# Patient Record
Sex: MALE | Race: WHITE | NOT HISPANIC OR LATINO | URBAN - METROPOLITAN AREA
[De-identification: names, ages, dates, MRNs, and addresses within clinical notes are randomized per-mention and may not be internally consistent; named-entity substitution may affect disease eponyms.]

---

## 2017-05-10 ENCOUNTER — OUTPATIENT (OUTPATIENT)
Dept: OUTPATIENT SERVICES | Facility: HOSPITAL | Age: 82
LOS: 1 days | Discharge: HOME | End: 2017-05-10

## 2017-06-28 DIAGNOSIS — R35.0 FREQUENCY OF MICTURITION: ICD-10-CM

## 2017-06-28 DIAGNOSIS — R97.20 ELEVATED PROSTATE SPECIFIC ANTIGEN [PSA]: ICD-10-CM

## 2017-09-12 ENCOUNTER — OUTPATIENT (OUTPATIENT)
Dept: OUTPATIENT SERVICES | Facility: HOSPITAL | Age: 82
LOS: 1 days | Discharge: HOME | End: 2017-09-12

## 2017-09-12 DIAGNOSIS — R05 COUGH: ICD-10-CM

## 2018-03-20 NOTE — ASU PATIENT PROFILE, ADULT - ABILITY TO HEAR (WITH HEARING AID OR HEARING APPLIANCE IF NORMALLY USED):
wears left ear hearing aide/Mildly to Moderately Impaired: difficulty hearing in some environments or speaker may need to increase volume or speak distinctly

## 2018-03-21 ENCOUNTER — OUTPATIENT (OUTPATIENT)
Dept: OUTPATIENT SERVICES | Facility: HOSPITAL | Age: 83
LOS: 1 days | Discharge: HOME | End: 2018-03-21

## 2018-03-21 VITALS
TEMPERATURE: 97 F | HEIGHT: 63 IN | HEART RATE: 80 BPM | RESPIRATION RATE: 18 BRPM | WEIGHT: 132.06 LBS | OXYGEN SATURATION: 98 % | SYSTOLIC BLOOD PRESSURE: 174 MMHG | DIASTOLIC BLOOD PRESSURE: 98 MMHG

## 2018-03-21 VITALS — DIASTOLIC BLOOD PRESSURE: 88 MMHG | HEART RATE: 92 BPM | SYSTOLIC BLOOD PRESSURE: 181 MMHG | RESPIRATION RATE: 15 BRPM

## 2018-03-21 DIAGNOSIS — H25.89 OTHER AGE-RELATED CATARACT: ICD-10-CM

## 2018-06-06 ENCOUNTER — OUTPATIENT (OUTPATIENT)
Dept: OUTPATIENT SERVICES | Facility: HOSPITAL | Age: 83
LOS: 1 days | Discharge: HOME | End: 2018-06-06

## 2018-06-06 VITALS
WEIGHT: 132.06 LBS | RESPIRATION RATE: 15 BRPM | DIASTOLIC BLOOD PRESSURE: 100 MMHG | SYSTOLIC BLOOD PRESSURE: 212 MMHG | TEMPERATURE: 96 F | HEART RATE: 92 BPM | OXYGEN SATURATION: 97 % | HEIGHT: 64 IN

## 2018-06-06 VITALS
DIASTOLIC BLOOD PRESSURE: 100 MMHG | HEIGHT: 64 IN | SYSTOLIC BLOOD PRESSURE: 212 MMHG | TEMPERATURE: 96 F | OXYGEN SATURATION: 97 % | RESPIRATION RATE: 15 BRPM | WEIGHT: 132.06 LBS | HEART RATE: 92 BPM

## 2018-06-06 DIAGNOSIS — Z96.1 PRESENCE OF INTRAOCULAR LENS: Chronic | ICD-10-CM

## 2018-06-06 RX ORDER — SODIUM CHLORIDE 9 MG/ML
1000 INJECTION, SOLUTION INTRAVENOUS
Qty: 0 | Refills: 0 | Status: DISCONTINUED | OUTPATIENT
Start: 2018-06-06 | End: 2018-06-21

## 2018-06-06 RX ORDER — ONDANSETRON 8 MG/1
4 TABLET, FILM COATED ORAL ONCE
Qty: 0 | Refills: 0 | Status: DISCONTINUED | OUTPATIENT
Start: 2018-06-06 | End: 2018-06-21

## 2018-06-13 DIAGNOSIS — J44.9 CHRONIC OBSTRUCTIVE PULMONARY DISEASE, UNSPECIFIED: ICD-10-CM

## 2018-06-13 DIAGNOSIS — F17.210 NICOTINE DEPENDENCE, CIGARETTES, UNCOMPLICATED: ICD-10-CM

## 2018-06-13 DIAGNOSIS — H26.9 UNSPECIFIED CATARACT: ICD-10-CM

## 2019-03-31 ENCOUNTER — INPATIENT (INPATIENT)
Facility: HOSPITAL | Age: 84
LOS: 3 days | Discharge: HOME | End: 2019-04-04
Attending: INTERNAL MEDICINE | Admitting: INTERNAL MEDICINE
Payer: MEDICARE

## 2019-03-31 VITALS
OXYGEN SATURATION: 95 % | DIASTOLIC BLOOD PRESSURE: 127 MMHG | RESPIRATION RATE: 16 BRPM | SYSTOLIC BLOOD PRESSURE: 258 MMHG | HEART RATE: 98 BPM

## 2019-03-31 DIAGNOSIS — Z96.1 PRESENCE OF INTRAOCULAR LENS: Chronic | ICD-10-CM

## 2019-03-31 LAB
ALBUMIN SERPL ELPH-MCNC: 4.6 G/DL — SIGNIFICANT CHANGE UP (ref 3.5–5.2)
ALP SERPL-CCNC: 102 U/L — SIGNIFICANT CHANGE UP (ref 30–115)
ALT FLD-CCNC: 11 U/L — SIGNIFICANT CHANGE UP (ref 0–41)
ANION GAP SERPL CALC-SCNC: 12 MMOL/L — SIGNIFICANT CHANGE UP (ref 7–14)
APTT BLD: 25.6 SEC — LOW (ref 27–39.2)
AST SERPL-CCNC: 12 U/L — SIGNIFICANT CHANGE UP (ref 0–41)
BASOPHILS # BLD AUTO: 0.03 K/UL — SIGNIFICANT CHANGE UP (ref 0–0.2)
BASOPHILS NFR BLD AUTO: 0.5 % — SIGNIFICANT CHANGE UP (ref 0–1)
BILIRUB SERPL-MCNC: 0.2 MG/DL — SIGNIFICANT CHANGE UP (ref 0.2–1.2)
BUN SERPL-MCNC: 35 MG/DL — HIGH (ref 10–20)
CALCIUM SERPL-MCNC: 8.8 MG/DL — SIGNIFICANT CHANGE UP (ref 8.5–10.1)
CHLORIDE SERPL-SCNC: 107 MMOL/L — SIGNIFICANT CHANGE UP (ref 98–110)
CO2 SERPL-SCNC: 25 MMOL/L — SIGNIFICANT CHANGE UP (ref 17–32)
CREAT SERPL-MCNC: 2.7 MG/DL — HIGH (ref 0.7–1.5)
EOSINOPHIL # BLD AUTO: 0.07 K/UL — SIGNIFICANT CHANGE UP (ref 0–0.7)
EOSINOPHIL NFR BLD AUTO: 1.1 % — SIGNIFICANT CHANGE UP (ref 0–8)
GLUCOSE SERPL-MCNC: 114 MG/DL — HIGH (ref 70–99)
HCT VFR BLD CALC: 43.1 % — SIGNIFICANT CHANGE UP (ref 42–52)
HGB BLD-MCNC: 13.7 G/DL — LOW (ref 14–18)
IMM GRANULOCYTES NFR BLD AUTO: 0.3 % — SIGNIFICANT CHANGE UP (ref 0.1–0.3)
INR BLD: 1.02 RATIO — SIGNIFICANT CHANGE UP (ref 0.65–1.3)
LYMPHOCYTES # BLD AUTO: 1.21 K/UL — SIGNIFICANT CHANGE UP (ref 1.2–3.4)
LYMPHOCYTES # BLD AUTO: 19.7 % — LOW (ref 20.5–51.1)
MCHC RBC-ENTMCNC: 31.1 PG — HIGH (ref 27–31)
MCHC RBC-ENTMCNC: 31.8 G/DL — LOW (ref 32–37)
MCV RBC AUTO: 97.7 FL — HIGH (ref 80–94)
MONOCYTES # BLD AUTO: 0.68 K/UL — HIGH (ref 0.1–0.6)
MONOCYTES NFR BLD AUTO: 11.1 % — HIGH (ref 1.7–9.3)
NEUTROPHILS # BLD AUTO: 4.13 K/UL — SIGNIFICANT CHANGE UP (ref 1.4–6.5)
NEUTROPHILS NFR BLD AUTO: 67.3 % — SIGNIFICANT CHANGE UP (ref 42.2–75.2)
NRBC # BLD: 0 /100 WBCS — SIGNIFICANT CHANGE UP (ref 0–0)
PLATELET # BLD AUTO: 190 K/UL — SIGNIFICANT CHANGE UP (ref 130–400)
POTASSIUM SERPL-MCNC: 4.3 MMOL/L — SIGNIFICANT CHANGE UP (ref 3.5–5)
POTASSIUM SERPL-SCNC: 4.3 MMOL/L — SIGNIFICANT CHANGE UP (ref 3.5–5)
PROT SERPL-MCNC: 7.6 G/DL — SIGNIFICANT CHANGE UP (ref 6–8)
PROTHROM AB SERPL-ACNC: 11.7 SEC — SIGNIFICANT CHANGE UP (ref 9.95–12.87)
RBC # BLD: 4.41 M/UL — LOW (ref 4.7–6.1)
RBC # FLD: 13.3 % — SIGNIFICANT CHANGE UP (ref 11.5–14.5)
SODIUM SERPL-SCNC: 144 MMOL/L — SIGNIFICANT CHANGE UP (ref 135–146)
TROPONIN T SERPL-MCNC: <0.01 NG/ML — SIGNIFICANT CHANGE UP
WBC # BLD: 6.14 K/UL — SIGNIFICANT CHANGE UP (ref 4.8–10.8)
WBC # FLD AUTO: 6.14 K/UL — SIGNIFICANT CHANGE UP (ref 4.8–10.8)

## 2019-03-31 RX ORDER — LABETALOL HCL 100 MG
20 TABLET ORAL ONCE
Qty: 0 | Refills: 0 | Status: COMPLETED | OUTPATIENT
Start: 2019-03-31 | End: 2019-03-31

## 2019-03-31 RX ORDER — LABETALOL HCL 100 MG
10 TABLET ORAL ONCE
Qty: 0 | Refills: 0 | Status: COMPLETED | OUTPATIENT
Start: 2019-03-31 | End: 2019-03-31

## 2019-03-31 RX ADMIN — Medication 20 MILLIGRAM(S): at 22:49

## 2019-03-31 RX ADMIN — Medication 10 MILLIGRAM(S): at 23:50

## 2019-03-31 NOTE — ED PROVIDER NOTE - OBJECTIVE STATEMENT
84 y/o M with PMH COPD not on home O2, current smoker, HLD, presents for constant persistent RUE numbness/weakness x "3-4 or maybe 4-5"hrs which began while sitting down watching TV. unable to specify further time period. no blood thinner use. denies hx prior. no other neurological symptoms. no speech deficit. completed a 20 hr drive earlier today. no HA/vision changes/CP/SOB/N/V/diaphoresis/trauma.      PMD Demaso

## 2019-03-31 NOTE — CHART NOTE - NSCHARTNOTEFT_GEN_A_CORE
Was called for stroke code around 9:56pm.  Patient recently drove up from [place] and was home alone.  He says that 3-4 hours ago felt as if his right hand was asleep.  If ED has numbness and weakness of his right hand with pronator drift.  I spoke with the patient on the phone to try to clarify the time.  He says he was trying to lay down watching tv to take a nap.  He then remembers that his right hand was numb.  He says that he thought he was sleeping on his hand and tried to shake it awake.  I tried to clarify what he meant by he thought he slept on his hand for example did he fall asleep and then wake up but patient was not giving a clear answer and says he was trying to fall asleep but doesn't think he fell asleep.  He cannot give a specific time and is not sure about the 3-4 hours but knows it was still light out.  his friends corrina also questioned by house staff and were unable to provide any additional information about timing.  His BP is >250 SBP and likely has hypertensive emergency.    Plan  1. Admit to ICU/Stroke  2. Neurochecks Q1 hour and vital q1 hour until SBP ~200's  3. If symptoms worsen then repeat CTH STAT and obtain CTA H+N  4. As timing cannot be clarified to see if he qualifies for the extended TPA window cannot offer him TPA  5. If no hemorrhage on CTH then can give 325mg aspirin x1 then 81mg QD as well as atorvastatin 80m QD  6. Will need MRI brain w/o NILAM tomorrow if no contraindications  7. ECHO  8. Call back STAT for any changes in exam

## 2019-03-31 NOTE — ED PROVIDER NOTE - NS ED ROS FT
Review of Systems    Constitutional: (-) fever   Eyes/ENT: (-) vision changes  Cardiovascular: (-) chest pain, (-) syncope (-) palpitations  Respiratory: (-) cough, (-) shortness of breath  Gastrointestinal: (-) vomiting, (-) diarrhea s (-) abdominal pain  Genitourinary:  (-) dysuria   Musculoskeletal: (-) neck pain, (-) back pain, (-) leg pain/swelling  Integumentary: (-) rash, (-) edema  Neurological: (-) headache, (-) altered mental status (-) confusion  Hematologic: (-) easy bruising   Psychiatric: (-) hallucinations  Allergic/Immunologic: (-) pruritus

## 2019-03-31 NOTE — ED PROVIDER NOTE - PHYSICAL EXAMINATION
PHYSICAL EXAM:    GENERAL: Alert, appears stated age, well appearing, non-toxic  SKIN: Warm, pink and dry.  EYE: Normal lids/conjunctiva, PERRL, EOMI  ENT: Normal hearing, patent oropharynx   NECK: +supple. No meningismus, or JVD  Pulm: Bilateral BS, normal resp effort, no wheezes, stridor, or retractions  CV: RRR, no M/R/G, 2+and = radial pulses  Abd: soft, non-tender, non-distended  Mskel: no erythema, cyanosis, edema. no calf tenderness  Neuro: AAOx3, no sensory deficits, CN 2-12 intact. No speech slurring, facial asymmetry. +R pronator drift. 3/5 strength in RUE, otherwise 5/5 strength throughout. normal gait.

## 2019-03-31 NOTE — ED PROVIDER NOTE - CLINICAL SUMMARY MEDICAL DECISION MAKING FREE TEXT BOX
RUE weakness, STROKE code called.  Timing cannot be confirmed with patient. Case d/w Dr Medel of Neuro who spoke with patient on telephone.  not tpa candidate.  Labetalol needed for BP cpntrol, case d/w Intensivist, will admit to CCU

## 2019-03-31 NOTE — ED PROVIDER NOTE - CARE PLAN
Principal Discharge DX:	Upper extremity weakness  Secondary Diagnosis:	Hypertensive emergency  Secondary Diagnosis:	DMITRIY (acute kidney injury)

## 2019-03-31 NOTE — ED PROVIDER NOTE - PROGRESS NOTE DETAILS
cristóbal recommedns ICU for neuro checks and vital checks disucssed with Jamia phillips-- will come eval pt approved for ICU tele. pts PMD is demaso -- discssed with Dr. Craig

## 2019-04-01 PROBLEM — H91.90 UNSPECIFIED HEARING LOSS, UNSPECIFIED EAR: Chronic | Status: ACTIVE | Noted: 2018-03-21

## 2019-04-01 PROBLEM — J44.9 CHRONIC OBSTRUCTIVE PULMONARY DISEASE, UNSPECIFIED: Chronic | Status: ACTIVE | Noted: 2018-03-21

## 2019-04-01 PROBLEM — Z86.79 PERSONAL HISTORY OF OTHER DISEASES OF THE CIRCULATORY SYSTEM: Chronic | Status: ACTIVE | Noted: 2018-03-21

## 2019-04-01 LAB
ALBUMIN SERPL ELPH-MCNC: 4.2 G/DL — SIGNIFICANT CHANGE UP (ref 3.5–5.2)
ALP SERPL-CCNC: 93 U/L — SIGNIFICANT CHANGE UP (ref 30–115)
ALT FLD-CCNC: 9 U/L — SIGNIFICANT CHANGE UP (ref 0–41)
ANION GAP SERPL CALC-SCNC: 13 MMOL/L — SIGNIFICANT CHANGE UP (ref 7–14)
APPEARANCE UR: CLEAR — SIGNIFICANT CHANGE UP
AST SERPL-CCNC: 12 U/L — SIGNIFICANT CHANGE UP (ref 0–41)
BACTERIA # UR AUTO: ABNORMAL
BILIRUB DIRECT SERPL-MCNC: <0.2 MG/DL — SIGNIFICANT CHANGE UP (ref 0–0.2)
BILIRUB INDIRECT FLD-MCNC: >0 MG/DL — LOW (ref 0.2–1.2)
BILIRUB SERPL-MCNC: 0.2 MG/DL — SIGNIFICANT CHANGE UP (ref 0.2–1.2)
BILIRUB UR-MCNC: NEGATIVE — SIGNIFICANT CHANGE UP
BUN SERPL-MCNC: 34 MG/DL — HIGH (ref 10–20)
CALCIUM SERPL-MCNC: 8.4 MG/DL — LOW (ref 8.5–10.1)
CHLORIDE SERPL-SCNC: 109 MMOL/L — SIGNIFICANT CHANGE UP (ref 98–110)
CHOLEST SERPL-MCNC: 160 MG/DL — SIGNIFICANT CHANGE UP (ref 100–200)
CK MB CFR SERPL CALC: 7.6 NG/ML — HIGH (ref 0.6–6.3)
CO2 SERPL-SCNC: 21 MMOL/L — SIGNIFICANT CHANGE UP (ref 17–32)
COLOR SPEC: YELLOW — SIGNIFICANT CHANGE UP
CREAT ?TM UR-MCNC: 61 MG/DL — SIGNIFICANT CHANGE UP
CREAT ?TM UR-MCNC: 62 MG/DL — SIGNIFICANT CHANGE UP
CREAT SERPL-MCNC: 2.5 MG/DL — HIGH (ref 0.7–1.5)
DIFF PNL FLD: ABNORMAL
EPI CELLS # UR: ABNORMAL /HPF
ESTIMATED AVERAGE GLUCOSE: 108 MG/DL — SIGNIFICANT CHANGE UP (ref 68–114)
GLUCOSE SERPL-MCNC: 103 MG/DL — HIGH (ref 70–99)
GLUCOSE UR QL: NEGATIVE MG/DL — SIGNIFICANT CHANGE UP
HBA1C BLD-MCNC: 5.4 % — SIGNIFICANT CHANGE UP (ref 4–5.6)
HDLC SERPL-MCNC: 62 MG/DL — SIGNIFICANT CHANGE UP
KETONES UR-MCNC: NEGATIVE — SIGNIFICANT CHANGE UP
LEUKOCYTE ESTERASE UR-ACNC: NEGATIVE — SIGNIFICANT CHANGE UP
LIPID PNL WITH DIRECT LDL SERPL: 91 MG/DL — SIGNIFICANT CHANGE UP (ref 4–129)
NITRITE UR-MCNC: NEGATIVE — SIGNIFICANT CHANGE UP
OSMOLALITY UR: 464 MOS/KG — SIGNIFICANT CHANGE UP (ref 50–1400)
PH UR: 5.5 — SIGNIFICANT CHANGE UP (ref 5–8)
POTASSIUM SERPL-MCNC: 4.4 MMOL/L — SIGNIFICANT CHANGE UP (ref 3.5–5)
POTASSIUM SERPL-SCNC: 4.4 MMOL/L — SIGNIFICANT CHANGE UP (ref 3.5–5)
PROT ?TM UR-MCNC: 22 MG/DLG/24H — SIGNIFICANT CHANGE UP
PROT SERPL-MCNC: 6.8 G/DL — SIGNIFICANT CHANGE UP (ref 6–8)
PROT UR-MCNC: 30 MG/DL
PROT/CREAT UR-RTO: 0.4 RATIO — HIGH (ref 0–0.2)
RBC CASTS # UR COMP ASSIST: SIGNIFICANT CHANGE UP /HPF
SODIUM SERPL-SCNC: 143 MMOL/L — SIGNIFICANT CHANGE UP (ref 135–146)
SODIUM UR-SCNC: 134 MMOL/L — SIGNIFICANT CHANGE UP
SP GR SPEC: 1.02 — SIGNIFICANT CHANGE UP (ref 1.01–1.03)
TOTAL CHOLESTEROL/HDL RATIO MEASUREMENT: 2.6 RATIO — LOW (ref 4–5.5)
TRIGL SERPL-MCNC: 115 MG/DL — SIGNIFICANT CHANGE UP (ref 10–149)
TROPONIN T SERPL-MCNC: <0.01 NG/ML — SIGNIFICANT CHANGE UP
UROBILINOGEN FLD QL: 0.2 MG/DL — SIGNIFICANT CHANGE UP (ref 0.2–0.2)

## 2019-04-01 PROCEDURE — 93970 EXTREMITY STUDY: CPT | Mod: 26

## 2019-04-01 PROCEDURE — 93979 VASCULAR STUDY: CPT | Mod: 26

## 2019-04-01 PROCEDURE — 93880 EXTRACRANIAL BILAT STUDY: CPT | Mod: 26

## 2019-04-01 PROCEDURE — 71250 CT THORAX DX C-: CPT | Mod: 26

## 2019-04-01 PROCEDURE — 76775 US EXAM ABDO BACK WALL LIM: CPT | Mod: 26

## 2019-04-01 RX ORDER — LABETALOL HCL 100 MG
20 TABLET ORAL ONCE
Qty: 0 | Refills: 0 | Status: DISCONTINUED | OUTPATIENT
Start: 2019-04-01 | End: 2019-04-01

## 2019-04-01 RX ORDER — ATORVASTATIN CALCIUM 80 MG/1
80 TABLET, FILM COATED ORAL AT BEDTIME
Qty: 0 | Refills: 0 | Status: DISCONTINUED | OUTPATIENT
Start: 2019-04-01 | End: 2019-04-04

## 2019-04-01 RX ORDER — DOXAZOSIN MESYLATE 4 MG
2 TABLET ORAL AT BEDTIME
Qty: 0 | Refills: 0 | Status: DISCONTINUED | OUTPATIENT
Start: 2019-04-01 | End: 2019-04-04

## 2019-04-01 RX ORDER — NICOTINE POLACRILEX 2 MG
1 GUM BUCCAL DAILY
Qty: 0 | Refills: 0 | Status: DISCONTINUED | OUTPATIENT
Start: 2019-04-01 | End: 2019-04-04

## 2019-04-01 RX ORDER — LABETALOL HCL 100 MG
10 TABLET ORAL ONCE
Qty: 0 | Refills: 0 | Status: COMPLETED | OUTPATIENT
Start: 2019-04-01 | End: 2019-04-01

## 2019-04-01 RX ORDER — ATORVASTATIN CALCIUM 80 MG/1
40 TABLET, FILM COATED ORAL AT BEDTIME
Qty: 0 | Refills: 0 | Status: DISCONTINUED | OUTPATIENT
Start: 2019-04-01 | End: 2019-04-01

## 2019-04-01 RX ORDER — CLOPIDOGREL BISULFATE 75 MG/1
75 TABLET, FILM COATED ORAL DAILY
Qty: 0 | Refills: 0 | Status: DISCONTINUED | OUTPATIENT
Start: 2019-04-01 | End: 2019-04-01

## 2019-04-01 RX ORDER — NIFEDIPINE 30 MG
30 TABLET, EXTENDED RELEASE 24 HR ORAL DAILY
Qty: 0 | Refills: 0 | Status: DISCONTINUED | OUTPATIENT
Start: 2019-04-01 | End: 2019-04-04

## 2019-04-01 RX ORDER — HEPARIN SODIUM 5000 [USP'U]/ML
5000 INJECTION INTRAVENOUS; SUBCUTANEOUS EVERY 8 HOURS
Qty: 0 | Refills: 0 | Status: DISCONTINUED | OUTPATIENT
Start: 2019-04-01 | End: 2019-04-04

## 2019-04-01 RX ORDER — IPRATROPIUM/ALBUTEROL SULFATE 18-103MCG
3 AEROSOL WITH ADAPTER (GRAM) INHALATION EVERY 6 HOURS
Qty: 0 | Refills: 0 | Status: DISCONTINUED | OUTPATIENT
Start: 2019-04-01 | End: 2019-04-04

## 2019-04-01 RX ORDER — ASPIRIN/CALCIUM CARB/MAGNESIUM 324 MG
325 TABLET ORAL DAILY
Qty: 0 | Refills: 0 | Status: DISCONTINUED | OUTPATIENT
Start: 2019-04-01 | End: 2019-04-04

## 2019-04-01 RX ORDER — NICARDIPINE HYDROCHLORIDE 30 MG/1
2.5 CAPSULE, EXTENDED RELEASE ORAL
Qty: 40 | Refills: 0 | Status: DISCONTINUED | OUTPATIENT
Start: 2019-04-01 | End: 2019-04-01

## 2019-04-01 RX ORDER — PANTOPRAZOLE SODIUM 20 MG/1
40 TABLET, DELAYED RELEASE ORAL
Qty: 0 | Refills: 0 | Status: DISCONTINUED | OUTPATIENT
Start: 2019-04-01 | End: 2019-04-04

## 2019-04-01 RX ADMIN — Medication 40 MILLIGRAM(S): at 09:09

## 2019-04-01 RX ADMIN — Medication 325 MILLIGRAM(S): at 12:48

## 2019-04-01 RX ADMIN — Medication 10 MILLIGRAM(S): at 00:30

## 2019-04-01 RX ADMIN — HEPARIN SODIUM 5000 UNIT(S): 5000 INJECTION INTRAVENOUS; SUBCUTANEOUS at 21:40

## 2019-04-01 RX ADMIN — Medication 2 MILLIGRAM(S): at 21:39

## 2019-04-01 RX ADMIN — Medication 1 PATCH: at 20:07

## 2019-04-01 RX ADMIN — ATORVASTATIN CALCIUM 80 MILLIGRAM(S): 80 TABLET, FILM COATED ORAL at 21:39

## 2019-04-01 RX ADMIN — Medication 30 MILLIGRAM(S): at 09:32

## 2019-04-01 RX ADMIN — Medication 1 PATCH: at 11:16

## 2019-04-01 RX ADMIN — NICARDIPINE HYDROCHLORIDE 12.5 MG/HR: 30 CAPSULE, EXTENDED RELEASE ORAL at 08:52

## 2019-04-01 RX ADMIN — HEPARIN SODIUM 5000 UNIT(S): 5000 INJECTION INTRAVENOUS; SUBCUTANEOUS at 05:38

## 2019-04-01 RX ADMIN — Medication 3 MILLILITER(S): at 21:48

## 2019-04-01 RX ADMIN — Medication 600 MILLIGRAM(S): at 18:19

## 2019-04-01 RX ADMIN — NICARDIPINE HYDROCHLORIDE 12.5 MG/HR: 30 CAPSULE, EXTENDED RELEASE ORAL at 04:10

## 2019-04-01 RX ADMIN — HEPARIN SODIUM 5000 UNIT(S): 5000 INJECTION INTRAVENOUS; SUBCUTANEOUS at 15:14

## 2019-04-01 RX ADMIN — PANTOPRAZOLE SODIUM 40 MILLIGRAM(S): 20 TABLET, DELAYED RELEASE ORAL at 06:02

## 2019-04-01 NOTE — SWALLOW BEDSIDE ASSESSMENT ADULT - ASR SWALLOW ASPIRATION MONITOR
throat clearing/upper respiratory infection/position upright (90Y)/cough/fever/oral hygiene/pneumonia/change of breathing pattern/gurgly voice

## 2019-04-01 NOTE — SWALLOW BEDSIDE ASSESSMENT ADULT - COMMENTS
Spoke with RN (Yani). Pt has a baseline cough. Noted coughing after drinking liquids today. Tolerated meals and medications without noted difficulty.

## 2019-04-01 NOTE — SWALLOW BEDSIDE ASSESSMENT ADULT - ORAL PHASE
Within functional limits Slow but functional bolus manipulation/propulsion. likely contributed to by limited dentition./Delayed oral transit time

## 2019-04-01 NOTE — H&P ADULT - HISTORY OF PRESENT ILLNESS
84 y/o M with PMH COPD not on home O2, current smoker, HLD, presents for constant persistent RUE numbness/weakness x "3-4 or maybe 4-5"hrs which began while sitting down watching TV. unable to specify further time period. No blood thinner use. Denies any hx of strokes in the past. He completed a 20 hour drive from [place] the day ptp. NIHSS 1. Pt was also hypertensive on arrival to 258/127 HR 98. Pt was started on cardene drip, which he remains on this am. CT head neg. Pt was also found to have Cr 2.7, it was 2.3 in 4/2017, today it is downtrending to 2.5.

## 2019-04-01 NOTE — H&P ADULT - NSHPLABSRESULTS_GEN_ALL_CORE
CARDIAC MARKERS ( 01 Apr 2019 05:24 )  x     / <0.01 ng/mL / x     / x     / 7.6 ng/mL  CARDIAC MARKERS ( 31 Mar 2019 22:20 )  x     / <0.01 ng/mL / x     / x     / x                                  13.7   6.14  )-----------( 190      ( 31 Mar 2019 22:20 )             43.1       04-01    143  |  109  |  34<H>  ----------------------------<  103<H>  4.4   |  21  |  2.5<H>    Ca    8.4<L>      01 Apr 2019 05:24    TPro  6.8  /  Alb  4.2  /  TBili  0.2  /  DBili  <0.2  /  AST  12  /  ALT  9   /  AlkPhos  93  04-01      CAPILLARY BLOOD GLUCOSE      POCT Blood Glucose.: 113 mg/dL (31 Mar 2019 21:55)      LIVER FUNCTIONS - ( 01 Apr 2019 05:24 )  Alb: 4.2 g/dL / Pro: 6.8 g/dL / ALK PHOS: 93 U/L / ALT: 9 U/L / AST: 12 U/L / GGT: x             PT/INR - ( 31 Mar 2019 22:20 )   PT: 11.70 sec;   INR: 1.02 ratio         PTT - ( 31 Mar 2019 22:20 )  PTT:25.6 sec    CT Head: neg                  I&O's Summary    31 Mar 2019 07:01  -  01 Apr 2019 07:00  --------------------------------------------------------  IN: 25 mL / OUT: 660 mL / NET: -635 mL    01 Apr 2019 07:01  -  01 Apr 2019 08:20  --------------------------------------------------------  IN: 0 mL / OUT: 200 mL / NET: -200 mL

## 2019-04-01 NOTE — OCCUPATIONAL THERAPY INITIAL EVALUATION ADULT - GENERAL OBSERVATIONS, REHAB EVAL
12:55-13:20 12:55-13:20 Chart reviewed. Patient ok to be seen as confirmed by RN patient received in bedside chair +tele in NAD agreeable for treatment

## 2019-04-01 NOTE — CONSULT NOTE ADULT - SUBJECTIVE AND OBJECTIVE BOX
HPI:  84 y/o M with PMH COPD not on home O2, current smoker, HLD, presents for constant persistent RUE numbness/weakness x "3-4 or maybe 4-5"hrs which began while sitting down watching TV. unable to specify further time period. No blood thinner use. Denies any hx of strokes in the past. He completed a 20 hour drive from [place] the day ptp. NIHSS 1. Pt was also hypertensive on arrival to 258/127 HR 98. Pt was started on cardene drip, which he remains on this am. CT head neg. Pt was also found to have Cr 2.7, it was 2.3 in 4/2017, today it is downtrending to 2.5.     PTN  REFERRED TO ACUTE  REHAB  FOR  EVAL AND  TX   PAST MEDICAL & SURGICAL HISTORY:  [place] ([place] of hearing)  H/O: HTN (hypertension)  Advanced COPD  History of intraocular lens implant: LEFT EYE      Hospital Course:    TODAY'S SUBJECTIVE & REVIEW OF SYMPTOMS:     Constitutional WNL   Cardio WNL   Resp WNL   GI WNL  Heme WNL  Endo WNL  Skin WNL  MSK WNL  Neuro WNL  Cognitive WNL  Psych WNL      MEDICATIONS  (STANDING):  atorvastatin 40 milliGRAM(s) Oral at bedtime  clopidogrel Tablet 75 milliGRAM(s) Oral daily  heparin  Injectable 5000 Unit(s) SubCutaneous every 8 hours  niCARdipine Infusion 2.5 mG/Hr (12.5 mL/Hr) IV Continuous <Continuous>  nicotine - 21 mG/24Hr(s) Patch 1 patch Transdermal daily  NIFEdipine XL 30 milliGRAM(s) Oral daily  pantoprazole    Tablet 40 milliGRAM(s) Oral before breakfast  predniSONE   Tablet 40 milliGRAM(s) Oral daily    MEDICATIONS  (PRN):  ALBUTerol/ipratropium for Nebulization 3 milliLiter(s) Nebulizer every 6 hours PRN Shortness of Breath and/or Wheezing      FAMILY HISTORY:      Allergies    No Known Allergies    Intolerances        SOCIAL HISTORY:    [  ] Etoh  [  ] Smoking  [  ] Substance abuse     Home Environment:  [ x ] Home Alone  [  ] Lives with Family  [  ] Home Health Aid    Dwelling:  [  ] Apartment  [ x ] Private House  [  ] Adult Home  [  ] Skilled Nursing Facility      [  ] Short Term  [  ] Long Term  [ x ] Stairs       Elevator [  ]    FUNCTIONAL STATUS PTA: (Check all that apply)  Ambulation: [   x]Independent    [  ] Dependent     [  ] Non-Ambulatory  Assistive Device: [ x] SA Cane  [  ]  Q Cane  [  ] Walker  [  ]  Wheelchair  ADL : [ x ] Independent  [  ]  Dependent       Vital Signs Last 24 Hrs  T(C): 35.9 (01 Apr 2019 07:10), Max: 35.9 (01 Apr 2019 07:10)  T(F): 96.6 (01 Apr 2019 07:10), Max: 96.6 (01 Apr 2019 07:10)  HR: 76 (01 Apr 2019 09:01) (68 - 98)  BP: 180/95 (01 Apr 2019 09:01) (140/72 - 258/127)  BP(mean): 130 (01 Apr 2019 09:01) (97 - 165)  RR: 20 (01 Apr 2019 09:00) (16 - 24)  SpO2: 95% (01 Apr 2019 09:01) (89% - 96%)      PHYSICAL EXAM: Alert & Oriented X3  GENERAL: NAD, well-groomed, well-developed  HEAD:  Atraumatic, Normocephalic  EYES: EOMI, PERRLA, conjunctiva and sclera clear  NECK: Supple, No JVD, Normal thyroid  CHEST/LUNG: Clear to percussion bilaterally; No rales, rhonchi, wheezing, or rubs  HEART: Regular rate and rhythm; No murmurs, rubs, or gallops  ABDOMEN: Soft, Nontender, Nondistended; Bowel sounds present  EXTREMITIES:  2+ Peripheral Pulses, No clubbing, cyanosis, or edema    NERVOUS SYSTEM:  Cranial Nerves 2-12 intact [ x ] Abnormal  [  ]  ROM: WFL all extremities [ x ]  Abnormal [  ]  Motor Strength: WFL all extremities  [ x ]  Abnormal [  ]  Sensation: intact to light touch [ x ] Abnormal [  ]  Reflexes: Symmetric [ x ]  Abnormal [  ]    FUNCTIONAL STATUS:  Bed Mobility: Independent [  ]  Supervision [x  ]  Needs Assistance [  ]  N/A [  ]  Transfers: Independent [  ]  Supervision [ x ]  Needs Assistance [  ]  N/A [  ]   Ambulation: Independent [  ]  Supervision [ x ]  Needs Assistance [  ]  N/A [  ]  ADL: Independent [  ] Requires Assistance [  ] N/A [ x ]  SEE PT/OT IE NOTES    LABS:                        13.7   6.14  )-----------( 190      ( 31 Mar 2019 22:20 )             43.1     04-01    143  |  109  |  34<H>  ----------------------------<  103<H>  4.4   |  21  |  2.5<H>    Ca    8.4<L>      01 Apr 2019 05:24    TPro  6.8  /  Alb  4.2  /  TBili  0.2  /  DBili  <0.2  /  AST  12  /  ALT  9   /  AlkPhos  93  04-01    PT/INR - ( 31 Mar 2019 22:20 )   PT: 11.70 sec;   INR: 1.02 ratio         PTT - ( 31 Mar 2019 22:20 )  PTT:25.6 sec      RADIOLOGY & ADDITIONAL STUDIES:  < from: CT Brain Stroke Protocol (03.31.19 @ 22:07) >  FINDINGS:    The ventricles and cortical sulci are appropriate for the patient's   stated age.    Gray-white matter differentiation is preserved.    There are scattered patchy hypodensities throughout the hemispheric white   matter which are nonspecific and without mass effect, compatible with   chronic microvascular ischemic changes.    There is no evidence for intracranial hemorrhage, significant   space-occupying process, or recent territorial infarction.    The calvarium is intact. Visualized paranasal sinuses and mastoids are   well-aerated.      IMPRESSION:    No CT evidence for acute intracranial pathology.         < end of copied text >    Assesment:

## 2019-04-01 NOTE — SWALLOW BEDSIDE ASSESSMENT ADULT - PHARYNGEAL PHASE
Multiple swallows/+ toleration observed without overt symptoms of penetration/aspiration. + toleration observed without overt symptoms of penetration/aspiration

## 2019-04-01 NOTE — CONSULT NOTE ADULT - SUBJECTIVE AND OBJECTIVE BOX
NEPHROLOGY CONSULTATION NOTE    82 y/o M with PMH COPD not on home O2, current smoker, HLD, presents for constant persistent RUE numbness/weakness x  4-hrs which began while sitting down watching TV. unable to specify further time period. No blood thinner use. Denies any hx of strokes in the past. He completed a 20 hour drive from [place] the day ptp. Pt was also hypertensive on arrival to 258/127 HR 98. Pt was started on cardene drip, which he remains on this am. CT head neg. Pt was also found to have Cr 2.7, it was 2.3 in 4/2017, today it is downtrending to 2.5.   Also noted to have microalbuminuaria in 2017. Does not follow up with a doctor, just started seeing Dr Carrillo. Not on BP meds at home, taking only a statin.  /o frequent urination at night, no hematuria or dysuria.    PAST MEDICAL & SURGICAL HISTORY:  [place] (hard of hearing)  H/O: HTN (hypertension)  Advanced COPD  History of intraocular lens implant: LEFT EYE    Allergies:  No Known Allergies    Home Medications Reviewed  Hospital Medications:   MEDICATIONS  (STANDING):  atorvastatin 40 milliGRAM(s) Oral at bedtime  clopidogrel Tablet 75 milliGRAM(s) Oral daily  heparin  Injectable 5000 Unit(s) SubCutaneous every 8 hours  niCARdipine Infusion 2.5 mG/Hr (12.5 mL/Hr) IV Continuous <Continuous>  nicotine - 21 mG/24Hr(s) Patch 1 patch Transdermal daily  NIFEdipine XL 30 milliGRAM(s) Oral daily  pantoprazole    Tablet 40 milliGRAM(s) Oral before breakfast  predniSONE   Tablet 40 milliGRAM(s) Oral daily      SOCIAL HISTORY:  Denies ETOH,Smoking,   FAMILY HISTORY:        REVIEW OF SYSTEMS:  CONSTITUTIONAL: No weakness, fevers or chills  EYES/ENT: No visual changes;  No vertigo or throat pain   NECK: No pain or stiffness  RESPIRATORY: No cough, wheezing, hemoptysis; No shortness of breath  CARDIOVASCULAR: No chest pain or palpitations.  GASTROINTESTINAL: No abdominal or epigastric pain. No nausea, vomiting, or hematemesis;   GENITOURINARY: No dysuria, foamy urine, or hematuria  SKIN: No itching, burning, rashes, or lesions   VASCULAR: No bilateral lower extremity edema.   All other review of systems is negative unless indicated above.    VITALS:  T(F): 96.6 (04-01-19 @ 07:10), Max: 96.6 (04-01-19 @ 07:10)  HR: 70 (04-01-19 @ 06:46)  BP: 145/79 (04-01-19 @ 06:46)  RR: 20 (04-01-19 @ 07:10)  SpO2: 92% (04-01-19 @ 06:46)    03-31 @ 07:01  -  04-01 @ 07:00  --------------------------------------------------------  IN: 25 mL / OUT: 660 mL / NET: -635 mL    04-01 @ 07:01  -  04-01 @ 09:01  --------------------------------------------------------  IN: 0 mL / OUT: 200 mL / NET: -200 mL      Height (cm): 162.56 (04-01 @ 00:26)      I&O's Detail    31 Mar 2019 07:01  -  01 Apr 2019 07:00  --------------------------------------------------------  IN:    niCARdipine Infusion: 25 mL  Total IN: 25 mL    OUT:    Voided: 660 mL  Total OUT: 660 mL    Total NET: -635 mL      01 Apr 2019 07:01  -  01 Apr 2019 09:01  --------------------------------------------------------  IN:  Total IN: 0 mL    OUT:    Voided: 200 mL  Total OUT: 200 mL    Total NET: -200 mL            PHYSICAL EXAM:  Constitutional: NAD  HEENT: anicteric sclera, MMM  Neck: No JVD  Respiratory: CTAB,   Cardiovascular: S1, S2, RRR  Gastrointestinal: BS+, soft, NT/ND  Extremities: o peripheral edema  Neurological: A/O x 3,  Psychiatric: Normal mood, normal affect  : No CVA tenderness. No hernández.   Skin: No rashes  Vascular Access:    LABS:  04-01    143  |  109  |  34<H>  ----------------------------<  103<H>  4.4   |  21  |  2.5<H>    Ca    8.4<L>      01 Apr 2019 05:24    TPro  6.8  /  Alb  4.2  /  TBili  0.2  /  DBili  <0.2  /  AST  12  /  ALT  9   /  AlkPhos  93  04-01    Creatinine Trend: 2.5 <--, 2.7 <--                        13.7   6.14  )-----------( 190      ( 31 Mar 2019 22:20 )             43.1     Urine Studies:              RADIOLOGY & ADDITIONAL STUDIES:    < from: CT Brain Stroke Protocol (03.31.19 @ 22:07) >  IMPRESSION:    No CT evidence for acute intracranial pathology.     < end of copied text >

## 2019-04-01 NOTE — PHYSICAL THERAPY INITIAL EVALUATION ADULT - GENERAL OBSERVATIONS, REHAB EVAL
11:05-11:25 Chart reviewed. Pt encountered sitting in chair, may be seen by Physical Therapist as confirmed with Nurse. Patient denied pain at rest and would like to walk but "(R) hand feels numb"; +tele

## 2019-04-01 NOTE — H&P ADULT - NSHPPHYSICALEXAM_GEN_ALL_CORE
General: elderly male, reclining in bed  Cardiac: RRR, S1S3  Lungs: CTAB  Abd: NTND, +BS  LE: no swelling  Neuro: AAOx3, nonfocal, still endorses subjective coming and going weakness of the RUE, but there is no objective weakness appreciated, sensation intact  no other focal deficits appreciated  +hearing deficit General: elderly male, reclining in bed  Cardiac: RRR, S1S3  Lungs: ++hacking cough  Abd: NTND, +BS  LE: no swelling  Neuro: AAOx3, nonfocal, still endorses subjective coming and going weakness of the RUE, but there is no objective weakness appreciated, sensation intact  no other focal deficits appreciated  +hearing deficit General: elderly male, reclining in bed  Cardiac: RRR, S1S3  Lungs: ++hacking cough  Abd: NTND, +BS  LE: no swelling  Neuro: AAOx3, nonfocal, still endorses subjective coming and going weakness of the RUE, but there is no objective weakness appreciated, however pt does have dysmetria when he peforms tasks with that hand, sensation intact  no other focal deficits appreciated  +hearing deficit

## 2019-04-01 NOTE — SWALLOW BEDSIDE ASSESSMENT ADULT - SWALLOW EVAL: DIAGNOSIS
No evidence of oral or pharyngeal dysphagia during clinical bedside swallow evaluation. RN and pt report baseline cough, however not observed at the time of the evaluation or during/after PO intake. Possible GI involvement due to pt complaints of premature feeling of fullness and noted burping during intake.

## 2019-04-01 NOTE — SWALLOW BEDSIDE ASSESSMENT ADULT - ASR SWALLOW DENTITION
partial top and bottom. pt declined to put them in prior to PO intake. States he prefers not to wear them./partial dentures

## 2019-04-01 NOTE — H&P ADULT - ASSESSMENT
RUE Weakness likely 2/2 Musculoskeletal Issue considering recent 20 hour drive  -CT head neg, no weakness appreciated on physical exam  -will repeat scan if new neurologic signs develop  -on asa, plavix, statin    HTN  -on cardene gtt  -will start po meds and try to wean off drip    COPD, active smoker  -educated to stop smoking  -will start nicotine patch if needed    DMITRIY on CKD vs Progression of CKD, could be 2/2 long standing HTN  -trend Cr, followup urine studies  -control bp better  -made 600cc of urine since admission    DVT PPX: hep sq RUE Weakness likely 2/2 Musculoskeletal Issue considering recent 20 hour drive  -CT head neg, no weakness appreciated on physical exam  -will repeat scan if new neurologic signs develop  -on plavix, statin    HTN  -on cardene gtt  -will start po meds and try to wean off drip, start nifedepine er 30mg qd    COPD, active smoker  -educated to stop smoking  -will start nicotine patch if needed    DMITRIY on CKD vs Progression of CKD, could be 2/2 long standing HTN  -trend Cr, followup urine studies  -control bp better  -made 600cc of urine since admission    DVT PPX: hep sq RUE Weakness likely 2/2 Stroke  -CT head neg  -will repeat scan if new neurologic signs develop  -on asa 325 now (pt denies taking any asa from home), statin  -as per Dr Vega-will obtain MRI Brain noncon    HTN  -on cardene gtt  -will start po meds and try to wean off drip, start nifedepine er 30mg qd    COPD, active smoker  -educated to stop smoking  -will start nicotine patch if needed    DMITRIY on CKD vs Progression of CKD, could be 2/2 long standing HTN  -trend Cr, followup urine studies  -control bp better  -made 600cc of urine since admission    DVT PPX: hep sq RUE Weakness likely 2/2 Stroke  -CT head neg  -will repeat scan if new neurologic signs develop  -on asa 325 now (pt denies taking any asa from home), statin  -as per Dr Vega-will obtain MRI Brain noncon    HTN  -on cardene gtt  -will start po meds and try to wean off drip, start nifedepine er 30mg qd    COPD, active smoker  -educated to stop smoking  -will start nicotine patch if needed  -cxr shows R upper extremity opacity/scarring, radiology recommends nonurgent ct chest    DMITRIY on CKD vs Progression of CKD, could be 2/2 long standing HTN  -trend Cr, followup urine studies  -control bp better  -made 600cc of urine since admission    DVT PPX: hep sq RUE Weakness likely 2/2 Stroke  -CT head neg  -will repeat scan if new neurologic signs develop  -on asa 325 now (pt denies taking any asa from home), statin  -as per Dr Vega-will obtain MRI Brain noncon    HTN  -on cardene gtt  -will start po meds and try to wean off drip, start nifedepine er 30mg qd    COPD, active smoker  -educated to stop smoking  -will start nicotine patch if needed  -cxr shows R upper extremity opacity/scarring, radiology recommends nonurgent ct chest    DMITRIY on CKD vs Progression of CKD, could be 2/2 long standing HTN  -trend Cr, followup urine studies  -control bp better  -made 600cc of urine since admission    DVT PPX: hep sq    approved to be downgraded to hard tele as per fellow Dr Kirby

## 2019-04-01 NOTE — PROGRESS NOTE ADULT - SUBJECTIVE AND OBJECTIVE BOX
Patient is a 83y old  Male who presents with a chief complaint of R hand weakness (01 Apr 2019 08:10)        Over Night Events:        ROS:  See HPI    PHYSICAL EXAM    ICU Vital Signs Last 24 Hrs  T(C): 35.9 (01 Apr 2019 07:10), Max: 35.9 (01 Apr 2019 07:10)  T(F): 96.6 (01 Apr 2019 07:10), Max: 96.6 (01 Apr 2019 07:10)  HR: 70 (01 Apr 2019 06:46) (68 - 98)  BP: 145/79 (01 Apr 2019 06:46) (140/72 - 258/127)  BP(mean): 107 (01 Apr 2019 06:46) (97 - 165)  ABP: --  ABP(mean): --  RR: 20 (01 Apr 2019 07:10) (16 - 24)  SpO2: 92% (01 Apr 2019 06:46) (89% - 96%)      General: Coughing.   HEENT: LEATHA             Lymphatic system: No cervical LN   Lungs: Bilateral BS  Cardiovascular: Regular   Gastrointestinal: Soft, Positive BS  Extremities: No clubbing.  Moves extremities.  Full Range of motion   Skin: Warm, intact  Neurological: No motor or sensory deficit       03-31-19 @ 07:01  -  04-01-19 @ 07:00  --------------------------------------------------------  IN:    niCARdipine Infusion: 25 mL  Total IN: 25 mL    OUT:    Voided: 660 mL  Total OUT: 660 mL    Total NET: -635 mL      04-01-19 @ 07:01  -  04-01-19 @ 08:53  --------------------------------------------------------  IN:  Total IN: 0 mL    OUT:    Voided: 200 mL  Total OUT: 200 mL    Total NET: -200 mL          LABS:                            13.7   6.14  )-----------( 190      ( 31 Mar 2019 22:20 )             43.1                                               04-01    143  |  109  |  34<H>  ----------------------------<  103<H>  4.4   |  21  |  2.5<H>    Ca    8.4<L>      01 Apr 2019 05:24    TPro  6.8  /  Alb  4.2  /  TBili  0.2  /  DBili  <0.2  /  AST  12  /  ALT  9   /  AlkPhos  93  04-01      PT/INR - ( 31 Mar 2019 22:20 )   PT: 11.70 sec;   INR: 1.02 ratio         PTT - ( 31 Mar 2019 22:20 )  PTT:25.6 sec                                           CARDIAC MARKERS ( 01 Apr 2019 05:24 )  x     / <0.01 ng/mL / x     / x     / 7.6 ng/mL  CARDIAC MARKERS ( 31 Mar 2019 22:20 )  x     / <0.01 ng/mL / x     / x     / x                                                LIVER FUNCTIONS - ( 01 Apr 2019 05:24 )  Alb: 4.2 g/dL / Pro: 6.8 g/dL / ALK PHOS: 93 U/L / ALT: 9 U/L / AST: 12 U/L / GGT: x                                                                                                                                       MEDICATIONS  (STANDING):  atorvastatin 40 milliGRAM(s) Oral at bedtime  clopidogrel Tablet 75 milliGRAM(s) Oral daily  heparin  Injectable 5000 Unit(s) SubCutaneous every 8 hours  niCARdipine Infusion 2.5 mG/Hr (12.5 mL/Hr) IV Continuous <Continuous>  pantoprazole    Tablet 40 milliGRAM(s) Oral before breakfast    MEDICATIONS  (PRN):      Xrays:                                                                                     ECHO

## 2019-04-01 NOTE — PROGRESS NOTE ADULT - ASSESSMENT
Impression:  hyperintensive urgency  CKD? Secondary to uncontrolled HTN  Active smoker    IMPRESSION:      CNS: Follow up neurology recommendation. Continue with aspirn and lipitor.     HEENT: Oral care.    PULMONARY:  HOB @ 45 degrees. Prednisone 40 mg for 5 dyas. Out patient pulm follow up for PFT. Nicotine patch.     CARDIOVASCULAR: Blood pressure control. Start on oral hypertensive therapy.     GI: GI prophylaxis.  Feeding as tolerated    RENAL:  Follow up lytes.  Correct as needed. Send UA    INFECTIOUS DISEASE: Follow up cultures. No indication for antibiotics for now.     HEMATOLOGICAL:  DVT prophylaxis.    ENDOCRINE:  Follow up FS.  Insulin protocol if needed.    MUSCULOSKELETAL:  Physical therapy Impression:  hyperintensive urgency v. CVA  CKD? Secondary to uncontrolled HTN  Active smoker    IMPRESSION:      CNS: Follow up neurology recommendation. Continue with aspirin and Lipitor     HEENT: Oral care.    PULMONARY:  HOB @ 45 degrees. Prednisone 40 mg for 5 dyas. Out patient pulm follow up for PFT. Nicotine patch.     CARDIOVASCULAR: Blood pressure control. Start on oral hypertensive therapy. Keep at current level    GI: GI prophylaxis.  Feeding as tolerated    RENAL:  Follow up lytes.  Correct as needed. Send UA    INFECTIOUS DISEASE: Follow up cultures. No indication for antibiotics for now.     HEMATOLOGICAL:  DVT prophylaxis.    ENDOCRINE:  Follow up FS.  Insulin protocol if needed.    MUSCULOSKELETAL:  Physical therapy Impression:  hyperintensive urgency v. CVA  CKD? Secondary to uncontrolled HTN  Active smoker    IMPRESSION:      CNS: Follow up neurology recommendation. Continue with aspirin and Lipitor     HEENT: Oral care.    PULMONARY:  HOB @ 45 degrees. Prednisone 40 mg for 5 dyas. Out patient pulm follow up for PFT. Nicotine patch.   R Apical fullness check CT chest NC    CARDIOVASCULAR: Blood pressure control. Start on oral hypertensive therapy. Keep at current level    GI: GI prophylaxis.  Feeding as tolerated    RENAL:  Follow up lytes.  Correct as needed. Send UA    INFECTIOUS DISEASE: Follow up cultures. No indication for antibiotics for now.     HEMATOLOGICAL:  DVT prophylaxis.    ENDOCRINE:  Follow up FS.  Insulin protocol if needed.    MUSCULOSKELETAL:  Physical therapy

## 2019-04-01 NOTE — CONSULT NOTE ADULT - ASSESSMENT
IMPRESSION: Rehab of 84 y/o m rehab  for  rt  side  weakness  debility      PRECAUTIONS: [  ] Cardiac  [  ] Respiratory  [  ] Seizures [  ] Contact Isolation  [  ] Droplet Isolation  [ FALL ] Other    Weight Bearing Status:     RECOMMENDATION:    Out of Bed to Chair     DVT/Decubiti Prophylaxis    REHAB PLAN:     [ xx  ] Bedside P/T 3-5 times a week   [   ]   Bedside O/T  2-3 times a week             [   ] No Rehab Therapy Indicated                   [   ]  Speech Therapy   Conditioning/ROM                                    ADL  Bed Mobility                                               Conditioning/ROM  Transfers                                                     Bed Mobility  Sitting /Standing Balance                         Transfers                                        Gait Training                                               Sitting/Standing Balance  Stair Training [   ]Applicable                    Home equipment Eval                                                                        Splinting  [   ] Only      GOALS:   ADL   [   ]   Independent                    Transfers  [   ] Independent                          Ambulation  [   ] Independent     [    ] With device                            [   ]  CG                                                         [   ]  CG                                                                  [   ] CG                            [    ] Min A                                                   [   ] Min A                                                              [   ] Min  A          DISCHARGE PLAN:   [   ]  Good candidate for Intensive Rehabilitation/Hospital based-[facility]A [facility]                                             Will tolerate 3hrs Intensive Rehab Daily                                       [    ]  Short Term Rehab in Skilled Nursing Facility                                       [ xx   ]  Home with Outpatient or VN services                                         [    ]  Possible Candidate for Intensive Hospital based Rehab

## 2019-04-01 NOTE — PHYSICAL THERAPY INITIAL EVALUATION ADULT - GAIT DEVIATIONS NOTED, PT EVAL
stooped posture, dec heel strike/pushoff/decreased weight-shifting ability/decreased step length/decreased darryn

## 2019-04-01 NOTE — SWALLOW BEDSIDE ASSESSMENT ADULT - SLP GENERAL OBSERVATIONS
Pt seen awake upright in bed. Brother present at bedside. Pt is hard of hearing, has amplifier device in place. AOx3. Pleasant and cooperative.

## 2019-04-01 NOTE — SWALLOW BEDSIDE ASSESSMENT ADULT - SLP PERTINENT HISTORY OF CURRENT PROBLEM
Admitted 3/31/19 with RUE numbness/weakness. No TPA administered as pt was outside window. CT of the head was negative. Pending MRI of the brain

## 2019-04-01 NOTE — PHYSICAL THERAPY INITIAL EVALUATION ADULT - IMPAIRMENTS CONTRIBUTING TO GAIT DEVIATIONS, PT EVAL
narrow base of support/decreased endurance ; increased coughing/impaired postural control/impaired balance/decreased strength

## 2019-04-01 NOTE — CONSULT NOTE ADULT - ASSESSMENT
83/M with COPD, HTN, HLD, CKD presents with Rt arm numbness and HTN urgency.    Pt found to have advanced CKD with DMITRIY.    HTN - on Cardene drip, switched to Procardia XL 30 mg qd  - would add Alfa1 blocker Doxazosin 2 mg at night if SBP >160 later during the day  - hold off diuretics for now b/o CKD,, but need CXR to assess volume  - screen for target organ damage - LVH, CM  - UA for protein/microalb/creat ratio,   - screen for secondary HTN - Aldosterone/Renin ratio  - plasma cathecholamine and metanephrines  24 hr urine for catecholamines and metanephrines  - Renal Artery Duppelx    DMITRIY on CKD - baseline creat 2.3 in 2017, which may have progressed by now b/o uncontrolled HTN  - kidney and bladder sono for PVR  - basic w/u for HTN  - SPEP/UPEP/SIF/UIF, Hep B and C profile, XENA C3c4  - check phos, iPTH    COPD - will be started on steroids    D/W ICU team.

## 2019-04-01 NOTE — PROGRESS NOTE ADULT - SUBJECTIVE AND OBJECTIVE BOX
SUBJ:No chest pain or shortness of breath      MEDICATIONS  (STANDING):  aspirin 325 milliGRAM(s) Oral daily  atorvastatin 80 milliGRAM(s) Oral at bedtime  doxazosin 2 milliGRAM(s) Oral at bedtime  guaiFENesin  milliGRAM(s) Oral every 12 hours  heparin  Injectable 5000 Unit(s) SubCutaneous every 8 hours  nicotine - 21 mG/24Hr(s) Patch 1 patch Transdermal daily  NIFEdipine XL 30 milliGRAM(s) Oral daily  pantoprazole    Tablet 40 milliGRAM(s) Oral before breakfast  predniSONE   Tablet 40 milliGRAM(s) Oral daily    MEDICATIONS  (PRN):  ALBUTerol/ipratropium for Nebulization 3 milliLiter(s) Nebulizer every 6 hours PRN Shortness of Breath and/or Wheezing            Vital Signs Last 24 Hrs  T(C): 35.3 (01 Apr 2019 15:01), Max: 35.9 (01 Apr 2019 07:10)  T(F): 95.5 (01 Apr 2019 15:01), Max: 96.6 (01 Apr 2019 07:10)  HR: 76 (01 Apr 2019 09:01) (68 - 98)  BP: 180/95 (01 Apr 2019 09:01) (140/72 - 258/127)  BP(mean): 130 (01 Apr 2019 09:01) (97 - 165)  RR: 18 (01 Apr 2019 19:00) (16 - 24)  SpO2: 95% (01 Apr 2019 09:01) (89% - 96%)      ECG:NML    TTE:    LABS:                        13.7   6.14  )-----------( 190      ( 31 Mar 2019 22:20 )             43.1     04-01    143  |  109  |  34<H>  ----------------------------<  103<H>  4.4   |  21  |  2.5<H>    Ca    8.4<L>      01 Apr 2019 05:24    TPro  6.8  /  Alb  4.2  /  TBili  0.2  /  DBili  <0.2  /  AST  12  /  ALT  9   /  AlkPhos  93  04-01    CARDIAC MARKERS ( 01 Apr 2019 05:24 )  x     / <0.01 ng/mL / x     / x     / 7.6 ng/mL  CARDIAC MARKERS ( 31 Mar 2019 22:20 )  x     / <0.01 ng/mL / x     / x     / x          PT/INR - ( 31 Mar 2019 22:20 )   PT: 11.70 sec;   INR: 1.02 ratio         PTT - ( 31 Mar 2019 22:20 )  PTT:25.6 sec    I&O's Summary    31 Mar 2019 07:01  -  01 Apr 2019 07:00  --------------------------------------------------------  IN: 25 mL / OUT: 660 mL / NET: -635 mL    01 Apr 2019 07:01  -  01 Apr 2019 19:29  --------------------------------------------------------  IN: 0 mL / OUT: 750 mL / NET: -750 mL      BNP

## 2019-04-01 NOTE — CONSULT NOTE ADULT - SUBJECTIVE AND OBJECTIVE BOX
Neurology Consult    Patient is a 83y old  Male who presents with a chief complaint of R hand weakness   Patient reports that about 2.30 pm yesterday he noticed that his right hand was not working well- could not use it/ lift it  he returned from [place] recently- also complains of cough  no fever/ chest pain  no headache/  no weakness in leg- he did not notice a facial droop      HPI:  84 y/o M with PMH COPD not on home O2, current smoker, HLD, presents for constant persistent RUE numbness/weakness x "3-4 or maybe 4-5"hrs which began while sitting down watching TV. unable to specify further time period. No blood thinner use. Denies any hx of strokes in the past. He completed a 20 hour drive from [place] the day ptp. NIHSS 1. Pt was also hypertensive on arrival to 258/127 HR 98. Pt was started on cardene drip, which he remains on this am. CT head neg. Pt was also found to have Cr 2.7, it was 2.3 in 4/2017, today it is downtrending to 2.5. (01 Apr 2019 08:10)      PAST MEDICAL & SURGICAL HISTORY:  [place]  H/O: HTN (hypertension)  Advanced COPD  History of intraocular lens implant: LEFT EYE    Allergies  No Known Allergies    MEDICATIONS  (STANDING):  aspirin 325 milliGRAM(s) Oral daily  atorvastatin 80 milliGRAM(s) Oral at bedtime  atorvastatin 40 milliGRAM(s) Oral at bedtime  doxazosin 2 milliGRAM(s) Oral at bedtime  heparin  Injectable 5000 Unit(s) SubCutaneous every 8 hours  nicotine - 21 mG/24Hr(s) Patch 1 patch Transdermal daily  NIFEdipine XL 30 milliGRAM(s) Oral daily  pantoprazole    Tablet 40 milliGRAM(s) Oral before breakfast  predniSONE   Tablet 40 milliGRAM(s) Oral daily    MEDICATIONS  (PRN):  ALBUTerol/ipratropium for Nebulization 3 milliLiter(s) Nebulizer every 6 hours PRN Shortness of Breath and/or Wheezing      Review of systems:    Constitutional: No fever, weight loss or fatigue    Eyes: No eye pain or discharge  ENMT:  No difficulty hearing; No sinus or throat pain  Neck: No pain or stiffness  Respiratory:cough++, no wheezing, chills or hemoptysis  Cardiovascular: No chest pain, palpitations, shortness of breath, dyspnea on exertion  Gastrointestinal: No abdominal pain, nausea, vomiting or hematemesis; No diarrhea or constipation.   Genitourinary: No dysuria, frequency, hematuria or incontinence  Neurological: As per HPI  Skin: No rashes or lesions   Endocrine: No heat or cold intolerance; No hair loss  Musculoskeletal: No joint pain or swelling  Psychiatric: No depression, anxiety, mood swings  Heme/Lymph: No easy bruising or bleeding gums    Vital Signs Last 24 Hrs  T(C): 35.8 (01 Apr 2019 11:00), Max: 35.9 (01 Apr 2019 07:10)  T(F): 96.5 (01 Apr 2019 11:00), Max: 96.6 (01 Apr 2019 07:10)  HR: 76 (01 Apr 2019 09:01) (68 - 98)  BP: 180/95 (01 Apr 2019 09:01) (140/72 - 258/127)  BP(mean): 130 (01 Apr 2019 09:01) (97 - 165)  RR: 20 (01 Apr 2019 11:00) (16 - 24)  SpO2: 95% (01 Apr 2019 09:01) (89% - 96%)    Neurologic Examination:  alert/ orienetd speech is fluent- but hoarse  comprehension intact pt very hard of hearing- need to speak into his machine  PERRL/EOMI  face is symmetric  tongue is midline- moves well  no facial weakness/ sensory deficit  Tone is normal strength in robert LE and lt UE 5/5  rt UE proximal- and at elbow 4/5  rapid alt movements slow in rt hand-  weak  reflexes 2+  LT/PP robert intact  finger to nose on rt-hits his nose- left intact  no tremor    NIH stroke scale 2    Labs:   CBC Full  -  ( 31 Mar 2019 22:20 )  WBC Count : 6.14 K/uL  RBC Count : 4.41 M/uL  Hemoglobin : 13.7 g/dL  Hematocrit : 43.1 %  Platelet Count - Automated : 190 K/uL  Mean Cell Volume : 97.7 fL  Mean Cell Hemoglobin : 31.1 pg  Mean Cell Hemoglobin Concentration : 31.8 g/dL  Auto Neutrophil # : 4.13 K/uL  Auto Lymphocyte # : 1.21 K/uL  Auto Monocyte # : 0.68 K/uL  Auto Eosinophil # : 0.07 K/uL  Auto Basophil # : 0.03 K/uL  Auto Neutrophil % : 67.3 %  Auto Lymphocyte % : 19.7 %  Auto Monocyte % : 11.1 %  Auto Eosinophil % : 1.1 %  Auto Basophil % : 0.5 %    04-01    143  |  109  |  34<H>  ----------------------------<  103<H>  4.4   |  21  |  2.5<H>    Ca    8.4<L>      01 Apr 2019 05:24    TPro  6.8  /  Alb  4.2  /  TBili  0.2  /  DBili  <0.2  /  AST  12  /  ALT  9   /  AlkPhos  93  04-01    LIVER FUNCTIONS - ( 01 Apr 2019 05:24 )  Alb: 4.2 g/dL / Pro: 6.8 g/dL / ALK PHOS: 93 U/L / ALT: 9 U/L / AST: 12 U/L / GGT: x           PT/INR - ( 31 Mar 2019 22:20 )   PT: 11.70 sec;   INR: 1.02 ratio         PTT - ( 31 Mar 2019 22:20 )  PTT:25.6 sec      Assessment:  This is a 83y Male with h/o acute rt arma nd hand weakness- per patient has improved significantly since yesterday-  likely acute ischemic event-  patient not a TPA candidate as he presented outside the window    carotid doppler shows no significant stenosis on either side-  MRI brain pending  continue aspirin- patient says he has not taken aspirin ever-  increase statin to 80 mg daily  echo report pending

## 2019-04-01 NOTE — CONSULT NOTE ADULT - SUBJECTIVE AND OBJECTIVE BOX
Patient is a 83y old  Male who presents with a chief complaint of acute rt sided weakness/paresthesia    HPI:  Pt is an elderly white male , PMH is noted below. Pt c/o : sudden rt arm weakness and paresthesia about 3 hrs PTA to hospital..  pt denied- head ache, fever, chills, nausea, vomiting.     PAST MEDICAL & SURGICAL HISTORY:  [place] (hard of hearing)  H/O: HTN (hypertension)  Advanced COPD  History of intraocular lens implant: LEFT EYE    Allergies    No Known Allergies    Intolerances      FAMILY HISTORY:    Home Medications:  pravastatin 40 mg oral tablet: 1 tab(s) orally once a day (01 Apr 2019 00:07)    Occupation:  Alochol: Denied  Smoking: Denied  Drug Use: Denied  Marital Status:         ROS: as in HPI; All other systems reviewed are negative    ICU Vital Signs Last 24 Hrs  T(C): 35.8 (01 Apr 2019 00:26), Max: 35.8 (01 Apr 2019 00:26)  T(F): 96.5 (01 Apr 2019 00:26), Max: 96.5 (01 Apr 2019 00:26)  HR: 77 (01 Apr 2019 00:39) (75 - 98)  BP: 187/92 (01 Apr 2019 00:39) (183/89 - 258/127)  BP(mean): 132 (01 Apr 2019 00:39) (132 - 144)  ABP: --  ABP(mean): --  RR: 24 (01 Apr 2019 00:39) (16 - 24)  SpO2: 96% (01 Apr 2019 00:39) (92% - 96%)        Physical Examination:    General: No acute distress.  Alert, oriented, interactive, nonfocal    HEENT: Pupils equal, reactive to light.  Symmetric.    PULM: Clear to auscultation bilaterally, no significant sputum production    CVS: Regular rate and rhythm, no murmurs, rubs, or gallops    ABD: Soft, nondistended, nontender, normoactive bowel sounds, no masses    EXT: No edema, nontender    SKIN: Warm and well perfused, no rashes noted.              I&O's Detail    31 Mar 2019 07:01  -  01 Apr 2019 00:46  --------------------------------------------------------  IN:  Total IN: 0 mL    OUT:    Voided: 50 mL  Total OUT: 50 mL    Total NET: -50 mL            LABS:                        13.7   6.14  )-----------( 190      ( 31 Mar 2019 22:20 )             43.1     31 Mar 2019 22:20    144    |  107    |  35     ----------------------------<  114    4.3     |  25     |  2.7      Ca    8.8        31 Mar 2019 22:20    TPro  7.6    /  Alb  4.6    /  TBili  0.2    /  DBili  x      /  AST  12     /  ALT  11     /  AlkPhos  102    31 Mar 2019 22:20  Amylase x     lipase x          CARDIAC MARKERS ( 31 Mar 2019 22:20 )  x     / <0.01 ng/mL / x     / x     / x          CAPILLARY BLOOD GLUCOSE      POCT Blood Glucose.: 113 mg/dL (31 Mar 2019 21:55)    PT/INR - ( 31 Mar 2019 22:20 )   PT: 11.70 sec;   INR: 1.02 ratio         PTT - ( 31 Mar 2019 22:20 )  PTT:25.6 sec    Culture        MEDICATIONS  (STANDING):  atorvastatin 40 milliGRAM(s) Oral at bedtime  clopidogrel Tablet 75 milliGRAM(s) Oral daily  heparin  Injectable 5000 Unit(s) SubCutaneous every 8 hours  labetalol Injectable 10 milliGRAM(s) IV Push once  pantoprazole    Tablet 40 milliGRAM(s) Oral before breakfast    MEDICATIONS  (PRN):        RADIOLOGY: ***     CXR:  TLC:  OG:  ET tube:          ECHO:      IMPRESSION:        PLAN:    CNS:    HEENT:    PULMONARY:    CARDIOVASCULAR:    GI: GI prophylaxis.  Feeding     RENAL:    INFECTIOUS DISEASE:    HEMATOLOGICAL:  DVT prophylaxis.    ENDOCRINE:  Follow up FS.  Insulin protocol if needed.    MUSCULOSKELETAL:        CRITICAL CARE TIME SPENT: *** Patient is a 83y old  Male who presents with a chief complaint of acute rt sided weakness/paresthesia    HPI:  Pt is an elderly white male , PMH is noted below. Pt c/o : sudden rt arm weakness and paresthesia about 3 hrs PTA to hospital..  pt denied- head ache, fever, chills, nausea, vomiting.  Stroke code was call in ER, CT head is neg for acute CVA. pt also noted to have elevated BP >200/100. w/ creat.- 2.7.      PAST MEDICAL & SURGICAL HISTORY:  [place] (hard of hearing)  H/O: HTN (hypertension)  Advanced COPD  History of intraocular lens implant: LEFT EYE    Allergies    No Known Allergies    Intolerances      FAMILY HISTORY:    Home Medications:  pravastatin 40 mg oral tablet: 1 tab(s) orally once a day (01 Apr 2019 00:07)    Occupation:  AlSiphonLabsol: Denied  Smoking: Denied  Drug Use: Denied  Marital Status:         ROS: as in HPI; All other systems reviewed are negative    ICU Vital Signs Last 24 Hrs  T(C): 35.8 (01 Apr 2019 00:26), Max: 35.8 (01 Apr 2019 00:26)  T(F): 96.5 (01 Apr 2019 00:26), Max: 96.5 (01 Apr 2019 00:26)  HR: 77 (01 Apr 2019 00:39) (75 - 98)  BP: 187/92 (01 Apr 2019 00:39) (183/89 - 258/127)  BP(mean): 132 (01 Apr 2019 00:39) (132 - 144)  ABP: --  ABP(mean): --  RR: 24 (01 Apr 2019 00:39) (16 - 24)  SpO2: 96% (01 Apr 2019 00:39) (92% - 96%)        Physical Examination:    General: elderly white male  No acute distress.  Alert, oriented, interactive, nonfocal    HEENT: Pupils equal, reactive to light.  Symmetric.    PULM: Clear to auscultation bilaterally, no significant sputum production    CVS: Regular rate and rhythm, no murmurs, rubs, or gallops    ABD: Soft, nondistended, nontender, normoactive bowel sounds, no masses    EXT: No edema, nontender    SKIN: Warm and well perfused, no rashes noted.  neuro- cr n 1-12 grossly intact  no -sensory or motor deficits              I&O's Detail    31 Mar 2019 07:01  -  01 Apr 2019 00:46  --------------------------------------------------------  IN:  Total IN: 0 mL    OUT:    Voided: 50 mL  Total OUT: 50 mL    Total NET: -50 mL            LABS:                        13.7   6.14  )-----------( 190      ( 31 Mar 2019 22:20 )             43.1     31 Mar 2019 22:20    144    |  107    |  35     ----------------------------<  114    4.3     |  25     |  2.7      Ca    8.8        31 Mar 2019 22:20    TPro  7.6    /  Alb  4.6    /  TBili  0.2    /  DBili  x      /  AST  12     /  ALT  11     /  AlkPhos  102    31 Mar 2019 22:20  Amylase x     lipase x          CARDIAC MARKERS ( 31 Mar 2019 22:20 )  x     / <0.01 ng/mL / x     / x     / x          CAPILLARY BLOOD GLUCOSE      POCT Blood Glucose.: 113 mg/dL (31 Mar 2019 21:55)    PT/INR - ( 31 Mar 2019 22:20 )   PT: 11.70 sec;   INR: 1.02 ratio         PTT - ( 31 Mar 2019 22:20 )  PTT:25.6 sec    Culture        MEDICATIONS  (STANDING):  atorvastatin 40 milliGRAM(s) Oral at bedtime  clopidogrel Tablet 75 milliGRAM(s) Oral daily  heparin  Injectable 5000 Unit(s) SubCutaneous every 8 hours  labetalol Injectable 10 milliGRAM(s) IV Push once  pantoprazole    Tablet 40 milliGRAM(s) Oral before breakfast    MEDICATIONS  (PRN):        RADIOLOGY: ***   < from: CT Brain Stroke Protocol (03.31.19 @ 22:07) >  IMPRESSION:    No CT evidence for acute intracranial pathology.               ECHO:      IMPRESSION:  1.  acute  rt arm/hand weakness, paresthesia r/o -CVA , TIA  2.  HTN urgency  3. renal failure       PLAN:    case discussed w/ Dr Joseph...  adm to telemetry...  neuro ck q 4, neuro consult   aspirin, plavix, statin,  PT/OT,   f/u- BP, cardiac enzymes, urine chemistries  renal consult                CRITICAL CARE TIME SPENT: *** Patient is a 83y old  Male who presents with a chief complaint of acute rt sided weakness/paresthesia    HPI:  Pt is an elderly white male , PMH is noted below. Pt c/o : sudden rt arm weakness and paresthesia about 3 hrs PTA to hospital..  pt denied- head ache, fever, chills, nausea, vomiting.  Stroke code was call in ER, CT head is neg for acute CVA. pt also noted to have elevated BP >200/100. w/ creat.- 2.7.      PAST MEDICAL & SURGICAL HISTORY:  [place] (hard of hearing)  H/O: HTN (hypertension)  Advanced COPD  History of intraocular lens implant: LEFT EYE    Allergies    No Known Allergies    Intolerances      FAMILY HISTORY:    Home Medications:  pravastatin 40 mg oral tablet: 1 tab(s) orally once a day (01 Apr 2019 00:07)    Occupation:  [employer]ol: Denied  Smoking: active smoker  Drug Use: Denied  Marital Status:         ROS: as in HPI; All other systems reviewed are negative    ICU Vital Signs Last 24 Hrs  T(C): 35.8 (01 Apr 2019 00:26), Max: 35.8 (01 Apr 2019 00:26)  T(F): 96.5 (01 Apr 2019 00:26), Max: 96.5 (01 Apr 2019 00:26)  HR: 77 (01 Apr 2019 00:39) (75 - 98)  BP: 187/92 (01 Apr 2019 00:39) (183/89 - 258/127)  BP(mean): 132 (01 Apr 2019 00:39) (132 - 144)  ABP: --  ABP(mean): --  RR: 24 (01 Apr 2019 00:39) (16 - 24)  SpO2: 96% (01 Apr 2019 00:39) (92% - 96%)        Physical Examination:    General: elderly white male  No acute distress.  Alert, oriented, interactive, nonfocal    HEENT: Pupils equal, reactive to light.  Symmetric.    PULM: Clear to auscultation bilaterally, no significant sputum production    CVS: Regular rate and rhythm, no murmurs, rubs, or gallops    ABD: Soft, nondistended, nontender, normoactive bowel sounds, no masses    EXT: No edema, nontender    SKIN: Warm and well perfused, no rashes noted.  neuro- cr n 1-12 grossly intact  no -sensory or motor deficits              I&O's Detail    31 Mar 2019 07:01  -  01 Apr 2019 00:46  --------------------------------------------------------  IN:  Total IN: 0 mL    OUT:    Voided: 50 mL  Total OUT: 50 mL    Total NET: -50 mL            LABS:                        13.7   6.14  )-----------( 190      ( 31 Mar 2019 22:20 )             43.1     31 Mar 2019 22:20    144    |  107    |  35     ----------------------------<  114    4.3     |  25     |  2.7      Ca    8.8        31 Mar 2019 22:20    TPro  7.6    /  Alb  4.6    /  TBili  0.2    /  DBili  x      /  AST  12     /  ALT  11     /  AlkPhos  102    31 Mar 2019 22:20  Amylase x     lipase x          CARDIAC MARKERS ( 31 Mar 2019 22:20 )  x     / <0.01 ng/mL / x     / x     / x          CAPILLARY BLOOD GLUCOSE      POCT Blood Glucose.: 113 mg/dL (31 Mar 2019 21:55)    PT/INR - ( 31 Mar 2019 22:20 )   PT: 11.70 sec;   INR: 1.02 ratio         PTT - ( 31 Mar 2019 22:20 )  PTT:25.6 sec    Culture        MEDICATIONS  (STANDING):  atorvastatin 40 milliGRAM(s) Oral at bedtime  clopidogrel Tablet 75 milliGRAM(s) Oral daily  heparin  Injectable 5000 Unit(s) SubCutaneous every 8 hours  labetalol Injectable 10 milliGRAM(s) IV Push once  pantoprazole    Tablet 40 milliGRAM(s) Oral before breakfast    MEDICATIONS  (PRN):        RADIOLOGY: ***   < from: CT Brain Stroke Protocol (03.31.19 @ 22:07) >  IMPRESSION:    No CT evidence for acute intracranial pathology.               ECHO:      IMPRESSION:  1.  acute  rt arm/hand weakness, paresthesia r/o -CVA , TIA  2.  HTN urgency  3. renal failure       PLAN:    case discussed w/ Dr Joseph...  adm to telemetry...  neuro ck q 4, neuro consult   aspirin, plavix, statin,  smoking cessation  PT/OT,   f/u- BP, cardiac enzymes, urine chemistries  renal consult                CRITICAL CARE TIME SPENT: ***

## 2019-04-02 LAB
% ALBUMIN: 55.6 % — SIGNIFICANT CHANGE UP
% ALPHA 1: 4.7 % — SIGNIFICANT CHANGE UP
% ALPHA 2: 10.3 % — SIGNIFICANT CHANGE UP
% BETA: 11.3 % — SIGNIFICANT CHANGE UP
% GAMMA: 18.1 % — SIGNIFICANT CHANGE UP
ALBUMIN SERPL ELPH-MCNC: 3.9 G/DL — SIGNIFICANT CHANGE UP (ref 3.5–5.2)
ALBUMIN SERPL ELPH-MCNC: 3.9 G/DL — SIGNIFICANT CHANGE UP (ref 3.6–5.5)
ALBUMIN/GLOB SERPL ELPH: 1.2 RATIO — SIGNIFICANT CHANGE UP
ALDOST SERPL-MCNC: 9.6 NG/DL — SIGNIFICANT CHANGE UP
ALP SERPL-CCNC: 88 U/L — SIGNIFICANT CHANGE UP (ref 30–115)
ALPHA1 GLOB SERPL ELPH-MCNC: 0.3 G/DL — SIGNIFICANT CHANGE UP (ref 0.1–0.4)
ALPHA2 GLOB SERPL ELPH-MCNC: 0.7 G/DL — SIGNIFICANT CHANGE UP (ref 0.5–1)
ALT FLD-CCNC: 8 U/L — SIGNIFICANT CHANGE UP (ref 0–41)
ANION GAP SERPL CALC-SCNC: 12 MMOL/L — SIGNIFICANT CHANGE UP (ref 7–14)
AST SERPL-CCNC: 12 U/L — SIGNIFICANT CHANGE UP (ref 0–41)
B-GLOBULIN SERPL ELPH-MCNC: 0.8 G/DL — SIGNIFICANT CHANGE UP (ref 0.5–1)
BASOPHILS # BLD AUTO: 0.02 K/UL — SIGNIFICANT CHANGE UP (ref 0–0.2)
BASOPHILS NFR BLD AUTO: 0.3 % — SIGNIFICANT CHANGE UP (ref 0–1)
BILIRUB SERPL-MCNC: 0.2 MG/DL — SIGNIFICANT CHANGE UP (ref 0.2–1.2)
BUN SERPL-MCNC: 35 MG/DL — HIGH (ref 10–20)
C3 SERPL-MCNC: 113 MG/DL — SIGNIFICANT CHANGE UP (ref 81–157)
C4 SERPL-MCNC: 24 MG/DL — SIGNIFICANT CHANGE UP (ref 13–39)
CALCIUM SERPL-MCNC: 8.6 MG/DL — SIGNIFICANT CHANGE UP (ref 8.5–10.1)
CHLORIDE SERPL-SCNC: 109 MMOL/L — SIGNIFICANT CHANGE UP (ref 98–110)
CO2 SERPL-SCNC: 23 MMOL/L — SIGNIFICANT CHANGE UP (ref 17–32)
CREAT SERPL-MCNC: 2.4 MG/DL — HIGH (ref 0.7–1.5)
CREATININE, URINE RESULT: 60 MG/DL — SIGNIFICANT CHANGE UP
EOSINOPHIL # BLD AUTO: 0.01 K/UL — SIGNIFICANT CHANGE UP (ref 0–0.7)
EOSINOPHIL NFR BLD AUTO: 0.1 % — SIGNIFICANT CHANGE UP (ref 0–8)
GAMMA GLOBULIN: 1.3 G/DL — SIGNIFICANT CHANGE UP (ref 0.6–1.6)
GLUCOSE SERPL-MCNC: 108 MG/DL — HIGH (ref 70–99)
HBV CORE IGM SER-ACNC: SIGNIFICANT CHANGE UP
HBV SURFACE AB SER-ACNC: SIGNIFICANT CHANGE UP
HBV SURFACE AG SER-ACNC: SIGNIFICANT CHANGE UP
HCT VFR BLD CALC: 40.6 % — LOW (ref 42–52)
HCV AB S/CO SERPL IA: 0.11 S/CO — SIGNIFICANT CHANGE UP (ref 0–0.79)
HCV AB SERPL-IMP: SIGNIFICANT CHANGE UP
HGB BLD-MCNC: 13.1 G/DL — LOW (ref 14–18)
IMM GRANULOCYTES NFR BLD AUTO: 0.1 % — SIGNIFICANT CHANGE UP (ref 0.1–0.3)
INTERPRETATION SERPL IFE-IMP: SIGNIFICANT CHANGE UP
LYMPHOCYTES # BLD AUTO: 1.26 K/UL — SIGNIFICANT CHANGE UP (ref 1.2–3.4)
LYMPHOCYTES # BLD AUTO: 18.3 % — LOW (ref 20.5–51.1)
MCHC RBC-ENTMCNC: 30.7 PG — SIGNIFICANT CHANGE UP (ref 27–31)
MCHC RBC-ENTMCNC: 32.3 G/DL — SIGNIFICANT CHANGE UP (ref 32–37)
MCV RBC AUTO: 95.1 FL — HIGH (ref 80–94)
MONOCYTES # BLD AUTO: 0.74 K/UL — HIGH (ref 0.1–0.6)
MONOCYTES NFR BLD AUTO: 10.8 % — HIGH (ref 1.7–9.3)
NEUTROPHILS # BLD AUTO: 4.83 K/UL — SIGNIFICANT CHANGE UP (ref 1.4–6.5)
NEUTROPHILS NFR BLD AUTO: 70.4 % — SIGNIFICANT CHANGE UP (ref 42.2–75.2)
NRBC # BLD: 0 /100 WBCS — SIGNIFICANT CHANGE UP (ref 0–0)
PHOSPHATE SERPL-MCNC: 2.7 MG/DL — SIGNIFICANT CHANGE UP (ref 2.1–4.9)
PLATELET # BLD AUTO: 186 K/UL — SIGNIFICANT CHANGE UP (ref 130–400)
POTASSIUM SERPL-MCNC: 4.7 MMOL/L — SIGNIFICANT CHANGE UP (ref 3.5–5)
POTASSIUM SERPL-SCNC: 4.7 MMOL/L — SIGNIFICANT CHANGE UP (ref 3.5–5)
PROT ?TM UR-MCNC: 20 MG/DL — HIGH (ref 0–12)
PROT PATTERN SERPL ELPH-IMP: SIGNIFICANT CHANGE UP
PROT SERPL-MCNC: 6.3 G/DL — SIGNIFICANT CHANGE UP (ref 6–8)
PROT SERPL-MCNC: 7.1 G/DL — SIGNIFICANT CHANGE UP (ref 6–8.3)
PROT SERPL-MCNC: 7.1 G/DL — SIGNIFICANT CHANGE UP (ref 6–8.3)
PTH-INTACT FLD-MCNC: 68 PG/ML — HIGH (ref 15–65)
RBC # BLD: 4.27 M/UL — LOW (ref 4.7–6.1)
RBC # FLD: 13.4 % — SIGNIFICANT CHANGE UP (ref 11.5–14.5)
SODIUM SERPL-SCNC: 144 MMOL/L — SIGNIFICANT CHANGE UP (ref 135–146)
WBC # BLD: 6.87 K/UL — SIGNIFICANT CHANGE UP (ref 4.8–10.8)
WBC # FLD AUTO: 6.87 K/UL — SIGNIFICANT CHANGE UP (ref 4.8–10.8)

## 2019-04-02 PROCEDURE — 70551 MRI BRAIN STEM W/O DYE: CPT | Mod: 26

## 2019-04-02 RX ORDER — HALOPERIDOL DECANOATE 100 MG/ML
5 INJECTION INTRAMUSCULAR ONCE
Qty: 0 | Refills: 0 | Status: DISCONTINUED | OUTPATIENT
Start: 2019-04-02 | End: 2019-04-02

## 2019-04-02 RX ORDER — BUDESONIDE AND FORMOTEROL FUMARATE DIHYDRATE 160; 4.5 UG/1; UG/1
2 AEROSOL RESPIRATORY (INHALATION)
Qty: 0 | Refills: 0 | Status: DISCONTINUED | OUTPATIENT
Start: 2019-04-02 | End: 2019-04-04

## 2019-04-02 RX ORDER — TIOTROPIUM BROMIDE 18 UG/1
1 CAPSULE ORAL; RESPIRATORY (INHALATION) DAILY
Qty: 0 | Refills: 0 | Status: DISCONTINUED | OUTPATIENT
Start: 2019-04-02 | End: 2019-04-04

## 2019-04-02 RX ADMIN — Medication 325 MILLIGRAM(S): at 15:56

## 2019-04-02 RX ADMIN — Medication 30 MILLIGRAM(S): at 06:31

## 2019-04-02 RX ADMIN — Medication 2 MILLIGRAM(S): at 21:51

## 2019-04-02 RX ADMIN — Medication 40 MILLIGRAM(S): at 06:31

## 2019-04-02 RX ADMIN — Medication 1 PATCH: at 18:02

## 2019-04-02 RX ADMIN — Medication 3 MILLILITER(S): at 15:45

## 2019-04-02 RX ADMIN — HEPARIN SODIUM 5000 UNIT(S): 5000 INJECTION INTRAVENOUS; SUBCUTANEOUS at 06:31

## 2019-04-02 RX ADMIN — Medication 1 PATCH: at 15:56

## 2019-04-02 RX ADMIN — PANTOPRAZOLE SODIUM 40 MILLIGRAM(S): 20 TABLET, DELAYED RELEASE ORAL at 06:31

## 2019-04-02 RX ADMIN — HEPARIN SODIUM 5000 UNIT(S): 5000 INJECTION INTRAVENOUS; SUBCUTANEOUS at 21:50

## 2019-04-02 RX ADMIN — Medication 600 MILLIGRAM(S): at 18:02

## 2019-04-02 RX ADMIN — ATORVASTATIN CALCIUM 80 MILLIGRAM(S): 80 TABLET, FILM COATED ORAL at 21:51

## 2019-04-02 RX ADMIN — HEPARIN SODIUM 5000 UNIT(S): 5000 INJECTION INTRAVENOUS; SUBCUTANEOUS at 15:57

## 2019-04-02 RX ADMIN — Medication 3 MILLILITER(S): at 08:56

## 2019-04-02 RX ADMIN — BUDESONIDE AND FORMOTEROL FUMARATE DIHYDRATE 2 PUFF(S): 160; 4.5 AEROSOL RESPIRATORY (INHALATION) at 19:48

## 2019-04-02 RX ADMIN — Medication 600 MILLIGRAM(S): at 06:31

## 2019-04-02 NOTE — PROGRESS NOTE ADULT - ASSESSMENT
83/M with COPD, HTN, HLD, CKD presents with Rt arm numbness and HTN urgency.    Pt found to have advanced CKD with DMITRIY.    HTN - better controlled on Procardia XL 30 mg qd and Doxazosin 2 mg qhs  - screen for target organ damage - LVH, CM  - UA for protein/creat - o.4 g/g - mild proteinuria noted  - screen for secondary HTN - Aldosterone/Renin ratio  - plasma cathecholamine and metanephrines  24 hr urine for catecholamines and metanephrines  - Renal Artery Duppelx neg    DMITRIY on CKD - baseline creat 2.3 in 2017, which may have progressed by now b/o uncontrolled HTN  - kidney and bladder sono small right kidny, no hydro b/l  - basic w/u for HTN  - SPEP/UPEP/SIF/UIF, Hep B and C profile, XENA C3c4  - check phos, iPTH    COPD - will be started on steroids    renal f/u in 2-3 weeks after d/c

## 2019-04-02 NOTE — PROGRESS NOTE ADULT - SUBJECTIVE AND OBJECTIVE BOX
Patient is a 83y old  Male who presents with a chief complaint of R hand weakness (2019 19:29)        SUBJECTIVE: NO events overnight.      REVIEW OF SYSTEMS:  See HPI    PHYSICAL EXAM  Vital Signs Last 24 Hrs  T(C): 35.6 (2019 07:05), Max: 35.9 (2019 23:14)  T(F): 96 (2019 07:05), Max: 96.7 (2019 23:14)  HR: 87 (2019 07:07) (73 - 87)  BP: 144/78 (2019 07:07) (144/78 - 198/98)  BP(mean): 103 (2019 07:07) (101 - 135)  RR: 21 (2019 07:05) (18 - 21)  SpO2: 95% (2019 09:01) (95% - 95%)    General: In NAD  HEENT: LEATHA               Lymphatic system: No Cervical LN    Respiratory: Ney BS  Cardiovascular: Regular  Gastrointestinal: Soft. + BS  Musculoskeletal: No clubbing.  moves all extremities.  Full range of motion    Skin: Warm.  Intact  Neurological: No motor or sensory deficit      19 @ 07:01  -  19 @ 07:00  --------------------------------------------------------  IN:  Total IN: 0 mL    OUT:    Voided: 751 mL  Total OUT: 751 mL    Total NET: -751 mL          LABS:                          13.1   6.87  )-----------( 186      ( 2019 05:44 )             40.6                                               04    144  |  109  |  35<H>  ----------------------------<  108<H>  4.7   |  23  |  2.4<H>    Ca    8.6      2019 05:44  Phos  2.7         TPro  6.3  /  Alb  3.9  /  TBili  0.2  /  DBili  x   /  AST  12  /  ALT  8   /  AlkPhos  88        PT/INR - ( 31 Mar 2019 22:20 )   PT: 11.70 sec;   INR: 1.02 ratio         PTT - ( 31 Mar 2019 22:20 )  PTT:25.6 sec                                       Urinalysis Basic - ( 2019 13:50 )    Color: Yellow / Appearance: Clear / S.020 / pH: x  Gluc: x / Ketone: Negative  / Bili: Negative / Urobili: 0.2 mg/dL   Blood: x / Protein: 30 mg/dL / Nitrite: Negative   Leuk Esterase: Negative / RBC: 1-2 /HPF / WBC x   Sq Epi: x / Non Sq Epi: Few /HPF / Bacteria: Few        CARDIAC MARKERS ( 2019 05:24 )  x     / <0.01 ng/mL / x     / x     / 7.6 ng/mL  CARDIAC MARKERS ( 31 Mar 2019 22:20 )  x     / <0.01 ng/mL / x     / x     / x                                                LIVER FUNCTIONS - ( 2019 05:44 )  Alb: 3.9 g/dL / Pro: 6.3 g/dL / ALK PHOS: 88 U/L / ALT: 8 U/L / AST: 12 U/L / GGT: x                                                                MEDICATIONS  (STANDING):  aspirin 325 milliGRAM(s) Oral daily  atorvastatin 80 milliGRAM(s) Oral at bedtime  doxazosin 2 milliGRAM(s) Oral at bedtime  guaiFENesin  milliGRAM(s) Oral every 12 hours  heparin  Injectable 5000 Unit(s) SubCutaneous every 8 hours  nicotine - 21 mG/24Hr(s) Patch 1 patch Transdermal daily  NIFEdipine XL 30 milliGRAM(s) Oral daily  pantoprazole    Tablet 40 milliGRAM(s) Oral before breakfast  predniSONE   Tablet 40 milliGRAM(s) Oral daily    MEDICATIONS  (PRN):  ALBUTerol/ipratropium for Nebulization 3 milliLiter(s) Nebulizer every 6 hours PRN Shortness of Breath and/or Wheezing

## 2019-04-02 NOTE — PROGRESS NOTE ADULT - SUBJECTIVE AND OBJECTIVE BOX
Neurology Follow up note    Name  JARED ROBLES    HPI:  82 y/o M with PMH COPD not on home O2, current smoker, HLD, presents for constant persistent RUE numbness/weakness x "3-4 or maybe 4-5"hrs which began while sitting down watching TV. unable to specify further time period. No blood thinner use. Denies any hx of strokes in the past. He completed a 20 hour drive from [place] the day ptp. NIHSS 1. Pt was also hypertensive on arrival to 258/127 HR 98. Pt was started on cardene drip, which he remains on this am. CT head neg. Pt was also found to have Cr 2.7, it was 2.3 in 4/2017, today it is downtrending to 2.5. (01 Apr 2019 08:10)      NEURO:  PATIENT IS IMPROVED TODAY  NO EVENTS OVERNIGHT  PATIENT IS AWAITING MRI THIS AM        Vital Signs Last 24 Hrs  T(C): 35.6 (02 Apr 2019 07:05), Max: 35.9 (01 Apr 2019 23:14)  T(F): 96 (02 Apr 2019 07:05), Max: 96.7 (01 Apr 2019 23:14)  HR: 87 (02 Apr 2019 07:07) (73 - 87)  BP: 144/78 (02 Apr 2019 07:07) (144/78 - 198/98)  BP(mean): 103 (02 Apr 2019 07:07) (101 - 135)  RR: 21 (02 Apr 2019 07:05) (18 - 21)  SpO2: --    Neurological Exam:   alert/ orienetd speech is fluent-   comprehension intact   PERRL/EOMI  face is symmetric  tongue is midline- moves well  no facial weakness/ sensory deficit  Tone is normal strength in robert LE and lt UE 5/5  rt UE proximal- and at elbow 4/5  rapid alt movements slow in rt hand-  weak  reflexes 2+      Medications  ALBUTerol/ipratropium for Nebulization 3 milliLiter(s) Nebulizer every 6 hours PRN  aspirin 325 milliGRAM(s) Oral daily  atorvastatin 80 milliGRAM(s) Oral at bedtime  buDESOnide 160 MICROgram(s)/formoterol 4.5 MICROgram(s) Inhaler 2 Puff(s) Inhalation two times a day  doxazosin 2 milliGRAM(s) Oral at bedtime  guaiFENesin  milliGRAM(s) Oral every 12 hours  heparin  Injectable 5000 Unit(s) SubCutaneous every 8 hours  nicotine - 21 mG/24Hr(s) Patch 1 patch Transdermal daily  NIFEdipine XL 30 milliGRAM(s) Oral daily  pantoprazole    Tablet 40 milliGRAM(s) Oral before breakfast  predniSONE   Tablet 40 milliGRAM(s) Oral daily  tiotropium 18 MICROgram(s) Capsule 1 Capsule(s) Inhalation daily      Lab  04-02    144  |  109  |  35<H>  ----------------------------<  108<H>  4.7   |  23  |  2.4<H>    Ca    8.6      02 Apr 2019 05:44  Phos  2.7     04-02    TPro  6.3  /  Alb  3.9  /  TBili  0.2  /  DBili  x   /  AST  12  /  ALT  8   /  AlkPhos  88  04-02                          13.1   6.87  )-----------( 186      ( 02 Apr 2019 05:44 )             40.6     LIVER FUNCTIONS - ( 02 Apr 2019 05:44 )  Alb: 3.9 g/dL / Pro: 6.3 g/dL / ALK PHOS: 88 U/L / ALT: 8 U/L / AST: 12 U/L / GGT: x                   Radiology      Assessment:  84 YO MAN WITH RUE WEAKNESS  R/O CVA  Plan:  MRI BRAIN TODAY  CONT ASA AND LIPITOR  CAN DOWNGRADE FROM THE UNIT  PLEASE CALL WITH ANY QUESTIONS

## 2019-04-02 NOTE — PROGRESS NOTE ADULT - SUBJECTIVE AND OBJECTIVE BOX
Nephrology progress note    Patient is seen and examined, events over the last 24 h noted .  No c/o SOB, dizziness.  Allergies:  No Known Allergies    Hospital Medications:   MEDICATIONS  (STANDING):  aspirin 325 milliGRAM(s) Oral daily  atorvastatin 80 milliGRAM(s) Oral at bedtime  buDESOnide 160 MICROgram(s)/formoterol 4.5 MICROgram(s) Inhaler 2 Puff(s) Inhalation two times a day  doxazosin 2 milliGRAM(s) Oral at bedtime  guaiFENesin  milliGRAM(s) Oral every 12 hours  heparin  Injectable 5000 Unit(s) SubCutaneous every 8 hours  nicotine - 21 mG/24Hr(s) Patch 1 patch Transdermal daily  NIFEdipine XL 30 milliGRAM(s) Oral daily  pantoprazole    Tablet 40 milliGRAM(s) Oral before breakfast  predniSONE   Tablet 40 milliGRAM(s) Oral daily  tiotropium 18 MICROgram(s) Capsule 1 Capsule(s) Inhalation daily        VITALS:  T(F): 96 (19 @ 07:05), Max: 96.7 (19 @ 23:14)  HR: 87 (19 @ 07:07)  BP: 144/78 (19 @ 07:07)  RR: 21 (19 @ 07:05)  SpO2: --  Wt(kg): --     @ 07:01  -   @ 07:00  --------------------------------------------------------  IN: 25 mL / OUT: 660 mL / NET: -635 mL     @ 07:01  -   @ 07:00  --------------------------------------------------------  IN: 0 mL / OUT: 751 mL / NET: -751 mL        Weight (kg): 57.5 ( @ 19:00)    PHYSICAL EXAM:  Constitutional: NAD  HEENT: anicteric sclera, oropharynx clear, MMM  Neck: No JVD  Respiratory: CTAB, no wheezes, rales or rhonchi  Cardiovascular: S1, S2, RRR  Gastrointestinal: BS+, soft, NT/ND  Extremities:  No peripheral edema  Neurological: A/O x 3, no focal deficits  : No CVA tenderness. No hernández.   Skin: No rashes  Vascular Access:    LABS:      144  |  109  |  35<H>  ----------------------------<  108<H>  4.7   |  23  |  2.4<H>    Ca    8.6      2019 05:44  Phos  2.7         TPro  6.3  /  Alb  3.9  /  TBili  0.2  /  DBili      /  AST  12  /  ALT  8   /  AlkPhos  88                            13.1   6.87  )-----------( 186      ( 2019 05:44 )             40.6       Urine Studies:  Urinalysis Basic - ( 2019 13:50 )    Color: Yellow / Appearance: Clear / S.020 / pH:   Gluc:  / Ketone: Negative  / Bili: Negative / Urobili: 0.2 mg/dL   Blood:  / Protein: 30 mg/dL / Nitrite: Negative   Leuk Esterase: Negative / RBC: 1-2 /HPF / WBC    Sq Epi:  / Non Sq Epi: Few /HPF / Bacteria: Few      Sodium, Random Urine: 134.0 mmoL/L ( @ 13:50)  Creatinine, Random Urine: 62 mg/dL ( @ 13:50)  Osmolality, Random Urine: 464 mos/kg ( @ 13:50)  Protein/Creatinine Ratio Calculation: 0.4 Ratio ( @ 13:50)  Creatinine, Random Urine: 61 mg/dL ( @ 13:50)    RADIOLOGY & ADDITIONAL STUDIES:  < from: US Renal (19 @ 09:04) >    RIGHT KIDNEY: Normal in echogenicity but atrophic measuring 6.4 cm in   length. No evidence of hydronephrosis, calculus or solid mass. Vascular   flow is demonstrated at the hilum.    LEFT KIDNEY: Normal in echogenicity, size measuring 11.2 cm in length.   There is a mid pole cyst measuring 1.3 cm No evidence of hydronephrosis,   calculus or solid mass. Vascular flow is demonstrated at the hilum.     IMPRESSION:  No hydronephrosis.    Atrophic right kidney.    Unremarkable left kidney.    < end of copied text >  < from: VA Duplex Pelvic Vasculature, Limited (19 @ 17:31) >    No evidence of significant hemodynamic stenosis noted in bilateral renal   arteries. Relatively small kidneys with simple-appearing cysts   bilaterally.    < end of copied text >

## 2019-04-02 NOTE — PROGRESS NOTE ADULT - SUBJECTIVE AND OBJECTIVE BOX
82 y/o M with PMH COPD not on home O2, current smoker, HLD, presents for constant persistent RUE numbness/weakness x "3-4 or maybe 4-5"hrs which began while sitting down watching TV. Pt was Stroke Code, NIHSS 1, no tpa given. CT Head neg. Neuro following.    CARDIAC MARKERS ( 2019 05:24 )  x     / <0.01 ng/mL / x     / x     / 7.6 ng/mL  CARDIAC MARKERS ( 31 Mar 2019 22:20 )  x     / <0.01 ng/mL / x     / x     / x                            13.1   6.87  )-----------( 186      ( 2019 05:44 )             40.6     04-02  144  |  109  |  35<H>  ----------------------------<  108<H>  4.7   |  23  |  2.4<H>    Ca    8.6      2019 05:44  Phos  2.7     04-02    TPro  6.3  /  Alb  3.9  /  TBili  0.2  /  DBili  x   /  AST  12  /  ALT  8   /  AlkPhos  88  04-02      LIVER FUNCTIONS - ( 2019 05:44 )  Alb: 3.9 g/dL / Pro: 6.3 g/dL / ALK PHOS: 88 U/L / ALT: 8 U/L / AST: 12 U/L / GGT: x             PT/INR - ( 31 Mar 2019 22:20 )   PT: 11.70 sec;   INR: 1.02 ratio         PTT - ( 31 Mar 2019 22:20 )  PTT:25.6 sec    Urinalysis Basic - ( 2019 13:50 )    Color: Yellow / Appearance: Clear / S.020 / pH: x  Gluc: x / Ketone: Negative  / Bili: Negative / Urobili: 0.2 mg/dL   Blood: x / Protein: 30 mg/dL / Nitrite: Negative   Leuk Esterase: Negative / RBC: 1-2 /HPF / WBC x   Sq Epi: x / Non Sq Epi: Few /HPF / Bacteria: Few        I&O's Summary    2019 07:01  -  2019 07:00  --------------------------------------------------------  IN: 0 mL / OUT: 751 mL / NET: -751 mL      Physical Exam  General: hearing impaired  Cardiac: RRR, S1S2  Lungs: +wheezing when coughing  Abd: NTND, +BS  LE: no swelling  Neuro: improved dexterity of RUE, better able to drink a cup of water  no new focal deficits noted  AAOx3

## 2019-04-02 NOTE — PROGRESS NOTE ADULT - ASSESSMENT
RUE Weakness r/o Stroke  -CT head neg  -check MRI  -on asa, statin    HTN  -improving on  Nifedipine     COPD, active smoker  -educated to stop smoking   nicotine patch   - ct chest as above    CKD 3 vs Progression of CKD, could be 2/2 long standing HTN  -trend Cr, followup urine studies  -control bp better  - nephrology following    DVT PPX: hep sq

## 2019-04-02 NOTE — PROGRESS NOTE ADULT - ASSESSMENT
Impression:  hyperintensive urgency v. CVA  CKD? Secondary to uncontrolled HTN  Active smoker'  Emphysema with upper lobe bullae on CT chest    IMPRESSION:      CNS: Follow up neurology recommendation. Continue with aspirin and Lipitor. MRI head as per neuro.     HEENT: Oral care.    PULMONARY:  HOB @ 45 degrees. Prednisone 40 mg for 5 dyas. Out patient pulm follow up for PFT. Nicotine patch. Triple therapy. Smoking cessation. Pulmonary rehab on discharge     CARDIOVASCULAR: Blood pressure control.     GI: GI prophylaxis.  Feeding as tolerated    RENAL:  Follow up lytes.  Correct as needed.    INFECTIOUS DISEASE: Follow up cultures. No indication for antibiotics for now.     HEMATOLOGICAL:  DVT prophylaxis.    ENDOCRINE:  Follow up FS.  Insulin protocol if needed.    MUSCULOSKELETAL:  Physical therapy    Downgrade to medicine. Impression:  hyperintensive urgency v. CVA  CKD? Secondary to uncontrolled HTN  Active smoker'  Emphysema with upper lobe bullae on CT chest    IMPRESSION:    CNS: Follow up neurology recommendation. Continue with aspirin and Lipitor. MRI head as per neuro.     HEENT: Oral care.    PULMONARY:  HOB @ 45 degrees. Prednisone 40 mg for 5 dyas. Out patient pulm follow up for PFT. Nicotine patch. Triple therapy. Smoking cessation. Pulmonary rehab on discharge     CARDIOVASCULAR: Blood pressure control.     GI: GI prophylaxis.  Feeding as tolerated    RENAL:  Follow up lytes.  Correct as needed.    INFECTIOUS DISEASE: Follow up cultures. No indication for antibiotics for now.     HEMATOLOGICAL:  DVT prophylaxis.    ENDOCRINE:  Follow up FS.  Insulin protocol if needed.    MUSCULOSKELETAL:  Physical therapy    Downgrade to medicine.

## 2019-04-02 NOTE — PROGRESS NOTE ADULT - SUBJECTIVE AND OBJECTIVE BOX
Patient was eating breakfast this am using utensils with both hands, He reports he is almost back to baseline      T(F): 96 (04-02-19 @ 07:05), Max: 96.7 (04-01-19 @ 23:14)  HR: 87 (04-02-19 @ 07:07)  BP: 144/78 (04-02-19 @ 07:07)  RR: 20      PHYSICAL EXAM:  GENERAL: NAD  HEAD:  Atraumatic, Normocephalic  NERVOUS SYSTEM:  Alert & Oriented X3, no motor deficits   CHEST/LUNG: Clear to percussion bilaterally; No rales, rhonchi, wheezing, or rubs  HEART: Regular rate and rhythm; No murmurs, rubs, or gallops  ABDOMEN: Soft, Nontender, Nondistended; Bowel sounds present  EXTREMITIES:  2+ Peripheral Pulses, No clubbing, cyanosis, or edema    LABS  04-02    144  |  109  |  35<H>  ----------------------------<  108<H>  4.7   |  23  |  2.4<H>    Ca    8.6      02 Apr 2019 05:44  Phos  2.7     04-02    TPro  6.3  /  Alb  3.9  /  TBili  0.2  /  DBili  x   /  AST  12  /  ALT  8   /  AlkPhos  88  04-02                          13.1   6.87  )-----------( 186      ( 02 Apr 2019 05:44 )             40.6     PT/INR - ( 31 Mar 2019 22:20 )   PT: 11.70 sec;   INR: 1.02 ratio         PTT - ( 31 Mar 2019 22:20 )  PTT:25.6 sec    CARDIAC ENZYMES    CKMB Units: 7.6 (04-01 @ 05:24)    Troponin T, Serum: <0.01 ng/mL (04-01-19 @ 05:24)  Troponin T, Serum: <0.01 ng/mL (03-31-19 @ 22:20)      RADIOLOGY  < from: CT Brain Stroke Protocol (03.31.19 @ 22:07) >  IMPRESSION:    No CT evidence for acute intracranial pathology.     < from: CT Chest No Cont (04.01.19 @ 17:34) >  IMPRESSION:      1. Biapical scarring which appears nodular on the right, correlating to   the previously described radiographic finding. Additional 1 cm linear   opacity in the left upper lobe, likely representing an area of scarring.   CT the chest in 3 months is recommended for follow-up.    < end of copied text >      MEDICATIONS  (STANDING):  aspirin 325 milliGRAM(s) Oral daily  atorvastatin 80 milliGRAM(s) Oral at bedtime  buDESOnide 160 MICROgram(s)/formoterol 4.5 MICROgram(s) Inhaler 2 Puff(s) Inhalation two times a day  doxazosin 2 milliGRAM(s) Oral at bedtime  guaiFENesin  milliGRAM(s) Oral every 12 hours  heparin  Injectable 5000 Unit(s) SubCutaneous every 8 hours  nicotine - 21 mG/24Hr(s) Patch 1 patch Transdermal daily  NIFEdipine XL 30 milliGRAM(s) Oral daily  pantoprazole    Tablet 40 milliGRAM(s) Oral before breakfast  predniSONE   Tablet 40 milliGRAM(s) Oral daily  tiotropium 18 MICROgram(s) Capsule 1 Capsule(s) Inhalation daily    MEDICATIONS  (PRN):  ALBUTerol/ipratropium for Nebulization 3 milliLiter(s) Nebulizer every 6 hours PRN Shortness of Breath and/or Wheezing

## 2019-04-02 NOTE — PROGRESS NOTE ADULT - ASSESSMENT
RUE Weakness likely 2/2 Stroke  -CT head neg, scheduled to go to for MRI Head today  -on asa 325, statin  -as per Dr Ramos, pt can be downgraded to regular medical floor    HTN  -c/w nifedepine and doxazosin, both of which was started on this admission    COPD, active smoker  -educated to stop smoking  -c/w nicotine patch if needed  -started pm  symbicort, spiriva  -cxr shows R upper extremity opacity/scarring, CT Chest shows Biapical scarring which appears nodular on the right  -repeat ct chest in 3 months in 7-8/2019 as outpt    DMITRIY on CKD vs Progression of CKD, could be 2/2 long standing HTN  -Cr downtrending, mild proteinuria noted  -kidney and bladder sono small right kidny, no hydro b/l  -Secondary htn workup: renal arterial duplex neg, all serum w/up pending still  -once d/c needs Renal f/up in 2-3wks    Mild dilation of the ascending aorta, 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 RUE Weakness likely 2/2 Stroke  -CT head neg, scheduled to go to for MRI Head today  -on asa 325, statin  -as per Dr Ramos, pt can be downgraded to regular medical floor    HTN  -c/w nifedepine and doxazosin, both of which was started on this admission    COPD, active smoker  -educated to stop smoking  -c/w nicotine patch if needed  -started pm  symbicort, spiriva  -stop prednisone after 5 days on 4/5/2018  -cxr shows R upper extremity opacity/scarring, CT Chest shows Biapical scarring which appears nodular on the right  -repeat ct chest in 3 months in 7-8/2019 as outpt    DMITRIY on CKD vs Progression of CKD, could be 2/2 long standing HTN  -Cr downtrending, mild proteinuria noted  -kidney and bladder sono small right kidny, no hydro b/l  -Secondary htn workup: renal arterial duplex neg, all serum w/up pending still  -once d/c needs Renal f/up in 2-3wks    Mild dilation of the ascending aorta, 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

## 2019-04-03 LAB
ALBUMIN SERPL ELPH-MCNC: 3.7 G/DL — SIGNIFICANT CHANGE UP (ref 3.5–5.2)
ALBUMIN SERPL ELPH-MCNC: 4 G/DL — SIGNIFICANT CHANGE UP (ref 3.5–5.2)
ALBUMIN SERPL ELPH-MCNC: 4 G/DL — SIGNIFICANT CHANGE UP (ref 3.5–5.2)
ALP SERPL-CCNC: 74 U/L — SIGNIFICANT CHANGE UP (ref 30–115)
ALP SERPL-CCNC: 81 U/L — SIGNIFICANT CHANGE UP (ref 30–115)
ALP SERPL-CCNC: 84 U/L — SIGNIFICANT CHANGE UP (ref 30–115)
ALT FLD-CCNC: 10 U/L — SIGNIFICANT CHANGE UP (ref 0–41)
ALT FLD-CCNC: 12 U/L — SIGNIFICANT CHANGE UP (ref 0–41)
ALT FLD-CCNC: 12 U/L — SIGNIFICANT CHANGE UP (ref 0–41)
ANION GAP SERPL CALC-SCNC: 12 MMOL/L — SIGNIFICANT CHANGE UP (ref 7–14)
ANION GAP SERPL CALC-SCNC: 12 MMOL/L — SIGNIFICANT CHANGE UP (ref 7–14)
ANION GAP SERPL CALC-SCNC: 14 MMOL/L — SIGNIFICANT CHANGE UP (ref 7–14)
AST SERPL-CCNC: 22 U/L — SIGNIFICANT CHANGE UP (ref 0–41)
AST SERPL-CCNC: 23 U/L — SIGNIFICANT CHANGE UP (ref 0–41)
AST SERPL-CCNC: 25 U/L — SIGNIFICANT CHANGE UP (ref 0–41)
AUTO DIFF PNL BLD: NEGATIVE — SIGNIFICANT CHANGE UP
BASOPHILS # BLD AUTO: 0.01 K/UL — SIGNIFICANT CHANGE UP (ref 0–0.2)
BASOPHILS NFR BLD AUTO: 0.1 % — SIGNIFICANT CHANGE UP (ref 0–1)
BILIRUB SERPL-MCNC: 0.2 MG/DL — SIGNIFICANT CHANGE UP (ref 0.2–1.2)
BILIRUB SERPL-MCNC: <0.2 MG/DL — SIGNIFICANT CHANGE UP (ref 0.2–1.2)
BILIRUB SERPL-MCNC: <0.2 MG/DL — SIGNIFICANT CHANGE UP (ref 0.2–1.2)
BUN SERPL-MCNC: 44 MG/DL — HIGH (ref 10–20)
BUN SERPL-MCNC: 47 MG/DL — HIGH (ref 10–20)
BUN SERPL-MCNC: 54 MG/DL — HIGH (ref 10–20)
C-ANCA SER-ACNC: NEGATIVE — SIGNIFICANT CHANGE UP
CALCIUM SERPL-MCNC: 8.4 MG/DL — LOW (ref 8.5–10.1)
CALCIUM SERPL-MCNC: 8.4 MG/DL — LOW (ref 8.5–10.1)
CALCIUM SERPL-MCNC: 8.8 MG/DL — SIGNIFICANT CHANGE UP (ref 8.4–10.5)
CALCIUM SERPL-MCNC: 8.8 MG/DL — SIGNIFICANT CHANGE UP (ref 8.5–10.1)
CHLORIDE SERPL-SCNC: 105 MMOL/L — SIGNIFICANT CHANGE UP (ref 98–110)
CHLORIDE SERPL-SCNC: 106 MMOL/L — SIGNIFICANT CHANGE UP (ref 98–110)
CHLORIDE SERPL-SCNC: 106 MMOL/L — SIGNIFICANT CHANGE UP (ref 98–110)
CO2 SERPL-SCNC: 23 MMOL/L — SIGNIFICANT CHANGE UP (ref 17–32)
CO2 SERPL-SCNC: 24 MMOL/L — SIGNIFICANT CHANGE UP (ref 17–32)
CO2 SERPL-SCNC: 24 MMOL/L — SIGNIFICANT CHANGE UP (ref 17–32)
CREAT SERPL-MCNC: 2.7 MG/DL — HIGH (ref 0.7–1.5)
CREAT SERPL-MCNC: 2.8 MG/DL — HIGH (ref 0.7–1.5)
CREAT SERPL-MCNC: 3.5 MG/DL — HIGH (ref 0.7–1.5)
EOSINOPHIL # BLD AUTO: 0 K/UL — SIGNIFICANT CHANGE UP (ref 0–0.7)
EOSINOPHIL NFR BLD AUTO: 0 % — SIGNIFICANT CHANGE UP (ref 0–8)
GLUCOSE BLDC GLUCOMTR-MCNC: 87 MG/DL — SIGNIFICANT CHANGE UP (ref 70–99)
GLUCOSE SERPL-MCNC: 111 MG/DL — HIGH (ref 70–99)
GLUCOSE SERPL-MCNC: 141 MG/DL — HIGH (ref 70–99)
GLUCOSE SERPL-MCNC: 98 MG/DL — SIGNIFICANT CHANGE UP (ref 70–99)
HCT VFR BLD CALC: 38.4 % — LOW (ref 42–52)
HGB BLD-MCNC: 12.4 G/DL — LOW (ref 14–18)
IMM GRANULOCYTES NFR BLD AUTO: 0.4 % — HIGH (ref 0.1–0.3)
LYMPHOCYTES # BLD AUTO: 1.16 K/UL — LOW (ref 1.2–3.4)
LYMPHOCYTES # BLD AUTO: 14.3 % — LOW (ref 20.5–51.1)
MCHC RBC-ENTMCNC: 30.8 PG — SIGNIFICANT CHANGE UP (ref 27–31)
MCHC RBC-ENTMCNC: 32.3 G/DL — SIGNIFICANT CHANGE UP (ref 32–37)
MCV RBC AUTO: 95.5 FL — HIGH (ref 80–94)
MONOCYTES # BLD AUTO: 0.85 K/UL — HIGH (ref 0.1–0.6)
MONOCYTES NFR BLD AUTO: 10.5 % — HIGH (ref 1.7–9.3)
NEUTROPHILS # BLD AUTO: 6.06 K/UL — SIGNIFICANT CHANGE UP (ref 1.4–6.5)
NEUTROPHILS NFR BLD AUTO: 74.7 % — SIGNIFICANT CHANGE UP (ref 42.2–75.2)
NRBC # BLD: 0 /100 WBCS — SIGNIFICANT CHANGE UP (ref 0–0)
P-ANCA SER-ACNC: NEGATIVE — SIGNIFICANT CHANGE UP
PLATELET # BLD AUTO: 177 K/UL — SIGNIFICANT CHANGE UP (ref 130–400)
POTASSIUM SERPL-MCNC: 5.2 MMOL/L — HIGH (ref 3.5–5)
POTASSIUM SERPL-MCNC: 5.6 MMOL/L — HIGH (ref 3.5–5)
POTASSIUM SERPL-MCNC: 6.2 MMOL/L — CRITICAL HIGH (ref 3.5–5)
POTASSIUM SERPL-SCNC: 5.2 MMOL/L — HIGH (ref 3.5–5)
POTASSIUM SERPL-SCNC: 5.6 MMOL/L — HIGH (ref 3.5–5)
POTASSIUM SERPL-SCNC: 6.2 MMOL/L — CRITICAL HIGH (ref 3.5–5)
PROT SERPL-MCNC: 6.2 G/DL — SIGNIFICANT CHANGE UP (ref 6–8)
PROT SERPL-MCNC: 6.4 G/DL — SIGNIFICANT CHANGE UP (ref 6–8)
PROT SERPL-MCNC: 6.6 G/DL — SIGNIFICANT CHANGE UP (ref 6–8)
RBC # BLD: 4.02 M/UL — LOW (ref 4.7–6.1)
RBC # FLD: 13.8 % — SIGNIFICANT CHANGE UP (ref 11.5–14.5)
SODIUM SERPL-SCNC: 141 MMOL/L — SIGNIFICANT CHANGE UP (ref 135–146)
SODIUM SERPL-SCNC: 141 MMOL/L — SIGNIFICANT CHANGE UP (ref 135–146)
SODIUM SERPL-SCNC: 144 MMOL/L — SIGNIFICANT CHANGE UP (ref 135–146)
WBC # BLD: 8.11 K/UL — SIGNIFICANT CHANGE UP (ref 4.8–10.8)
WBC # FLD AUTO: 8.11 K/UL — SIGNIFICANT CHANGE UP (ref 4.8–10.8)

## 2019-04-03 RX ORDER — INSULIN HUMAN 100 [IU]/ML
10 INJECTION, SOLUTION SUBCUTANEOUS ONCE
Qty: 0 | Refills: 0 | Status: COMPLETED | OUTPATIENT
Start: 2019-04-03 | End: 2019-04-03

## 2019-04-03 RX ORDER — DEXTROSE 50 % IN WATER 50 %
50 SYRINGE (ML) INTRAVENOUS ONCE
Qty: 0 | Refills: 0 | Status: COMPLETED | OUTPATIENT
Start: 2019-04-03 | End: 2019-04-03

## 2019-04-03 RX ORDER — GUAIFENESIN/DEXTROMETHORPHAN 600MG-30MG
10 TABLET, EXTENDED RELEASE 12 HR ORAL
Qty: 0 | Refills: 0 | Status: DISCONTINUED | OUTPATIENT
Start: 2019-04-03 | End: 2019-04-04

## 2019-04-03 RX ORDER — SODIUM POLYSTYRENE SULFONATE 4.1 MEQ/G
30 POWDER, FOR SUSPENSION ORAL ONCE
Qty: 0 | Refills: 0 | Status: COMPLETED | OUTPATIENT
Start: 2019-04-03 | End: 2019-04-03

## 2019-04-03 RX ORDER — INSULIN HUMAN 100 [IU]/ML
10 INJECTION, SOLUTION SUBCUTANEOUS ONCE
Qty: 0 | Refills: 0 | Status: DISCONTINUED | OUTPATIENT
Start: 2019-04-03 | End: 2019-04-03

## 2019-04-03 RX ADMIN — HEPARIN SODIUM 5000 UNIT(S): 5000 INJECTION INTRAVENOUS; SUBCUTANEOUS at 21:45

## 2019-04-03 RX ADMIN — Medication 1 PATCH: at 12:35

## 2019-04-03 RX ADMIN — HEPARIN SODIUM 5000 UNIT(S): 5000 INJECTION INTRAVENOUS; SUBCUTANEOUS at 16:09

## 2019-04-03 RX ADMIN — BUDESONIDE AND FORMOTEROL FUMARATE DIHYDRATE 2 PUFF(S): 160; 4.5 AEROSOL RESPIRATORY (INHALATION) at 21:42

## 2019-04-03 RX ADMIN — PANTOPRAZOLE SODIUM 40 MILLIGRAM(S): 20 TABLET, DELAYED RELEASE ORAL at 06:16

## 2019-04-03 RX ADMIN — Medication 1 PATCH: at 12:39

## 2019-04-03 RX ADMIN — TIOTROPIUM BROMIDE 1 CAPSULE(S): 18 CAPSULE ORAL; RESPIRATORY (INHALATION) at 16:11

## 2019-04-03 RX ADMIN — Medication 50 MILLILITER(S): at 21:09

## 2019-04-03 RX ADMIN — ATORVASTATIN CALCIUM 80 MILLIGRAM(S): 80 TABLET, FILM COATED ORAL at 21:41

## 2019-04-03 RX ADMIN — Medication 40 MILLIGRAM(S): at 06:16

## 2019-04-03 RX ADMIN — Medication 3 MILLILITER(S): at 00:24

## 2019-04-03 RX ADMIN — SODIUM POLYSTYRENE SULFONATE 30 GRAM(S): 4.1 POWDER, FOR SUSPENSION ORAL at 21:39

## 2019-04-03 RX ADMIN — Medication 2 MILLIGRAM(S): at 21:41

## 2019-04-03 RX ADMIN — HEPARIN SODIUM 5000 UNIT(S): 5000 INJECTION INTRAVENOUS; SUBCUTANEOUS at 06:15

## 2019-04-03 RX ADMIN — Medication 30 MILLIGRAM(S): at 06:16

## 2019-04-03 RX ADMIN — INSULIN HUMAN 10 UNIT(S): 100 INJECTION, SOLUTION SUBCUTANEOUS at 21:54

## 2019-04-03 RX ADMIN — Medication 600 MILLIGRAM(S): at 06:16

## 2019-04-03 RX ADMIN — BUDESONIDE AND FORMOTEROL FUMARATE DIHYDRATE 2 PUFF(S): 160; 4.5 AEROSOL RESPIRATORY (INHALATION) at 12:35

## 2019-04-03 RX ADMIN — Medication 325 MILLIGRAM(S): at 12:38

## 2019-04-03 RX ADMIN — Medication 10 MILLILITER(S): at 17:19

## 2019-04-03 NOTE — PROGRESS NOTE ADULT - SUBJECTIVE AND OBJECTIVE BOX
Patient is a 83y old  Male who presents with a chief complaint of R hand weakness (2019 05:38)        Over Night Events:        ROS:  See HPI    PHYSICAL EXAM    ICU Vital Signs Last 24 Hrs  T(C): 35.7 (2019 07:05), Max: 35.8 (2019 23:04)  T(F): 96.2 (2019 07:05), Max: 96.4 (2019 23:04)  HR: 105 (2019 23:04) (92 - 115)  BP: 165/76 (2019 23:04) (147/71 - 165/76)  BP(mean): 109 (2019 23:04) (102 - 109)  ABP: --  ABP(mean): --  RR: 20 (2019 07:05) (20 - 20)  SpO2: 96% (2019 10:53) (96% - 96%)      General:  HEENT: LEATHA             Lymphatic system: No cervical LN   Lungs: Bilateral BS  Cardiovascular: Regular   Gastrointestinal: Soft, Positive BS  Extremities: No clubbing.  Moves extremities.  Full Range of motion   Skin: Warm, intact  Neurological: No motor or sensory deficit       19 @ 07:01  -  - @ 07:00  --------------------------------------------------------  IN:  Total IN: 0 mL    OUT:    Voided: 1 mL  Total OUT: 1 mL    Total NET: -1 mL          LABS:                            12.4   8.11  )-----------( 177      ( 2019 05:25 )             38.4                                               04-03    144  |  106  |  47<H>  ----------------------------<  98  5.2<H>   |  24  |  2.8<H>    Ca    8.4<L>      2019 05:25  Phos  2.7     04-02    TPro  6.2  /  Alb  3.7  /  TBili  <0.2  /  DBili  x   /  AST  22  /  ALT  10  /  AlkPhos  74  04-03                                             Urinalysis Basic - ( 2019 13:50 )    Color: Yellow / Appearance: Clear / S.020 / pH: x  Gluc: x / Ketone: Negative  / Bili: Negative / Urobili: 0.2 mg/dL   Blood: x / Protein: 30 mg/dL / Nitrite: Negative   Leuk Esterase: Negative / RBC: 1-2 /HPF / WBC x   Sq Epi: x / Non Sq Epi: Few /HPF / Bacteria: Few                                                  LIVER FUNCTIONS - ( 2019 05:25 )  Alb: 3.7 g/dL / Pro: 6.2 g/dL / ALK PHOS: 74 U/L / ALT: 10 U/L / AST: 22 U/L / GGT: x                                                                MEDICATIONS  (STANDING):  aspirin 325 milliGRAM(s) Oral daily  atorvastatin 80 milliGRAM(s) Oral at bedtime  buDESOnide 160 MICROgram(s)/formoterol 4.5 MICROgram(s) Inhaler 2 Puff(s) Inhalation two times a day  doxazosin 2 milliGRAM(s) Oral at bedtime  guaiFENesin  milliGRAM(s) Oral every 12 hours  heparin  Injectable 5000 Unit(s) SubCutaneous every 8 hours  nicotine - 21 mG/24Hr(s) Patch 1 patch Transdermal daily  NIFEdipine XL 30 milliGRAM(s) Oral daily  pantoprazole    Tablet 40 milliGRAM(s) Oral before breakfast  predniSONE   Tablet 40 milliGRAM(s) Oral daily  tiotropium 18 MICROgram(s) Capsule 1 Capsule(s) Inhalation daily    MEDICATIONS  (PRN):  ALBUTerol/ipratropium for Nebulization 3 milliLiter(s) Nebulizer every 6 hours PRN Shortness of Breath and/or Wheezing      Xrays:                                                                                     ECHO

## 2019-04-03 NOTE — PROGRESS NOTE ADULT - SUBJECTIVE AND OBJECTIVE BOX
Nephrology progress note    Patient is seen and examined, events over the last 24 h noted .  No complaints but K and creat is rising.    Allergies:  No Known Allergies    Hospital Medications:   MEDICATIONS  (STANDING):  aspirin 325 milliGRAM(s) Oral daily  atorvastatin 80 milliGRAM(s) Oral at bedtime  buDESOnide 160 MICROgram(s)/formoterol 4.5 MICROgram(s) Inhaler 2 Puff(s) Inhalation two times a day  doxazosin 2 milliGRAM(s) Oral at bedtime  guaiFENesin  milliGRAM(s) Oral every 12 hours  heparin  Injectable 5000 Unit(s) SubCutaneous every 8 hours  nicotine - 21 mG/24Hr(s) Patch 1 patch Transdermal daily  NIFEdipine XL 30 milliGRAM(s) Oral daily  pantoprazole    Tablet 40 milliGRAM(s) Oral before breakfast  predniSONE   Tablet 40 milliGRAM(s) Oral daily  tiotropium 18 MICROgram(s) Capsule 1 Capsule(s) Inhalation daily        VITALS:  T(F): 96.2 (19 @ 07:05), Max: 96.4 (19 @ 23:04)  HR: 105 (19 @ 23:04)  BP: 165/76 (19 @ 23:04)  RR: 20 (19 @ 07:05)  SpO2: --  Wt(kg): --     @ 07:01  -  02 @ 07:00  --------------------------------------------------------  IN: 0 mL / OUT: 751 mL / NET: -751 mL     @ 07:01  -  03 @ 07:00  --------------------------------------------------------  IN: 0 mL / OUT: 1 mL / NET: -1 mL          PHYSICAL EXAM:  Constitutional: NAD  HEENT: anicteric sclera, oropharynx clear, MMM  Neck: No JVD  Respiratory: CTAB, no wheezes, rales or rhonchi  Cardiovascular: S1, S2, RRR  Gastrointestinal: BS+, soft, NT/ND  Extremities:  No peripheral edema  Neurological: A/O x 3  : No CVA tenderness. No hernández.   Skin: No rashes  Vascular Access:    LABS:      144  |  106  |  47<H>  ----------------------------<  98  5.2<H>   |  24  |  2.8<H>    Ca    8.4<L>      2019 05:25  Phos  2.7         TPro  6.2  /  Alb  3.7  /  TBili  <0.2  /  DBili      /  AST  22  /  ALT  10  /  AlkPhos  74                            12.4   8.11  )-----------( 177      ( 2019 05:25 )             38.4       Urine Studies:  Urinalysis Basic - ( 2019 13:50 )    Color: Yellow / Appearance: Clear / S.020 / pH:   Gluc:  / Ketone: Negative  / Bili: Negative / Urobili: 0.2 mg/dL   Blood:  / Protein: 30 mg/dL / Nitrite: Negative   Leuk Esterase: Negative / RBC: 1-2 /HPF / WBC    Sq Epi:  / Non Sq Epi: Few /HPF / Bacteria: Few      Sodium, Random Urine: 134.0 mmoL/L ( @ 13:50)  Creatinine, Random Urine: 62 mg/dL ( @ 13:50)  Osmolality, Random Urine: 464 mos/kg ( @ 13:50)  Protein/Creatinine Ratio Calculation: 0.4 Ratio ( @ 13:50)  Creatinine, Random Urine: 61 mg/dL ( @ 13:50)    RADIOLOGY & ADDITIONAL STUDIES:

## 2019-04-03 NOTE — PROGRESS NOTE ADULT - ASSESSMENT
Impression:  Hyperintensive urgency v. CVA  Left frontoparietal stroke  CKD? Secondary to uncontrolled HTN  Active smoker'  Emphysema with upper lobe bullae on CT chest  ? Right upper lobe Scarring     IMPRESSION:    CNS: Follow up neurology recommendation. Continue with aspirin and Lipitor.     HEENT: Oral care.    PULMONARY:  HOB @ 45 degrees. Prednisone 40 mg for 5 dyas. Out patient pulm follow up for PFT. Nicotine patch. Triple therapy. Smoking cessation. Pulmonary rehab on discharge     CARDIOVASCULAR: Blood pressure control.     GI: GI prophylaxis.  Feeding as tolerated    RENAL:  Follow up lytes.  Correct as needed.    INFECTIOUS DISEASE: Follow up cultures. No indication for antibiotics for now.     HEMATOLOGICAL:  DVT prophylaxis.    ENDOCRINE:  Follow up FS.  Insulin protocol if needed.    MUSCULOSKELETAL:  Physical therapy  Downgrade to medicine.

## 2019-04-03 NOTE — PROGRESS NOTE ADULT - ASSESSMENT
RUE Weakness likely 2/2 Stroke  -CT head neg, MRI shows small acute L fronto-parietal infarct  -on asa 325, statin  -as per Dr Ramos, pt can be d/c home    HTN  -c/w nifedepine and doxazosin, both of which was started on this admission    COPD, active smoker  -educated to stop smoking  -c/w nicotine patch if needed  -started symbicort, spiriva  -stop prednisone after 5 days on 4/5/2018  -CT Chest shows Biapical scarring which appears nodular on the right  -repeat ct chest in 3 months in 7-8/2019 as outpt    DMITRIY on CKD vs Progression of CKD, could be 2/2 long standing HTN  -Cr uptrending with K to 5.2, will check bladder scan for retention and encourage oral hydration  -Dr Woods recommending dietary to speak with pt about low k diet and repeating bmp in afternoon  -kidney and bladder sono small right kidney, no hydro b/l  -Secondary htn workup: renal arterial duplex neg, all serum w/up pending still  -once d/c needs Renal f/up in 2-3wks    Mild dilation of the ascending aorta, 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

## 2019-04-03 NOTE — PROGRESS NOTE ADULT - SUBJECTIVE AND OBJECTIVE BOX
84 y/o M with PMH COPD not on home O2, current smoker, HLD, presents for constant persistent RUE numbness/weakness x "3-4 or maybe 4-5"hrs which began while sitting down watching TV. Pt was Stroke Code, NIHSS 1, no tpa given. CT Head neg. Neuro following.    Labs:                      12.4   8.11  )-----------( 177      ( 2019 05:25 )             38.4       04-03    144  |  106  |  47<H>  ----------------------------<  98  5.2<H>   |  24  |  2.8<H>    Ca    8.4<L>      2019 05:25  Phos  2.7     04-02    TPro  6.2  /  Alb  3.7  /  TBili  <0.2  /  DBili  x   /  AST  22  /  ALT  10  /  AlkPhos  74  04-03      CAPILLARY BLOOD GLUCOSE    LIVER FUNCTIONS - ( 2019 05:25 )  Alb: 3.7 g/dL / Pro: 6.2 g/dL / ALK PHOS: 74 U/L / ALT: 10 U/L / AST: 22 U/L / GGT: x           Urinalysis Basic - ( 2019 13:50 )  Color: Yellow / Appearance: Clear / S.020 / pH: x  Gluc: x / Ketone: Negative  / Bili: Negative / Urobili: 0.2 mg/dL   Blood: x / Protein: 30 mg/dL / Nitrite: Negative   Leuk Esterase: Negative / RBC: 1-2 /HPF / WBC x   Sq Epi: x / Non Sq Epi: Few /HPF / Bacteria: Few    I&O's Summary    2019 07:01  -  2019 07:00  --------------------------------------------------------  IN: 0 mL / OUT: 1 mL / NET: -1 mL      Physical Exam  General: hearing impaired  Cardiac: RRR, S1S2  Lungs: +wheezing when coughing  Abd: NTND, +BS  LE: no swelling  Neuro: improved dexterity of RUE compared to yesterday  no new focal deficits noted  AAOx3

## 2019-04-03 NOTE — PROGRESS NOTE ADULT - SUBJECTIVE AND OBJECTIVE BOX
Neurology Follow up note    Name  JARED ROBLES    HPI:  82 y/o M with PMH COPD not on home O2, current smoker, HLD, presents for constant persistent RUE numbness/weakness x "3-4 or maybe 4-5"hrs which began while sitting down watching TV. unable to specify further time period. No blood thinner use. Denies any hx of strokes in the past. He completed a 20 hour drive from [place] the day ptp. NIHSS 1. Pt was also hypertensive on arrival to 258/127 HR 98. Pt was started on cardene drip, which he remains on this am. CT head neg. Pt was also found to have Cr 2.7, it was 2.3 in 4/2017, today it is downtrending to 2.5. (01 Apr 2019 08:10)      Interval History:        Vital Signs Last 24 Hrs  T(C): 35.8 (02 Apr 2019 23:04), Max: 35.8 (02 Apr 2019 23:04)  T(F): 96.4 (02 Apr 2019 23:04), Max: 96.4 (02 Apr 2019 23:04)  HR: 105 (02 Apr 2019 23:04) (85 - 115)  BP: 165/76 (02 Apr 2019 23:04) (144/78 - 165/76)  BP(mean): 109 (02 Apr 2019 23:04) (101 - 109)  RR: 20 (02 Apr 2019 15:00) (20 - 21)  SpO2: 96% (02 Apr 2019 10:53) (96% - 96%)    Neurological Exam:   Mental status: Awake, alert and oriented x3.  Recent and remote memory intact.  Naming, repetition and comprehension intact.  Attention/concentration intact.  No dysarthria, no aphasia.  Fund of knowledge appropriate.    Cranial nerves: Pupils equally round and reactive to light, visual fields full, no nystagmus, extraocular muscles intact, V1 through V3 intact bilaterally and symmetric, face symmetric, hearing intact to finger rub, palate elevation symmetric, tongue was midline.  Motor:  MRC grading 5/5 b/l UE/LE.   strength 5/5.  Normal tone and bulk.  No abnormal movements.    Sensation: Intact to light touch, proprioception, and pinprick.   Coordination: No dysmetria on finger-to-nose and heel-to-shin.  No dysdiadokinesia.  Reflexes: 2+ in bilateral UE/LE, downgoing toes bilaterally. (-) Oliveros.  Gait: Narrow and steady. No ataxia.  Romberg negative    Medications  ALBUTerol/ipratropium for Nebulization 3 milliLiter(s) Nebulizer every 6 hours PRN  aspirin 325 milliGRAM(s) Oral daily  atorvastatin 80 milliGRAM(s) Oral at bedtime  buDESOnide 160 MICROgram(s)/formoterol 4.5 MICROgram(s) Inhaler 2 Puff(s) Inhalation two times a day  doxazosin 2 milliGRAM(s) Oral at bedtime  guaiFENesin  milliGRAM(s) Oral every 12 hours  heparin  Injectable 5000 Unit(s) SubCutaneous every 8 hours  nicotine - 21 mG/24Hr(s) Patch 1 patch Transdermal daily  NIFEdipine XL 30 milliGRAM(s) Oral daily  pantoprazole    Tablet 40 milliGRAM(s) Oral before breakfast  predniSONE   Tablet 40 milliGRAM(s) Oral daily  tiotropium 18 MICROgram(s) Capsule 1 Capsule(s) Inhalation daily      Lab  04-02    144  |  109  |  35<H>  ----------------------------<  108<H>  4.7   |  23  |  2.4<H>    Ca    8.6      02 Apr 2019 05:44  Phos  2.7     04-02    TPro  6.3  /  Alb  3.9  /  TBili  0.2  /  DBili  x   /  AST  12  /  ALT  8   /  AlkPhos  88  04-02                          13.1   6.87  )-----------( 186      ( 02 Apr 2019 05:44 )             40.6     LIVER FUNCTIONS - ( 02 Apr 2019 05:44 )  Alb: 3.9 g/dL / Pro: 6.3 g/dL / ALK PHOS: 88 U/L / ALT: 8 U/L / AST: 12 U/L / GGT: x                   Radiology  carotid doppler:  20-39% b carotid stenosis       Assessment: 84 yo man with acute L F/P CVA      Plan:  cont asa and lipitor  PT/REHAB Neurology Follow up note    Name  JARED ROBLES    HPI:  84 y/o M with PMH COPD not on home O2, current smoker, HLD, presents for constant persistent RUE numbness/weakness x "3-4 or maybe 4-5"hrs which began while sitting down watching TV. unable to specify further time period. No blood thinner use. Denies any hx of strokes in the past. He completed a 20 hour drive from [place] the day ptp. NIHSS 1. Pt was also hypertensive on arrival to 258/127 HR 98. Pt was started on cardene drip, which he remains on this am. CT head neg. Pt was also found to have Cr 2.7, it was 2.3 in 4/2017, today it is downtrending to 2.5. (01 Apr 2019 08:10)      Interval History:    no new events.   patient is clinically improved      Vital Signs Last 24 Hrs  T(C): 35.8 (02 Apr 2019 23:04), Max: 35.8 (02 Apr 2019 23:04)  T(F): 96.4 (02 Apr 2019 23:04), Max: 96.4 (02 Apr 2019 23:04)  HR: 105 (02 Apr 2019 23:04) (85 - 115)  BP: 165/76 (02 Apr 2019 23:04) (144/78 - 165/76)  BP(mean): 109 (02 Apr 2019 23:04) (101 - 109)  RR: 20 (02 Apr 2019 15:00) (20 - 21)  SpO2: 96% (02 Apr 2019 10:53) (96% - 96%)    Neurological Exam:   Mental status: Awake, alert and oriented x3.  Recent and remote memory intact.  Naming, repetition and comprehension intact.  Attention/concentration intact.  No dysarthria, no aphasia.  Fund of knowledge appropriate.    Cranial nerves: Pupils equally round and reactive to light, visual fields full, no nystagmus, extraocular muscles intact, V1 through V3 intact bilaterally and symmetric, face symmetric, hearing intact to finger rub, palate elevation symmetric, tongue was midline.  Motor:  MRC grading 5/5 b/l UE/LE.   strength 5/5.  Normal tone and bulk.  No abnormal movements.    Sensation: Intact to light touch  Coordination: No dysmetria on finger-to-nose   Reflexes: 2+ in bilateral UE/LE, downgoing toes bilaterally. (-) Oliveros.  e    Medications  ALBUTerol/ipratropium for Nebulization 3 milliLiter(s) Nebulizer every 6 hours PRN  aspirin 325 milliGRAM(s) Oral daily  atorvastatin 80 milliGRAM(s) Oral at bedtime  buDESOnide 160 MICROgram(s)/formoterol 4.5 MICROgram(s) Inhaler 2 Puff(s) Inhalation two times a day  doxazosin 2 milliGRAM(s) Oral at bedtime  guaiFENesin  milliGRAM(s) Oral every 12 hours  heparin  Injectable 5000 Unit(s) SubCutaneous every 8 hours  nicotine - 21 mG/24Hr(s) Patch 1 patch Transdermal daily  NIFEdipine XL 30 milliGRAM(s) Oral daily  pantoprazole    Tablet 40 milliGRAM(s) Oral before breakfast  predniSONE   Tablet 40 milliGRAM(s) Oral daily  tiotropium 18 MICROgram(s) Capsule 1 Capsule(s) Inhalation daily      Lab  04-02    144  |  109  |  35<H>  ----------------------------<  108<H>  4.7   |  23  |  2.4<H>    Ca    8.6      02 Apr 2019 05:44  Phos  2.7     04-02    TPro  6.3  /  Alb  3.9  /  TBili  0.2  /  DBili  x   /  AST  12  /  ALT  8   /  AlkPhos  88  04-02                          13.1   6.87  )-----------( 186      ( 02 Apr 2019 05:44 )             40.6     LIVER FUNCTIONS - ( 02 Apr 2019 05:44 )  Alb: 3.9 g/dL / Pro: 6.3 g/dL / ALK PHOS: 88 U/L / ALT: 8 U/L / AST: 12 U/L / GGT: x                   Radiology  carotid doppler:  20-39% b carotid stenosis       Assessment: 84 yo man with acute L F/P CVA. patient not on asa at home. no carotid stenosis noted.      Plan:  cont asa 81 and lipitor 80  PT/REHAB  clear to downgrade from unit  please call with any questions

## 2019-04-03 NOTE — PROGRESS NOTE ADULT - ATTENDING COMMENTS
Patient seen and evaluated independently medical resident note reviewed, I agree with plan and management, except as I have noted.
Attending Statement: I have personally performed a face to face diagnostic evaluation on this patient. I have reviewed the above note and agree with the history, exam and plan of care, except as I have noted in the text.
Attending Statement: I have personally performed a face to face diagnostic evaluation on this patient. I have reviewed the above note and agree with the history, exam and plan of care, except as I have noted in the text.
pt w/o cp. follow bp on meds.

## 2019-04-03 NOTE — PROGRESS NOTE ADULT - ASSESSMENT
83/M with COPD, HTN, HLD, CKD presents with Rt arm numbness and HTN urgency.    Pt found to have advanced CKD with DMITRIY.    Mild hyperkalemia - due to dehydration and CKD  - obtain dietary consult to give pt the low K diet  - encourage oral hydration (Na 144 ) 2.5 L daily   - check bladder scan for PVR  - repeat BMP in the pm      HTN - better controlled on Procardia XL 30 mg qd and Doxazosin 2 mg qhs  - screen for target organ damage - LVH, CM  - UA for protein/creat - o.4 g/g - mild proteinuria noted  - screen for secondary HTN - Aldosterone/Renin ratio  - plasma cathecholamine and metanephrines  24 hr urine for catecholamines and metanephrines  - Renal Artery Duppelx neg    DMITRIY on CKD - baseline creat 2.3 in 2017, which may have progressed by now b/o uncontrolled HTN  - kidney and bladder sono small right kidny, no hydro b/l  - basic w/u for HTN  - SPEP/UPEP/SIF/UIF, Hep B and C profile, XENA C3c4  - check phos, iPTH    renal f/u in 2-3 weeks after d/c  D/W HS

## 2019-04-04 ENCOUNTER — TRANSCRIPTION ENCOUNTER (OUTPATIENT)
Age: 84
End: 2019-04-04

## 2019-04-04 VITALS — DIASTOLIC BLOOD PRESSURE: 69 MMHG | OXYGEN SATURATION: 100 % | SYSTOLIC BLOOD PRESSURE: 138 MMHG | HEART RATE: 86 BPM

## 2019-04-04 LAB
ALBUMIN SERPL ELPH-MCNC: 3.9 G/DL — SIGNIFICANT CHANGE UP (ref 3.5–5.2)
ALP SERPL-CCNC: 82 U/L — SIGNIFICANT CHANGE UP (ref 30–115)
ALT FLD-CCNC: 15 U/L — SIGNIFICANT CHANGE UP (ref 0–41)
ANION GAP SERPL CALC-SCNC: 13 MMOL/L — SIGNIFICANT CHANGE UP (ref 7–14)
ANION GAP SERPL CALC-SCNC: 16 MMOL/L — HIGH (ref 7–14)
AST SERPL-CCNC: 21 U/L — SIGNIFICANT CHANGE UP (ref 0–41)
BASOPHILS # BLD AUTO: 0.01 K/UL — SIGNIFICANT CHANGE UP (ref 0–0.2)
BASOPHILS NFR BLD AUTO: 0.1 % — SIGNIFICANT CHANGE UP (ref 0–1)
BILIRUB SERPL-MCNC: 0.2 MG/DL — SIGNIFICANT CHANGE UP (ref 0.2–1.2)
BUN SERPL-MCNC: 52 MG/DL — HIGH (ref 10–20)
BUN SERPL-MCNC: 54 MG/DL — HIGH (ref 10–20)
CALCIUM SERPL-MCNC: 8.3 MG/DL — LOW (ref 8.5–10.1)
CALCIUM SERPL-MCNC: 9 MG/DL — SIGNIFICANT CHANGE UP (ref 8.5–10.1)
CHLORIDE SERPL-SCNC: 104 MMOL/L — SIGNIFICANT CHANGE UP (ref 98–110)
CHLORIDE SERPL-SCNC: 106 MMOL/L — SIGNIFICANT CHANGE UP (ref 98–110)
CO2 SERPL-SCNC: 22 MMOL/L — SIGNIFICANT CHANGE UP (ref 17–32)
CO2 SERPL-SCNC: 23 MMOL/L — SIGNIFICANT CHANGE UP (ref 17–32)
CREAT SERPL-MCNC: 3.2 MG/DL — HIGH (ref 0.7–1.5)
CREAT SERPL-MCNC: 3.3 MG/DL — HIGH (ref 0.7–1.5)
EOSINOPHIL # BLD AUTO: 0 K/UL — SIGNIFICANT CHANGE UP (ref 0–0.7)
EOSINOPHIL NFR BLD AUTO: 0 % — SIGNIFICANT CHANGE UP (ref 0–8)
GLUCOSE SERPL-MCNC: 100 MG/DL — HIGH (ref 70–99)
GLUCOSE SERPL-MCNC: 123 MG/DL — HIGH (ref 70–99)
HCT VFR BLD CALC: 39.9 % — LOW (ref 42–52)
HGB BLD-MCNC: 13 G/DL — LOW (ref 14–18)
IMM GRANULOCYTES NFR BLD AUTO: 0.2 % — SIGNIFICANT CHANGE UP (ref 0.1–0.3)
LYMPHOCYTES # BLD AUTO: 1.09 K/UL — LOW (ref 1.2–3.4)
LYMPHOCYTES # BLD AUTO: 11 % — LOW (ref 20.5–51.1)
MAGNESIUM SERPL-MCNC: 2.1 MG/DL — SIGNIFICANT CHANGE UP (ref 1.8–2.4)
MCHC RBC-ENTMCNC: 31 PG — SIGNIFICANT CHANGE UP (ref 27–31)
MCHC RBC-ENTMCNC: 32.6 G/DL — SIGNIFICANT CHANGE UP (ref 32–37)
MCV RBC AUTO: 95.2 FL — HIGH (ref 80–94)
MONOCYTES # BLD AUTO: 0.99 K/UL — HIGH (ref 0.1–0.6)
MONOCYTES NFR BLD AUTO: 9.9 % — HIGH (ref 1.7–9.3)
NEUTROPHILS # BLD AUTO: 7.84 K/UL — HIGH (ref 1.4–6.5)
NEUTROPHILS NFR BLD AUTO: 78.8 % — HIGH (ref 42.2–75.2)
NRBC # BLD: 0 /100 WBCS — SIGNIFICANT CHANGE UP (ref 0–0)
PLATELET # BLD AUTO: 186 K/UL — SIGNIFICANT CHANGE UP (ref 130–400)
POTASSIUM SERPL-MCNC: 4.7 MMOL/L — SIGNIFICANT CHANGE UP (ref 3.5–5)
POTASSIUM SERPL-MCNC: 5.3 MMOL/L — HIGH (ref 3.5–5)
POTASSIUM SERPL-SCNC: 4.7 MMOL/L — SIGNIFICANT CHANGE UP (ref 3.5–5)
POTASSIUM SERPL-SCNC: 5.3 MMOL/L — HIGH (ref 3.5–5)
PROT SERPL-MCNC: 6.5 G/DL — SIGNIFICANT CHANGE UP (ref 6–8)
RBC # BLD: 4.19 M/UL — LOW (ref 4.7–6.1)
RBC # FLD: 14 % — SIGNIFICANT CHANGE UP (ref 11.5–14.5)
SODIUM SERPL-SCNC: 142 MMOL/L — SIGNIFICANT CHANGE UP (ref 135–146)
SODIUM SERPL-SCNC: 142 MMOL/L — SIGNIFICANT CHANGE UP (ref 135–146)
WBC # BLD: 9.95 K/UL — SIGNIFICANT CHANGE UP (ref 4.8–10.8)
WBC # FLD AUTO: 9.95 K/UL — SIGNIFICANT CHANGE UP (ref 4.8–10.8)

## 2019-04-04 RX ORDER — NIFEDIPINE 30 MG
1 TABLET, EXTENDED RELEASE 24 HR ORAL
Qty: 0 | Refills: 0 | COMMUNITY
Start: 2019-04-04

## 2019-04-04 RX ORDER — ATORVASTATIN CALCIUM 80 MG/1
1 TABLET, FILM COATED ORAL
Qty: 30 | Refills: 1 | OUTPATIENT
Start: 2019-04-04

## 2019-04-04 RX ORDER — ASPIRIN/CALCIUM CARB/MAGNESIUM 324 MG
1 TABLET ORAL
Qty: 30 | Refills: 1 | OUTPATIENT
Start: 2019-04-04

## 2019-04-04 RX ORDER — TAMSULOSIN HYDROCHLORIDE 0.4 MG/1
1 CAPSULE ORAL
Qty: 30 | Refills: 1 | OUTPATIENT
Start: 2019-04-04 | End: 2019-06-02

## 2019-04-04 RX ORDER — ATORVASTATIN CALCIUM 80 MG/1
1 TABLET, FILM COATED ORAL
Qty: 0 | Refills: 0 | COMMUNITY
Start: 2019-04-04

## 2019-04-04 RX ORDER — ALBUTEROL 90 UG/1
2 AEROSOL, METERED ORAL
Qty: 1 | Refills: 2 | OUTPATIENT
Start: 2019-04-04 | End: 2019-07-02

## 2019-04-04 RX ORDER — ALBUTEROL 90 UG/1
2 AEROSOL, METERED ORAL
Qty: 0 | Refills: 0 | COMMUNITY

## 2019-04-04 RX ORDER — ASPIRIN/CALCIUM CARB/MAGNESIUM 324 MG
1 TABLET ORAL
Qty: 0 | Refills: 0 | COMMUNITY
Start: 2019-04-04

## 2019-04-04 RX ORDER — TAMSULOSIN HYDROCHLORIDE 0.4 MG/1
0.4 CAPSULE ORAL AT BEDTIME
Qty: 0 | Refills: 0 | Status: DISCONTINUED | OUTPATIENT
Start: 2019-04-04 | End: 2019-04-04

## 2019-04-04 RX ORDER — TAMSULOSIN HYDROCHLORIDE 0.4 MG/1
1 CAPSULE ORAL
Qty: 0 | Refills: 0 | COMMUNITY
Start: 2019-04-04

## 2019-04-04 RX ORDER — TIOTROPIUM BROMIDE 18 UG/1
1 CAPSULE ORAL; RESPIRATORY (INHALATION)
Qty: 0 | Refills: 0 | COMMUNITY
Start: 2019-04-04

## 2019-04-04 RX ORDER — BUDESONIDE AND FORMOTEROL FUMARATE DIHYDRATE 160; 4.5 UG/1; UG/1
2 AEROSOL RESPIRATORY (INHALATION)
Qty: 1 | Refills: 2 | OUTPATIENT
Start: 2019-04-04 | End: 2019-07-02

## 2019-04-04 RX ORDER — TIOTROPIUM BROMIDE 18 UG/1
1 CAPSULE ORAL; RESPIRATORY (INHALATION)
Qty: 1 | Refills: 2 | OUTPATIENT
Start: 2019-04-04

## 2019-04-04 RX ORDER — NIFEDIPINE 30 MG
1 TABLET, EXTENDED RELEASE 24 HR ORAL
Qty: 30 | Refills: 1 | OUTPATIENT
Start: 2019-04-04

## 2019-04-04 RX ORDER — NIFEDIPINE 30 MG
60 TABLET, EXTENDED RELEASE 24 HR ORAL DAILY
Qty: 0 | Refills: 0 | Status: DISCONTINUED | OUTPATIENT
Start: 2019-04-04 | End: 2019-04-04

## 2019-04-04 RX ORDER — ASPIRIN/CALCIUM CARB/MAGNESIUM 324 MG
1 TABLET ORAL
Qty: 0 | Refills: 0 | COMMUNITY

## 2019-04-04 RX ORDER — ATORVASTATIN CALCIUM 80 MG/1
1 TABLET, FILM COATED ORAL
Qty: 0 | Refills: 0 | COMMUNITY

## 2019-04-04 RX ORDER — BUDESONIDE AND FORMOTEROL FUMARATE DIHYDRATE 160; 4.5 UG/1; UG/1
0 AEROSOL RESPIRATORY (INHALATION)
Qty: 0 | Refills: 0 | COMMUNITY
Start: 2019-04-04

## 2019-04-04 RX ADMIN — HEPARIN SODIUM 5000 UNIT(S): 5000 INJECTION INTRAVENOUS; SUBCUTANEOUS at 05:58

## 2019-04-04 RX ADMIN — Medication 40 MILLIGRAM(S): at 05:57

## 2019-04-04 RX ADMIN — Medication 325 MILLIGRAM(S): at 12:09

## 2019-04-04 RX ADMIN — BUDESONIDE AND FORMOTEROL FUMARATE DIHYDRATE 2 PUFF(S): 160; 4.5 AEROSOL RESPIRATORY (INHALATION) at 08:55

## 2019-04-04 RX ADMIN — Medication 60 MILLIGRAM(S): at 12:03

## 2019-04-04 RX ADMIN — TIOTROPIUM BROMIDE 1 CAPSULE(S): 18 CAPSULE ORAL; RESPIRATORY (INHALATION) at 08:55

## 2019-04-04 RX ADMIN — Medication 1 PATCH: at 12:10

## 2019-04-04 RX ADMIN — Medication 30 MILLIGRAM(S): at 05:58

## 2019-04-04 RX ADMIN — PANTOPRAZOLE SODIUM 40 MILLIGRAM(S): 20 TABLET, DELAYED RELEASE ORAL at 05:57

## 2019-04-04 RX ADMIN — Medication 1 PATCH: at 12:06

## 2019-04-04 NOTE — PROGRESS NOTE ADULT - SUBJECTIVE AND OBJECTIVE BOX
84 y/o M with PMH COPD not on home O2, current smoker, HLD, presents for constant persistent RUE numbness/weakness x "3-4 or maybe 4-5"hrs which began while sitting down watching TV. Pt was Stroke Code, NIHSS 1, no tpa given. CT Head neg. MRI shows L fronto-parietal stroke.    Labs:                 13.0   9.95  )-----------( 186      ( 04 Apr 2019 05:33 )             39.9       04-04    142  |  106  |  52<H>  ----------------------------<  100<H>  4.7   |  23  |  3.2<H>    Ca    8.3<L>      04 Apr 2019 05:33  Mg     2.1     04-03    TPro  6.5  /  Alb  3.9  /  TBili  0.2  /  DBili  x   /  AST  21  /  ALT  15  /  AlkPhos  82  04-04      CAPILLARY BLOOD GLUCOSE  POCT Blood Glucose.: 87 mg/dL (03 Apr 2019 21:58)    LIVER FUNCTIONS - ( 04 Apr 2019 05:33 )  Alb: 3.9 g/dL / Pro: 6.5 g/dL / ALK PHOS: 82 U/L / ALT: 15 U/L / AST: 21 U/L / GGT: x             I&O's Summary    03 Apr 2019 07:01  -  04 Apr 2019 07:00  --------------------------------------------------------  IN: 0 mL / OUT: 450 mL / NET: -450 mL      Physical Exam  General: hearing impaired  Cardiac: RRR, S1S2  Lungs: +wheezing when coughing  Abd: NTND, +BS  LE: no swelling  Neuro: improved dexterity of RUE, pt states now it is "beautiful"  no new focal deficits noted  AAOx3

## 2019-04-04 NOTE — PROGRESS NOTE ADULT - REASON FOR ADMISSION
R hand weakness

## 2019-04-04 NOTE — DISCHARGE NOTE NURSING/CASE MANAGEMENT/SOCIAL WORK - NSDCDPATPORTLINK_GEN_ALL_CORE
You can access the [facility] Patient Portal, offered by [facility], by registering with the following website: [email or web address]

## 2019-04-04 NOTE — DISCHARGE NOTE PROVIDER - CARE PROVIDERS DIRECT ADDRESSES
,DirectAddress_Unknown,DirectAddress_Unknown,DirectAddress_Unknown ,DirectAddress_Unknown,[email or web address]

## 2019-04-04 NOTE — PROGRESS NOTE ADULT - PROVIDER SPECIALTY LIST ADULT
Cardiology
Critical Care
Critical Care
Hospitalist
Internal Medicine
Nephrology
Nephrology
Neurology
Neurology
Critical Care
Nephrology

## 2019-04-04 NOTE — DISCHARGE NOTE NURSING/CASE MANAGEMENT/SOCIAL WORK - NSDCPEPTSTRK_GEN_ALL_CORE
Need for follow up after discharge/Prescribed medications/Risk factors for stroke/Stroke warning signs and symptoms/Signs and symptoms of stroke/Call 911 for stroke/Stroke education booklet/Stroke support groups for patients, families, and friends

## 2019-04-04 NOTE — PROGRESS NOTE ADULT - SUBJECTIVE AND OBJECTIVE BOX
Nephrology progress note    Patient is seen and examined, events over the last 24 h noted .  Received KAyxalate 30 gr x1 yesterday. Wants to go home. PVR ~330 cc yesterday.  Allergies:  No Known Allergies    Hospital Medications:   MEDICATIONS  (STANDING):  aspirin 325 milliGRAM(s) Oral daily  atorvastatin 80 milliGRAM(s) Oral at bedtime  buDESOnide 160 MICROgram(s)/formoterol 4.5 MICROgram(s) Inhaler 2 Puff(s) Inhalation two times a day  doxazosin 2 milliGRAM(s) Oral at bedtime  heparin  Injectable 5000 Unit(s) SubCutaneous every 8 hours  nicotine - 21 mG/24Hr(s) Patch 1 patch Transdermal daily  NIFEdipine XL 30 milliGRAM(s) Oral daily  pantoprazole    Tablet 40 milliGRAM(s) Oral before breakfast  predniSONE   Tablet 40 milliGRAM(s) Oral daily  tiotropium 18 MICROgram(s) Capsule 1 Capsule(s) Inhalation daily        VITALS:  T(F): 96.5 (19 @ 07:11), Max: 96.5 (19 @ 07:11)  HR: 92 (19 @ 00:59)  BP: 169/81 (19 @ 00:59)  RR: 19 (19 @ 07:11)  SpO2: 92% (19 @ 23:16)  Wt(kg): --     @ 07:01  -   @ 07:00  --------------------------------------------------------  IN: 0 mL / OUT: 1 mL / NET: -1 mL     @ 07:01  -   @ 07:00  --------------------------------------------------------  IN: 0 mL / OUT: 450 mL / NET: -450 mL          PHYSICAL EXAM:  Constitutional: NAD  HEENT: anicteric sclera, moist oral mucosa  Neck: No JVD  Respiratory: CTAB, no wheezes, rales or rhonchi  Cardiovascular: S1, S2, RRR  Gastrointestinal: BS+, soft, NT/ND  Extremities:  No peripheral edema  Neurological: A/O x 3  : No CVA tenderness. No hernández.   Skin: No rashes  Vascular Access:    LABS:      142  |  106  |  52<H>  ----------------------------<  100<H>  4.7   |  23  |  3.2<H>    Ca    8.3<L>      2019 05:33  Mg     2.1         TPro  6.5  /  Alb  3.9  /  TBili  0.2  /  DBili      /  AST  21  /  ALT  15  /  AlkPhos  82                            13.0   9.95  )-----------( 186      ( 2019 05:33 )             39.9       Urine Studies:  Urinalysis Basic - ( 2019 13:50 )    Color: Yellow / Appearance: Clear / S.020 / pH:   Gluc:  / Ketone: Negative  / Bili: Negative / Urobili: 0.2 mg/dL   Blood:  / Protein: 30 mg/dL / Nitrite: Negative   Leuk Esterase: Negative / RBC: 1-2 /HPF / WBC    Sq Epi:  / Non Sq Epi: Few /HPF / Bacteria: Few      Sodium, Random Urine: 134.0 mmoL/L ( @ 13:50)  Creatinine, Random Urine: 62 mg/dL ( @ 13:50)  Osmolality, Random Urine: 464 mos/kg ( @ 13:50)  Protein/Creatinine Ratio Calculation: 0.4 Ratio ( @ 13:50)  Creatinine, Random Urine: 61 mg/dL ( @ 13:50)    RADIOLOGY & ADDITIONAL STUDIES:

## 2019-04-04 NOTE — PROGRESS NOTE ADULT - ASSESSMENT
RUE Weakness likely 2/2 Stroke  -CT head neg, MRI shows small acute L fronto-parietal infarct  -on asa 325, statin  -as per Dr Ramos, pt can be d/c home    HTN  -c/w nifedepine and doxazosin, both of which was started on this admission    COPD, active smoker  -educated to stop smoking  -c/w nicotine patch if needed  -started symbicort, spiriva  -stop prednisone after 5 days on 4/5/2018  -CT Chest shows Biapical scarring which appears nodular on the right  -repeat ct chest in 3 months in 7-8/2019 as outpt    DMITRIY on CKD vs Progression of CKD, could be 2/2 long standing HTN  -Cr uptrending with hyperkalemia, bladder scan check yest showed no retention  -pending   -kidney and bladder sono small right kidney, no hydro b/l  -Secondary htn workup: renal arterial duplex neg, all serum w/up pending still  -once d/c needs Renal f/up in 2-3wks    Mild dilation of the ascending aorta, 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 RUE Weakness likely 2/2 Stroke  -CT head neg, MRI shows small acute L fronto-parietal infarct  -on asa 325, statin  -as per Dr Ramos, pt can be d/c home    HTN  -c/w nifedepine and doxazosin, both of which was started on this admission    COPD, active smoker  -educated to stop smoking  -c/w nicotine patch if needed  -started symbicort, spiriva  -stop prednisone after 5 days on 4/5/2018  -CT Chest shows Biapical scarring which appears nodular on the right  -repeat ct chest in 3 months in 7-8/2019 as outpt    DMITRIY on CKD vs Progression of CKD, could be 2/2 long standing HTN  -Cr uptrending with hyperkalemia, bladder scan check yest showed no retention  -pt placed on low K diet  -kidney and bladder sono small right kidney, no hydro b/l  -Secondary htn workup: renal arterial duplex neg, all serum w/up pending still  -once d/c needs Renal f/up in 2-3wks    Mild dilation of the ascending aorta, 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

## 2019-04-04 NOTE — DISCHARGE NOTE PROVIDER - CARE PROVIDER_API CALL
Estrella Delgado)  Internal Medicine  [address] [place] [address]  Phone: [phone]  Fax: [phone]  Follow Up Time:     Joce Mazariegos)  Cardiovascular Disease; Interventional Cardiology  [address] [place] [address]  Phone: [phone]  Fax: [phone]  Follow Up Time:     Fatemeh Woods)  Nephrology  [facility] [place] [address]  Phone: [phone]  Fax: [phone]  Follow Up Time: Fatemeh Woods)  Nephrology  [address] [place] [address]  Phone: [phone]  Fax: [phone]  Follow Up Time:     Suleiman Dos Santos)  Geriatric Medicine; Internal Medicine  [address] [place] [address]  Phone: [phone]  Fax: [phone]  Follow Up Time:

## 2019-04-04 NOTE — PROGRESS NOTE ADULT - ASSESSMENT
83/M with COPD, HTN, HLD, CKD presents with Rt arm numbness and HTN urgency.    Pt found to have advanced CKD with DMITRIY.      DMITRIY on CKD - creat is trending down, baseline now is probably around 3.0 (it was 2.3 in 2017)   - kidney and bladder sono small right kidney, no hydro b/l  - SPEP normal, ANCA Hep C neg    Hyperkalemia - received Kayxalate  - dietary consult to give pt the low K diet - pending  - encourage oral hydration 2.5 L daily   - bladder scan for PVR showed ~330 cc yesterday  - give Kayxalate 30 grams po once a week    HTN - better controlled   - in view of urinary retention - d/c Doxazosin, start Flomax 0.4 mg qhs  -increase Procardia XL to 60 mg qd  - UA for protein/creat - o.4 g/g - mild proteinuria noted  - screen for secondary HTN - Aldosterone/Renin ratio  - plasma cathecholamine and metanephrines  24 hr urine for catecholamines and metanephrines  - Renal Artery Duppelx neg    renal f/u in 2 weeks after d/c  D/W HS

## 2019-04-04 NOTE — DISCHARGE NOTE PROVIDER - PROVIDER TOKENS
PROVIDER:[TOKEN:[[id number]]],PROVIDER:[TOKEN:[[id number]]],PROVIDER:[TOKEN:[[id number]]] PROVIDER:[TOKEN:[[id number]]],PROVIDER:[TOKEN:[[id number]]]

## 2019-04-04 NOTE — DISCHARGE NOTE PROVIDER - NSDCCPCAREPLAN_GEN_ALL_CORE_FT
PRINCIPAL DISCHARGE DIAGNOSIS  Diagnosis: Stroke  Assessment and Plan of Treatment:       SECONDARY DISCHARGE DIAGNOSES  Diagnosis: DMITRIY (acute kidney injury)  Assessment and Plan of Treatment:     Diagnosis: Hypertensive emergency  Assessment and Plan of Treatment:

## 2019-04-04 NOTE — DISCHARGE NOTE PROVIDER - HOSPITAL COURSE
84 y/o M with PMH COPD not on home O2, current smoker, HLD, presents for constant persistent RUE numbness/weakness x "3-4 or maybe 4-5"hrs which began while sitting down watching TV. Pt was Stroke Code, NIHSS 1, no tpa given. CT Head neg. MRI shows L fronto-parietal stroke. He was started on blood pressure medication. Pt was encouraged to stop smoking and he was started on triple therapy for inhalers. CT Chest showed biapical scarring which appears nodular on the right, repeat ct chest in 3 months in 7-8/2019 as outpatient. Renal saw him for DMITRIY on CKD, he had a couple episodes of hyperkalemia, he has been educated to avoid potassium. He needs to follow up with the kidney doctor in 2-3 weeks-Dr Woods.

## 2019-04-04 NOTE — DISCHARGE NOTE PROVIDER - INSTRUCTIONS
encourage oral hydration of 2.5L per day  avoid potassium in your daily food, such as oranges, banana, potatoes, spinach

## 2019-04-05 LAB
CREAT ?TM UR-MCNC: 58 MG/DL — SIGNIFICANT CHANGE UP (ref 20–320)
DOPAMINE UR-MCNC: 221 MCG/G CR — SIGNIFICANT CHANGE UP (ref 40–390)
EPINEPH UR-MCNC: 6 MCG/G CR — SIGNIFICANT CHANGE UP (ref 2–16)
EPINEPHRINE/NOREPINEPHRINE - RANDOM URINE: 81 MCG/G CR — HIGH (ref 9–74)
METANEPHRINE, PL: 26 PG/ML — SIGNIFICANT CHANGE UP (ref 0–62)
NOREPINEPH 24H UR-MCNC: 75 MCG/G CR — HIGH (ref 7–65)
NORMETANEPHRINE, PL: 470 PG/ML — HIGH (ref 0–145)

## 2019-04-06 LAB
% GAMMA, URINE: 12 % — SIGNIFICANT CHANGE UP
ALBUMIN 24H MFR UR ELPH: 25.4 % — SIGNIFICANT CHANGE UP
ALPHA1 GLOB 24H MFR UR ELPH: 33.6 % — SIGNIFICANT CHANGE UP
ALPHA2 GLOB 24H MFR UR ELPH: 14.1 % — SIGNIFICANT CHANGE UP
B-GLOBULIN 24H MFR UR ELPH: 14.9 % — SIGNIFICANT CHANGE UP
INTERPRETATION 24H UR IFE-IMP: SIGNIFICANT CHANGE UP
INTERPRETATION 24H UR IFE-IMP: SIGNIFICANT CHANGE UP
M PROTEIN 24H UR ELPH-MRATE: SIGNIFICANT CHANGE UP
PROT ?TM UR-MCNC: 20 MG/DL — HIGH (ref 0–12)
PROT PATTERN 24H UR ELPH-IMP: SIGNIFICANT CHANGE UP
TOTAL VOLUME - 24 HOUR: SIGNIFICANT CHANGE UP ML
URINE CREATININE CALCULATION: SIGNIFICANT CHANGE UP G/24 H (ref 1–2)

## 2019-04-09 LAB — RENIN PLAS-CCNC: 0.35 NG/ML/HR — SIGNIFICANT CHANGE UP (ref 0.17–5.38)

## 2019-04-10 ENCOUNTER — OUTPATIENT (OUTPATIENT)
Dept: OUTPATIENT SERVICES | Facility: HOSPITAL | Age: 84
LOS: 1 days | Discharge: HOME | End: 2019-04-10

## 2019-04-10 DIAGNOSIS — I63.89 OTHER CEREBRAL INFARCTION: ICD-10-CM

## 2019-04-10 DIAGNOSIS — G83.21 MONOPLEGIA OF UPPER LIMB AFFECTING RIGHT DOMINANT SIDE: ICD-10-CM

## 2019-04-10 DIAGNOSIS — R29.898 OTHER SYMPTOMS AND SIGNS INVOLVING THE MUSCULOSKELETAL SYSTEM: ICD-10-CM

## 2019-04-10 DIAGNOSIS — N18.2 CHRONIC KIDNEY DISEASE, STAGE 2 (MILD): ICD-10-CM

## 2019-04-10 DIAGNOSIS — R29.701 NIHSS SCORE 1: ICD-10-CM

## 2019-04-10 DIAGNOSIS — F17.210 NICOTINE DEPENDENCE, CIGARETTES, UNCOMPLICATED: ICD-10-CM

## 2019-04-10 DIAGNOSIS — J44.9 CHRONIC OBSTRUCTIVE PULMONARY DISEASE, UNSPECIFIED: ICD-10-CM

## 2019-04-10 DIAGNOSIS — D64.9 ANEMIA, UNSPECIFIED: ICD-10-CM

## 2019-04-10 DIAGNOSIS — I12.9 HYPERTENSIVE CHRONIC KIDNEY DISEASE WITH STAGE 1 THROUGH STAGE 4 CHRONIC KIDNEY DISEASE, OR UNSPECIFIED CHRONIC KIDNEY DISEASE: ICD-10-CM

## 2019-04-10 DIAGNOSIS — I16.1 HYPERTENSIVE EMERGENCY: ICD-10-CM

## 2019-04-10 DIAGNOSIS — E78.00 PURE HYPERCHOLESTEROLEMIA, UNSPECIFIED: ICD-10-CM

## 2019-04-10 DIAGNOSIS — R80.9 PROTEINURIA, UNSPECIFIED: ICD-10-CM

## 2019-04-10 DIAGNOSIS — H91.90 UNSPECIFIED HEARING LOSS, UNSPECIFIED EAR: ICD-10-CM

## 2019-04-10 DIAGNOSIS — N17.9 ACUTE KIDNEY FAILURE, UNSPECIFIED: ICD-10-CM

## 2019-04-10 DIAGNOSIS — E78.5 HYPERLIPIDEMIA, UNSPECIFIED: ICD-10-CM

## 2019-04-10 DIAGNOSIS — E87.5 HYPERKALEMIA: ICD-10-CM

## 2019-04-10 DIAGNOSIS — Z96.1 PRESENCE OF INTRAOCULAR LENS: Chronic | ICD-10-CM

## 2019-04-19 ENCOUNTER — OUTPATIENT (OUTPATIENT)
Dept: OUTPATIENT SERVICES | Facility: HOSPITAL | Age: 84
LOS: 1 days | Discharge: HOME | End: 2019-04-19

## 2019-04-19 DIAGNOSIS — N40.0 BENIGN PROSTATIC HYPERPLASIA WITHOUT LOWER URINARY TRACT SYMPTOMS: ICD-10-CM

## 2019-04-19 DIAGNOSIS — J44.9 CHRONIC OBSTRUCTIVE PULMONARY DISEASE, UNSPECIFIED: ICD-10-CM

## 2019-04-19 DIAGNOSIS — Z96.1 PRESENCE OF INTRAOCULAR LENS: Chronic | ICD-10-CM

## 2019-04-19 DIAGNOSIS — I10 ESSENTIAL (PRIMARY) HYPERTENSION: ICD-10-CM

## 2019-04-19 DIAGNOSIS — N18.3 CHRONIC KIDNEY DISEASE, STAGE 3 (MODERATE): ICD-10-CM

## 2019-04-19 DIAGNOSIS — I63.9 CEREBRAL INFARCTION, UNSPECIFIED: ICD-10-CM

## 2019-05-16 ENCOUNTER — OUTPATIENT (OUTPATIENT)
Dept: OUTPATIENT SERVICES | Facility: HOSPITAL | Age: 84
LOS: 1 days | Discharge: HOME | End: 2019-05-16

## 2019-05-16 DIAGNOSIS — N17.9 ACUTE KIDNEY FAILURE, UNSPECIFIED: ICD-10-CM

## 2019-05-16 DIAGNOSIS — D64.9 ANEMIA, UNSPECIFIED: ICD-10-CM

## 2019-05-16 DIAGNOSIS — Z02.9 ENCOUNTER FOR ADMINISTRATIVE EXAMINATIONS, UNSPECIFIED: ICD-10-CM

## 2019-05-16 DIAGNOSIS — R80.9 PROTEINURIA, UNSPECIFIED: ICD-10-CM

## 2019-05-16 DIAGNOSIS — Z96.1 PRESENCE OF INTRAOCULAR LENS: Chronic | ICD-10-CM

## 2019-05-16 DIAGNOSIS — E78.5 HYPERLIPIDEMIA, UNSPECIFIED: ICD-10-CM

## 2019-10-25 ENCOUNTER — OUTPATIENT (OUTPATIENT)
Dept: OUTPATIENT SERVICES | Facility: HOSPITAL | Age: 84
LOS: 1 days | Discharge: HOME | End: 2019-10-25

## 2019-10-25 DIAGNOSIS — N40.0 BENIGN PROSTATIC HYPERPLASIA WITHOUT LOWER URINARY TRACT SYMPTOMS: ICD-10-CM

## 2019-10-25 DIAGNOSIS — N18.3 CHRONIC KIDNEY DISEASE, STAGE 3 (MODERATE): ICD-10-CM

## 2019-10-25 DIAGNOSIS — Z96.1 PRESENCE OF INTRAOCULAR LENS: Chronic | ICD-10-CM

## 2019-10-25 DIAGNOSIS — J44.9 CHRONIC OBSTRUCTIVE PULMONARY DISEASE, UNSPECIFIED: ICD-10-CM

## 2019-10-25 DIAGNOSIS — I10 ESSENTIAL (PRIMARY) HYPERTENSION: ICD-10-CM

## 2019-10-25 DIAGNOSIS — I73.9 PERIPHERAL VASCULAR DISEASE, UNSPECIFIED: ICD-10-CM

## 2019-11-06 ENCOUNTER — OUTPATIENT (OUTPATIENT)
Dept: OUTPATIENT SERVICES | Facility: HOSPITAL | Age: 84
LOS: 1 days | Discharge: HOME | End: 2019-11-06
Payer: MEDICARE

## 2019-11-06 DIAGNOSIS — R91.1 SOLITARY PULMONARY NODULE: ICD-10-CM

## 2019-11-06 DIAGNOSIS — Z96.1 PRESENCE OF INTRAOCULAR LENS: Chronic | ICD-10-CM

## 2019-11-06 PROCEDURE — 71250 CT THORAX DX C-: CPT | Mod: 26

## 2020-08-12 NOTE — ED PROVIDER NOTE - NS ED MD DISPO SPECIAL CONSIDERATION1
----- Message from Darling Eduardo sent at 8/12/2020  4:23 PM CDT -----  Regarding: f/u  Type:  Sooner Apoointment Request    Caller is requesting a sooner appointment.  Caller declined first available appointment listed below.  Caller will not accept being placed on the waitlist and is requesting a message be sent to doctor.  Name of Caller Sisiter  When is the first available appointment?n/a  Symptoms:hospital f/u  Would the patient rather a call back or a response via [facility]? Call back  Best Call Back Number:[phone]  Additional Information: Please call back.Thanks       None

## 2021-02-01 ENCOUNTER — OUTPATIENT (OUTPATIENT)
Dept: OUTPATIENT SERVICES | Facility: HOSPITAL | Age: 86
LOS: 1 days | Discharge: HOME | End: 2021-02-01
Payer: MEDICARE

## 2021-02-01 DIAGNOSIS — Z96.1 PRESENCE OF INTRAOCULAR LENS: Chronic | ICD-10-CM

## 2021-02-01 DIAGNOSIS — N18.4 CHRONIC KIDNEY DISEASE, STAGE 4 (SEVERE): ICD-10-CM

## 2021-02-01 DIAGNOSIS — R35.0 FREQUENCY OF MICTURITION: ICD-10-CM

## 2021-02-01 PROCEDURE — 76770 US EXAM ABDO BACK WALL COMP: CPT | Mod: 26

## 2021-05-19 NOTE — ED PROVIDER NOTE - ATTENDING CONTRIBUTION TO CARE
82 yo M h/o COPD, high cholesterol (states takes a pill for cholesterol but forgets sometimes) presents with friends with c/o weakness to right arm.  Pt states he was laying down and thought that his arm fell asleep.  He thinks that it was 3-4 hrs ago.  States that he then realized that he had trouble holding on to things and could not tie his shoe.  He called his friends to bring him to ED, no speech or gait difficulty.  Pt drove himself from [place] today.  no CP, no SOB, On exam pt in NAD AAO x 3, speech is clear and fluent, face is symmetric, PERRL, + right sided pronator drift with weakness to RUE, abd soft , no edema
PAST MEDICAL HISTORY:  Anemia     Arthritis     Hypertension     Rheumatoid arthritis

## 2021-06-04 ENCOUNTER — OUTPATIENT (OUTPATIENT)
Dept: OUTPATIENT SERVICES | Facility: HOSPITAL | Age: 86
LOS: 1 days | Discharge: HOME | End: 2021-06-04
Payer: MEDICARE

## 2021-06-04 DIAGNOSIS — Z96.1 PRESENCE OF INTRAOCULAR LENS: Chronic | ICD-10-CM

## 2021-06-04 DIAGNOSIS — N18.4 CHRONIC KIDNEY DISEASE, STAGE 4 (SEVERE): ICD-10-CM

## 2021-06-04 PROCEDURE — 76770 US EXAM ABDO BACK WALL COMP: CPT | Mod: 26

## 2021-11-04 PROBLEM — Z00.00 ENCOUNTER FOR PREVENTIVE HEALTH EXAMINATION: Status: ACTIVE | Noted: 2021-11-04

## 2021-12-07 NOTE — PRE-ANESTHESIA EVALUATION ADULT - NSPROPOSEDPROCEDFT_GEN_ALL_CORE
[FreeTextEntry1] : Dear Dr. Theo Resendez, \par \par I had the pleasure of seeing your patient, SATINDER LINK, in my office today. Please see my note below. \par \par Thank you very much for allowing me to participate in the care of this patient. If you have any questions, please do not hesitate to contact me. \par \par \par Respectfully,\par \par Reinaldo Trevino M.D., FACS cataract extraction with IOL implant to the right eye .

## 2021-12-22 ENCOUNTER — APPOINTMENT (OUTPATIENT)
Dept: UROLOGY | Facility: CLINIC | Age: 86
End: 2021-12-22
Payer: MEDICARE

## 2021-12-22 VITALS — WEIGHT: 122 LBS | BODY MASS INDEX: 21.62 KG/M2 | HEIGHT: 63 IN

## 2021-12-22 DIAGNOSIS — R39.9 UNSPECIFIED SYMPTOMS AND SIGNS INVOLVING THE GENITOURINARY SYSTEM: ICD-10-CM

## 2021-12-22 DIAGNOSIS — N40.1 BENIGN PROSTATIC HYPERPLASIA WITH LOWER URINARY TRACT SYMPMS: ICD-10-CM

## 2021-12-22 DIAGNOSIS — N13.8 BENIGN PROSTATIC HYPERPLASIA WITH LOWER URINARY TRACT SYMPMS: ICD-10-CM

## 2021-12-22 DIAGNOSIS — F17.210 NICOTINE DEPENDENCE, CIGARETTES, UNCOMPLICATED: ICD-10-CM

## 2021-12-22 DIAGNOSIS — Z63.5 DISRUPTION OF FAMILY BY SEPARATION AND DIVORCE: ICD-10-CM

## 2021-12-22 LAB
BILIRUB UR QL STRIP: NORMAL
CLARITY UR: CLEAR
COLLECTION METHOD: NORMAL
GLUCOSE UR-MCNC: NORMAL
HCG UR QL: 0.2 EU/DL
HGB UR QL STRIP.AUTO: NORMAL
KETONES UR-MCNC: NORMAL
LEUKOCYTE ESTERASE UR QL STRIP: NORMAL
NITRITE UR QL STRIP: NORMAL
PH UR STRIP: 6
PROT UR STRIP-MCNC: NORMAL
SP GR UR STRIP: 1.02

## 2021-12-22 PROCEDURE — 81003 URINALYSIS AUTO W/O SCOPE: CPT | Mod: QW

## 2021-12-22 PROCEDURE — 99203 OFFICE O/P NEW LOW 30 MIN: CPT

## 2021-12-22 RX ORDER — TAMSULOSIN HYDROCHLORIDE 0.4 MG/1
CAPSULE ORAL
Refills: 0 | Status: ACTIVE | COMMUNITY

## 2021-12-22 RX ORDER — NIFEDIPINE 20 MG/1
CAPSULE ORAL
Refills: 0 | Status: ACTIVE | COMMUNITY

## 2021-12-22 RX ORDER — ATORVASTATIN CALCIUM 80 MG/1
TABLET, FILM COATED ORAL
Refills: 0 | Status: ACTIVE | COMMUNITY

## 2021-12-22 SDOH — SOCIAL STABILITY - SOCIAL INSECURITY: DISRUPTION OF FAMILY BY SEPARATION AND DIVORCE: Z63.5

## 2021-12-31 NOTE — DISCHARGE NOTE NURSING/CASE MANAGEMENT/SOCIAL WORK - FLU SEASON?
Slightly Thick Liquids (Pediatrics Only) Yes... Soft and Bite-Sized Diet/Pureed Diet/Slightly Thick Liquids (Pediatrics Only)

## 2022-04-27 ENCOUNTER — OUTPATIENT (OUTPATIENT)
Dept: OUTPATIENT SERVICES | Facility: HOSPITAL | Age: 87
LOS: 1 days | Discharge: HOME | End: 2022-04-27
Payer: MEDICARE

## 2022-04-27 DIAGNOSIS — Z96.1 PRESENCE OF INTRAOCULAR LENS: Chronic | ICD-10-CM

## 2022-04-27 DIAGNOSIS — I73.9 PERIPHERAL VASCULAR DISEASE, UNSPECIFIED: ICD-10-CM

## 2022-04-27 PROCEDURE — 71250 CT THORAX DX C-: CPT | Mod: 26

## 2022-12-13 NOTE — CONSULT NOTE ADULT - PROVIDER SPECIALTY LIST ADULT
Nephrology
Neurology
Physiatry
Critical Care
Zyclara Pregnancy And Lactation Text: This medication is Pregnancy Category C. It is unknown if this medication is excreted in breast milk.

## 2023-10-17 NOTE — PATIENT PROFILE ADULT - PATIENT REPRESENTATIVE: ( YOU CAN CHOOSE ANY PERSON THAT CAN ASSIST YOU WITH YOUR HEALTH CARE PREFERENCES, DOES NOT HAVE TO BE A SPOUSE, IMMEDIATE FAMILY OR SIGNIFICANT OTHER/PARTNER)
[facility] Hematology and Oncology Progress Note    Patient: Nia Prieto  MRN: [id number]  Date of Service: Oct 17, 2023         Reason for Visit    Chief Complaint   Patient presents with    Oncology Clinic Visit       Infiltrating ductal carcinoma of right breast         Assessment and Plan     Cancer Staging   Infiltrating ductal carcinoma of right breast (H)  Staging form: Breast, AJCC 8th Edition  - Pathologic stage from 6/22/2023: Stage IA (pT1b, pN0(sn), cM0, G2, ER+, PA+, HER2-) - Signed by Lennie Herrera MD on 7/10/2023      ECOG Performance    0 - Independent     Pain  Pain Score: No Pain (0)    #.  History of invasive ductal carcinoma of the upper outer quadrant of the right breast.  Grade 2.  ER positive, PA positive, HER2 negative.    #.  Low bone density    She has excellent tolerance to anastrozole.  She is comfortable continuing it. Refilled.    Her clinical exam today is unremarkable for breast cancer recurrence.  I recommended continue clinical surveillance with clinical exam in 6 months.      I recommended her to continue calcium/vitamin D and weightbearing exercises.  We will plan to obtain DEXA scan in [id number].    Encounter Diagnoses:    Problem List Items Addressed This Visit          Oncology Diagnoses    Infiltrating ductal carcinoma of right breast (H) - Primary       Other    Encounter for monitoring aromatase inhibitor therapy     Other Visit Diagnoses       Invasive ductal carcinoma of breast, stage 1, right (H)        Relevant Medications    anastrozole (ARIMIDEX) 1 MG tablet                 CC: Sammie Pérez MD   ______________________________________________________________________________  Diagnosis/ Treatment to date  6/2023- Invasive ductal carcinoma of the upper outer quadrant of the right breast.    Grade 2.   5.3 x 5 mm, Negative margins   There is associated DCIS with close margin.   pT1b (sn)N0   ER positive (%), PA positive (%), HER2 negative (1+  "by IHC).      6/8/2023- right breast lumpectomy and right axillary sentinel lymph node biopsy (Dr. Marie)  7/25/2023-8/21/2023-5256 cGy in 20 treatments of adjuvant radiation  8/22/2023-initiated anastrozole    History of Present Illness    Ms. Nia Prieto presented today for follow-up after starting anastrozole.    She reported no intolerable side effects from anastrozole.  She takes it regularly.  She started right after finishing adjuvant radiation.    Review of systems  Apart from describing in HPI, the remainder of comprehensive ROS was negative.    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    Eczema     Hypertension     Seasonal allergic rhinitis        Past Surgical History:   Procedure Laterality Date    CATARACT EXTRACTION Bilateral     COLONOSCOPY      EYE SURGERY      LAPAROSCOPIC CHOLECYSTECTOMY N/A 7/5/2023    Procedure: CHOLECYSTECTOMY, LAPAROSCOPIC;  Surgeon: Giorgi Stephens MD;  Location: [facility] OR    LUMPECTOMY BREAST Right 6/8/2023    Procedure: BREAST TAG LOCALIZED LUMPECTOMY WITH SENTINEL LYMPH NODE BIOPSY;  Surgeon: Lillian Marie DO;  [place] [facility] [place]       Physical Exam    /64   Pulse 80   Resp 18   Ht 1.619 m (5' 3.75\")   Wt 72.6 kg (160 lb)   SpO2 98%   BMI 27.68 kg/m      General: alert, awake, not in acute distress  HEENT: Head: Normal, normocephalic, atraumatic.  Eye: Normal external eye, conjunctiva, lids cornea, MOY.  Pharynx: Normal buccal mucosa. Normal pharynx.  Neck / Thyroid: Supple, no masses, nodes, nodules or enlargement.  Lymphatics: No abnormally enlarged lymph nodes.  Chest: Normal chest wall and respirations. Clear to auscultation.  Breasts: Unremarkable  Heart: S1 S2 RRR.   Abdomen: abdomen is soft without significant tenderness, masses, organomegaly or guarding  Extremities: normal strength, tone, and muscle " mass  Skin: normal. no rash or abnormalities  CNS: non focal.    Lab Results    No results found for this or any previous visit (from the past 168 hour(s)).      Imaging    No results found.    30 minutes spent on the date of the encounter doing chart review, history and exam, documentation, communication of the treatment plan with the care team and further activities as noted above.    Signed by: Lennie Herrera MD       declines

## 2024-08-29 NOTE — ED ADULT TRIAGE NOTE - ACCOMPANIED BY
Ibrahima RODRIGUEZ, This kid had an appt with psychology but it looks like psychology cancelled?  Mom would like to have it rescheduled.  Can you facilitate this? Thank you! c Self

## 2024-10-03 ENCOUNTER — APPOINTMENT (OUTPATIENT)
Dept: PULMONOLOGY | Facility: CLINIC | Age: 89
End: 2024-10-03

## 2025-04-25 ENCOUNTER — INPATIENT (INPATIENT)
Facility: HOSPITAL | Age: 89
LOS: 3 days | Discharge: HOME HEALTH | DRG: 682 | End: 2025-04-29
Attending: STUDENT IN AN ORGANIZED HEALTH CARE EDUCATION/TRAINING PROGRAM | Admitting: STUDENT IN AN ORGANIZED HEALTH CARE EDUCATION/TRAINING PROGRAM
Payer: MEDICARE

## 2025-04-25 VITALS
HEART RATE: 92 BPM | OXYGEN SATURATION: 95 % | SYSTOLIC BLOOD PRESSURE: 110 MMHG | TEMPERATURE: 97 F | DIASTOLIC BLOOD PRESSURE: 71 MMHG | RESPIRATION RATE: 18 BRPM

## 2025-04-25 DIAGNOSIS — N17.9 ACUTE KIDNEY FAILURE, UNSPECIFIED: ICD-10-CM

## 2025-04-25 DIAGNOSIS — Z96.1 PRESENCE OF INTRAOCULAR LENS: Chronic | ICD-10-CM

## 2025-04-25 LAB
ALBUMIN SERPL ELPH-MCNC: 3.7 G/DL — SIGNIFICANT CHANGE UP (ref 3.5–5.2)
ALP SERPL-CCNC: 123 U/L — HIGH (ref 30–115)
ALT FLD-CCNC: 16 U/L — SIGNIFICANT CHANGE UP (ref 0–41)
ANION GAP SERPL CALC-SCNC: 11 MMOL/L — SIGNIFICANT CHANGE UP (ref 7–14)
ANION GAP SERPL CALC-SCNC: 14 MMOL/L — SIGNIFICANT CHANGE UP (ref 7–14)
APPEARANCE UR: CLEAR — SIGNIFICANT CHANGE UP
AST SERPL-CCNC: 16 U/L — SIGNIFICANT CHANGE UP (ref 0–41)
BASOPHILS # BLD AUTO: 0.03 K/UL — SIGNIFICANT CHANGE UP (ref 0–0.2)
BASOPHILS NFR BLD AUTO: 0.4 % — SIGNIFICANT CHANGE UP (ref 0–1)
BILIRUB SERPL-MCNC: 0.4 MG/DL — SIGNIFICANT CHANGE UP (ref 0.2–1.2)
BILIRUB UR-MCNC: NEGATIVE — SIGNIFICANT CHANGE UP
BUN SERPL-MCNC: 55 MG/DL — HIGH (ref 10–20)
BUN SERPL-MCNC: 55 MG/DL — HIGH (ref 10–20)
CALCIUM SERPL-MCNC: 8.1 MG/DL — LOW (ref 8.4–10.5)
CALCIUM SERPL-MCNC: 8.6 MG/DL — SIGNIFICANT CHANGE UP (ref 8.4–10.5)
CHLORIDE SERPL-SCNC: 103 MMOL/L — SIGNIFICANT CHANGE UP (ref 98–110)
CHLORIDE SERPL-SCNC: 106 MMOL/L — SIGNIFICANT CHANGE UP (ref 98–110)
CO2 SERPL-SCNC: 24 MMOL/L — SIGNIFICANT CHANGE UP (ref 17–32)
CO2 SERPL-SCNC: 24 MMOL/L — SIGNIFICANT CHANGE UP (ref 17–32)
COLOR SPEC: YELLOW — SIGNIFICANT CHANGE UP
CREAT ?TM UR-MCNC: 161 MG/DL — SIGNIFICANT CHANGE UP
CREAT SERPL-MCNC: 3.8 MG/DL — HIGH (ref 0.7–1.5)
CREAT SERPL-MCNC: 4.1 MG/DL — CRITICAL HIGH (ref 0.7–1.5)
DIFF PNL FLD: NEGATIVE — SIGNIFICANT CHANGE UP
EGFR: 13 ML/MIN/1.73M2 — LOW
EGFR: 13 ML/MIN/1.73M2 — LOW
EGFR: 15 ML/MIN/1.73M2 — LOW
EGFR: 15 ML/MIN/1.73M2 — LOW
EOSINOPHIL # BLD AUTO: 0.06 K/UL — SIGNIFICANT CHANGE UP (ref 0–0.7)
EOSINOPHIL NFR BLD AUTO: 0.8 % — SIGNIFICANT CHANGE UP (ref 0–8)
GAS PNL BLDV: SIGNIFICANT CHANGE UP
GLUCOSE SERPL-MCNC: 91 MG/DL — SIGNIFICANT CHANGE UP (ref 70–99)
GLUCOSE SERPL-MCNC: 99 MG/DL — SIGNIFICANT CHANGE UP (ref 70–99)
GLUCOSE UR QL: NEGATIVE MG/DL — SIGNIFICANT CHANGE UP
HCT VFR BLD CALC: 49.2 % — SIGNIFICANT CHANGE UP (ref 42–52)
HGB BLD-MCNC: 15 G/DL — SIGNIFICANT CHANGE UP (ref 14–18)
IMM GRANULOCYTES NFR BLD AUTO: 1.3 % — HIGH (ref 0.1–0.3)
KETONES UR-MCNC: NEGATIVE MG/DL — SIGNIFICANT CHANGE UP
LEUKOCYTE ESTERASE UR-ACNC: NEGATIVE — SIGNIFICANT CHANGE UP
LYMPHOCYTES # BLD AUTO: 1.31 K/UL — SIGNIFICANT CHANGE UP (ref 1.2–3.4)
LYMPHOCYTES # BLD AUTO: 17.7 % — LOW (ref 20.5–51.1)
MCHC RBC-ENTMCNC: 29.9 PG — SIGNIFICANT CHANGE UP (ref 27–31)
MCHC RBC-ENTMCNC: 30.5 G/DL — LOW (ref 32–37)
MCV RBC AUTO: 98 FL — HIGH (ref 80–94)
MONOCYTES # BLD AUTO: 0.63 K/UL — HIGH (ref 0.1–0.6)
MONOCYTES NFR BLD AUTO: 8.5 % — SIGNIFICANT CHANGE UP (ref 1.7–9.3)
NEUTROPHILS # BLD AUTO: 5.28 K/UL — SIGNIFICANT CHANGE UP (ref 1.4–6.5)
NEUTROPHILS NFR BLD AUTO: 71.3 % — SIGNIFICANT CHANGE UP (ref 42.2–75.2)
NITRITE UR-MCNC: NEGATIVE — SIGNIFICANT CHANGE UP
NRBC BLD AUTO-RTO: 0 /100 WBCS — SIGNIFICANT CHANGE UP (ref 0–0)
PH UR: 5.5 — SIGNIFICANT CHANGE UP (ref 5–8)
PLATELET # BLD AUTO: 288 K/UL — SIGNIFICANT CHANGE UP (ref 130–400)
PMV BLD: 11 FL — HIGH (ref 7.4–10.4)
POTASSIUM SERPL-MCNC: 5.5 MMOL/L — HIGH (ref 3.5–5)
POTASSIUM SERPL-MCNC: 5.5 MMOL/L — HIGH (ref 3.5–5)
POTASSIUM SERPL-SCNC: 5.5 MMOL/L — HIGH (ref 3.5–5)
POTASSIUM SERPL-SCNC: 5.5 MMOL/L — HIGH (ref 3.5–5)
POTASSIUM UR-SCNC: 22 MMOL/L — SIGNIFICANT CHANGE UP
PROT ?TM UR-MCNC: 58 MG/DL — SIGNIFICANT CHANGE UP
PROT SERPL-MCNC: 7.4 G/DL — SIGNIFICANT CHANGE UP (ref 6–8)
PROT UR-MCNC: 30 MG/DL
PROT/CREAT UR-RTO: 0.4 RATIO — HIGH (ref 0–0.2)
RBC # BLD: 5.02 M/UL — SIGNIFICANT CHANGE UP (ref 4.7–6.1)
RBC # FLD: 13.7 % — SIGNIFICANT CHANGE UP (ref 11.5–14.5)
SODIUM SERPL-SCNC: 141 MMOL/L — SIGNIFICANT CHANGE UP (ref 135–146)
SODIUM SERPL-SCNC: 141 MMOL/L — SIGNIFICANT CHANGE UP (ref 135–146)
SODIUM UR-SCNC: 34 MMOL/L — SIGNIFICANT CHANGE UP
SP GR SPEC: 1.02 — SIGNIFICANT CHANGE UP (ref 1–1.03)
UROBILINOGEN FLD QL: 1 MG/DL — SIGNIFICANT CHANGE UP (ref 0.2–1)
UUN UR-MCNC: 761 MG/DL — SIGNIFICANT CHANGE UP
WBC # BLD: 7.41 K/UL — SIGNIFICANT CHANGE UP (ref 4.8–10.8)
WBC # FLD AUTO: 7.41 K/UL — SIGNIFICANT CHANGE UP (ref 4.8–10.8)

## 2025-04-25 PROCEDURE — 97162 PT EVAL MOD COMPLEX 30 MIN: CPT | Mod: GP

## 2025-04-25 PROCEDURE — 71250 CT THORAX DX C-: CPT | Mod: 26

## 2025-04-25 PROCEDURE — 36415 COLL VENOUS BLD VENIPUNCTURE: CPT

## 2025-04-25 PROCEDURE — 82570 ASSAY OF URINE CREATININE: CPT

## 2025-04-25 PROCEDURE — 94640 AIRWAY INHALATION TREATMENT: CPT

## 2025-04-25 PROCEDURE — 80053 COMPREHEN METABOLIC PANEL: CPT

## 2025-04-25 PROCEDURE — 87640 STAPH A DNA AMP PROBE: CPT

## 2025-04-25 PROCEDURE — 99285 EMERGENCY DEPT VISIT HI MDM: CPT | Mod: FS

## 2025-04-25 PROCEDURE — 99222 1ST HOSP IP/OBS MODERATE 55: CPT | Mod: AI

## 2025-04-25 PROCEDURE — 85025 COMPLETE CBC W/AUTO DIFF WBC: CPT

## 2025-04-25 PROCEDURE — 87641 MR-STAPH DNA AMP PROBE: CPT

## 2025-04-25 PROCEDURE — 83735 ASSAY OF MAGNESIUM: CPT

## 2025-04-25 PROCEDURE — 76775 US EXAM ABDO BACK WALL LIM: CPT

## 2025-04-25 PROCEDURE — 84300 ASSAY OF URINE SODIUM: CPT

## 2025-04-25 PROCEDURE — 74176 CT ABD & PELVIS W/O CONTRAST: CPT | Mod: 26

## 2025-04-25 PROCEDURE — 71046 X-RAY EXAM CHEST 2 VIEWS: CPT | Mod: 26

## 2025-04-25 PROCEDURE — 80048 BASIC METABOLIC PNL TOTAL CA: CPT

## 2025-04-25 PROCEDURE — 93010 ELECTROCARDIOGRAM REPORT: CPT

## 2025-04-25 PROCEDURE — 84100 ASSAY OF PHOSPHORUS: CPT

## 2025-04-25 PROCEDURE — 84540 ASSAY OF URINE/UREA-N: CPT

## 2025-04-25 PROCEDURE — 74176 CT ABD & PELVIS W/O CONTRAST: CPT | Mod: MC

## 2025-04-25 PROCEDURE — 71250 CT THORAX DX C-: CPT | Mod: MC

## 2025-04-25 PROCEDURE — 76775 US EXAM ABDO BACK WALL LIM: CPT | Mod: 26

## 2025-04-25 PROCEDURE — 84133 ASSAY OF URINE POTASSIUM: CPT

## 2025-04-25 PROCEDURE — 84156 ASSAY OF PROTEIN URINE: CPT

## 2025-04-25 PROCEDURE — 81001 URINALYSIS AUTO W/SCOPE: CPT

## 2025-04-25 RX ORDER — TIOTROPIUM BROMIDE INHALATION SPRAY 3.12 UG/1
2 SPRAY, METERED RESPIRATORY (INHALATION) DAILY
Refills: 0 | Status: DISCONTINUED | OUTPATIENT
Start: 2025-04-25 | End: 2025-04-29

## 2025-04-25 RX ORDER — ALBUTEROL SULFATE 2.5 MG/3ML
2 VIAL, NEBULIZER (ML) INHALATION
Refills: 0 | Status: DISCONTINUED | OUTPATIENT
Start: 2025-04-25 | End: 2025-04-25

## 2025-04-25 RX ORDER — MELATONIN 5 MG
3 TABLET ORAL AT BEDTIME
Refills: 0 | Status: DISCONTINUED | OUTPATIENT
Start: 2025-04-25 | End: 2025-04-29

## 2025-04-25 RX ORDER — AZITHROMYCIN 250 MG
500 CAPSULE ORAL EVERY 24 HOURS
Refills: 0 | Status: DISCONTINUED | OUTPATIENT
Start: 2025-04-25 | End: 2025-04-29

## 2025-04-25 RX ORDER — DEXTROMETHORPHAN HBR, GUAIFENESIN 20; 200 MG/10ML; MG/10ML
10 SOLUTION ORAL EVERY 8 HOURS
Refills: 0 | Status: DISCONTINUED | OUTPATIENT
Start: 2025-04-25 | End: 2025-04-29

## 2025-04-25 RX ORDER — ASPIRIN 325 MG
81 TABLET ORAL DAILY
Refills: 0 | Status: DISCONTINUED | OUTPATIENT
Start: 2025-04-25 | End: 2025-04-29

## 2025-04-25 RX ORDER — NIFEDIPINE 30 MG
60 TABLET, EXTENDED RELEASE 24 HR ORAL DAILY
Refills: 0 | Status: DISCONTINUED | OUTPATIENT
Start: 2025-04-25 | End: 2025-04-29

## 2025-04-25 RX ORDER — CEFTRIAXONE 500 MG/1
1000 INJECTION, POWDER, FOR SOLUTION INTRAMUSCULAR; INTRAVENOUS EVERY 24 HOURS
Refills: 0 | Status: DISCONTINUED | OUTPATIENT
Start: 2025-04-25 | End: 2025-04-29

## 2025-04-25 RX ORDER — ACETAMINOPHEN 500 MG/5ML
650 LIQUID (ML) ORAL EVERY 6 HOURS
Refills: 0 | Status: DISCONTINUED | OUTPATIENT
Start: 2025-04-25 | End: 2025-04-29

## 2025-04-25 RX ORDER — TAMSULOSIN HYDROCHLORIDE 0.4 MG/1
0.4 CAPSULE ORAL AT BEDTIME
Refills: 0 | Status: DISCONTINUED | OUTPATIENT
Start: 2025-04-25 | End: 2025-04-29

## 2025-04-25 RX ORDER — ATORVASTATIN CALCIUM 80 MG/1
80 TABLET, FILM COATED ORAL AT BEDTIME
Refills: 0 | Status: DISCONTINUED | OUTPATIENT
Start: 2025-04-25 | End: 2025-04-29

## 2025-04-25 RX ORDER — SALINE 7; 19 G/118ML; G/118ML
1 ENEMA RECTAL ONCE
Refills: 0 | Status: DISCONTINUED | OUTPATIENT
Start: 2025-04-25 | End: 2025-04-25

## 2025-04-25 RX ORDER — ONDANSETRON HCL/PF 4 MG/2 ML
4 VIAL (ML) INJECTION EVERY 8 HOURS
Refills: 0 | Status: DISCONTINUED | OUTPATIENT
Start: 2025-04-25 | End: 2025-04-29

## 2025-04-25 RX ORDER — BENZONATATE 100 MG
100 CAPSULE ORAL EVERY 8 HOURS
Refills: 0 | Status: DISCONTINUED | OUTPATIENT
Start: 2025-04-25 | End: 2025-04-29

## 2025-04-25 RX ORDER — ZINC SULFATE 50(220)MG
220 CAPSULE ORAL DAILY
Refills: 0 | Status: DISCONTINUED | OUTPATIENT
Start: 2025-04-25 | End: 2025-04-29

## 2025-04-25 RX ORDER — MINERAL OIL
133 OIL (ML) MISCELLANEOUS ONCE
Refills: 0 | Status: DISCONTINUED | OUTPATIENT
Start: 2025-04-25 | End: 2025-04-29

## 2025-04-25 RX ORDER — HEPARIN SODIUM 1000 [USP'U]/ML
5000 INJECTION INTRAVENOUS; SUBCUTANEOUS EVERY 8 HOURS
Refills: 0 | Status: DISCONTINUED | OUTPATIENT
Start: 2025-04-25 | End: 2025-04-29

## 2025-04-25 RX ORDER — MAGNESIUM, ALUMINUM HYDROXIDE 200-200 MG
30 TABLET,CHEWABLE ORAL EVERY 4 HOURS
Refills: 0 | Status: DISCONTINUED | OUTPATIENT
Start: 2025-04-25 | End: 2025-04-29

## 2025-04-25 RX ORDER — SODIUM ZIRCONIUM CYCLOSILICATE 5 G/5G
10 POWDER, FOR SUSPENSION ORAL ONCE
Refills: 0 | Status: DISCONTINUED | OUTPATIENT
Start: 2025-04-25 | End: 2025-04-29

## 2025-04-25 RX ORDER — IPRATROPIUM BROMIDE AND ALBUTEROL SULFATE .5; 2.5 MG/3ML; MG/3ML
3 SOLUTION RESPIRATORY (INHALATION) EVERY 6 HOURS
Refills: 0 | Status: DISCONTINUED | OUTPATIENT
Start: 2025-04-25 | End: 2025-04-29

## 2025-04-25 RX ADMIN — HEPARIN SODIUM 5000 UNIT(S): 1000 INJECTION INTRAVENOUS; SUBCUTANEOUS at 21:59

## 2025-04-25 RX ADMIN — Medication 220 MILLIGRAM(S): at 21:58

## 2025-04-25 RX ADMIN — TAMSULOSIN HYDROCHLORIDE 0.4 MILLIGRAM(S): 0.4 CAPSULE ORAL at 21:58

## 2025-04-25 RX ADMIN — Medication 500 MILLIGRAM(S): at 21:58

## 2025-04-25 RX ADMIN — ATORVASTATIN CALCIUM 80 MILLIGRAM(S): 80 TABLET, FILM COATED ORAL at 21:59

## 2025-04-25 RX ADMIN — Medication 75 MILLILITER(S): at 21:59

## 2025-04-25 NOTE — ED ADULT TRIAGE NOTE - PATIENT ON (OXYGEN DELIVERY METHOD)
Shift Summary: [facility] [id number]    Vitals with min/max:   Vital Last Value 24 Hour Range   Temperature 98.8 °F (37.1 °C) (05/20/24 0400) Temp  Min: 98.1 °F (36.7 °C)  Max: 100 °F (37.8 °C)   Pulse 98 (05/20/24 0400) Pulse  Min: 90  Max: 98   Respiratory 18 (05/20/24 0400) Resp  Min: 16  Max: 18   Non-Invasive  Blood Pressure 121/78 (05/20/24 0400) BP  Min: 121/78  Max: 148/86   Pulse Oximetry 91 % (05/20/24 0400) SpO2  Min: 91 %  Max: 99 %       Neuro status: A&O x  4    Cardiac: A-flutter    Respiratory: Oxygen 2L oxymask when sleeping      : Wilder:  adequate output      GI: Bowel Tones:   Hypoactive     Incisions & Skin: See integumentary assessment.    Diet: Regular Diet    Activity:  Q2T/jackie - refusing turns     I&O this shift:   Intake/Output Summary (Last 24 hours) at 5/20/2024 0623  Last data filed at 5/20/2024 0558  Gross per 24 hour   Intake 720 ml   Output 3350 ml   Net -2630 ml       Pain Management: Last Pain Score: Numeric Rating Scale 0-10: 8 (05/20/24 0600)                                      Pain medication:  Norco.  Last given:  0530    Current infusions: None    Lines: L wrist, LAC    Pertinent labs:     Plan of care/upcoming events:  IV antibiotics.   room air

## 2025-04-25 NOTE — ED PROVIDER NOTE - IV ALTEPLASE EXCL ABS HIDDEN
show PMI and heart sounds localize heart on left side of chest/Pulse with normal variation, frequency and intensity (amplitude & strength) with equal intensity on upper and lower extremities/Blood pressure value(s) are adequate

## 2025-04-25 NOTE — ED PROVIDER NOTE - ATTENDING APP SHARED VISIT CONTRIBUTION OF CARE
I have personally performed a history and physical exam on this patient and personally directed the management of the patient. Patient is an 89-year-old male presents for evaluation of increasing generalized weakness onset over the past several weeks to months patient has been having a steady decline in function history is corroborated by the patient's brother he visited an outpatient laboratory and found to have an abnormal lab results however was unable to name the tests upon review of [facility] it appears that the patient has newly elevated BUN/creatinine patient denies any headache visual changes neck pain chest pain shortness of breath abdominal pain fevers chills he does have decreased p.o. intake patient denies any focal weakness at this time his brother who is here states that he has noticed patient declining in function    On physical exam patient is normocephalic atraumatic pupils equal round react light accommodation extraocular muscles intact oropharynx clear but dry chest clear to station bilaterally abdomen soft nontender nondistended bowel sounds positive extremities full range of motion no focal deficits at this time patient is able to ambulate radial pulses 2+ pedal pulses 2+ patient appears dehydrated    Assessment plan we obtained EKG per my independent valuation consistent with STEMI no consistent with QT prolongation consistent with arrhythmia in addition obtain chest x-ray per my independent evaluation not consistent with pneumonia or pneumothorax patient does had evidence of opacities however given he is afebrile with no cough or shortness of breath is not consistent with infection at this time given patient's age past medical history we obtain labs patient has increased BUN/creatinine 55/4.1 patient given IV fluids urinalysis is not consistent with infection at this time I will admit for further evaluation family

## 2025-04-25 NOTE — PATIENT PROFILE ADULT - FALL HARM RISK - HARM RISK INTERVENTIONS

## 2025-04-25 NOTE — ED ADULT TRIAGE NOTE - SPO2 (%)
-Apply antibiotic eye drops/ointment as prescribed.  -Make appointment to follow up with ophthalmology or pediatrician in the next 24 to 48 hours to ensure proper healing of your corneal abrasion.   -If you wear contact lenses, keep out of eyes, throw out your old pair of contacts, and use a fresh pair of contacts after your eye is completely healed.   -Return to the Emergency Department for any new worsening symptoms such as vision changes, severe pain or painful eye movements, especially with fevers (temp >100.4). Otherwise, follow up with your Primary Care Provider. Call [phone] [phone]) if you need a doctor or specialist. Thank you for choosing [facility]!    
95

## 2025-04-25 NOTE — H&P ADULT - NSHPSOCIALHISTORY_GEN_ALL_CORE
Substance Use (street drugs): ( x ) never used  (  ) other:  Tobacco Usage:  ( x  ) never smoked   (   ) former smoker   (   ) current smoker  (     ) pack year  Alcohol Usage: None Substance Use (street drugs): ( x ) never used  (  ) other:  Tobacco Usage: (+) 1/2 PPD  Alcohol Usage: None

## 2025-04-25 NOTE — H&P ADULT - NS ATTEND AMEND GEN_ALL_CORE FT
Acute Kidney Injury superimposed on CKD IV   Baseline Scr around  2.8-3.2 , GFR: 17-21   Presented with Scr of 4.1, improved with some IVF   Continue IVF, check renal bladder sono and , urine studies       Community acquired Pneumonia   COPD + Active Smoker  Endorsed productive cough, weakness and anorexia   Start IV ABX, and respiratory Treatments   Check CT chest given smoking H/O

## 2025-04-25 NOTE — H&P ADULT - HISTORY OF PRESENT ILLNESS
Patient is an 88 y/o M with PMH COPD not on home O2, current smoker, HLD who was sent in by PMD after routine blood work showed an elevated Creatinine level.  Patient is an 88 y/o M with PMH COPD not on home O2, current smoker, HLD, [place], who was sent in by PMD after blood work showed an elevated Creatinine level. Patient states a several week history of a productive cough associated with rhinorrhea and weakness that's been progressively worsening. Was seen by PMD for his symptoms, had blood work done which showed DMITRIY prompting ER visit. Patient denies SOB, fever, chills, n/v/d, urinary or bowel complaints.

## 2025-04-25 NOTE — ED ADULT NURSE NOTE - NSICDXPASTMEDICALHX_GEN_ALL_CORE_FT
PAST MEDICAL HISTORY:  Advanced COPD     H/O: HTN (hypertension)     [place] (hard of hearing)

## 2025-04-25 NOTE — H&P ADULT - NSHPLABSRESULTS_GEN_ALL_CORE
LABS:                        15.0   7.41  )-----------( 288      ( 25 Apr 2025 17:00 )             49.2     04-25    141  |  103  |  55[H]  ----------------------------<  99  5.5[H]   |  24  |  4.1[HH]    Ca    8.6      25 Apr 2025 17:00    TPro  7.4  /  Alb  3.7  /  TBili  0.4  /  DBili  x   /  AST  16  /  ALT  16  /  AlkPhos  123[H]  04-25    LIVER FUNCTIONS - ( 25 Apr 2025 17:00 )  Alb: 3.7 g/dL / Pro: 7.4 g/dL / ALK PHOS: 123 U/L / ALT: 16 U/L / AST: 16 U/L / GGT: x             Urinalysis Basic - ( 25 Apr 2025 17:00 )    Color: x / Appearance: x / SG: x / pH: x  Gluc: 99 mg/dL / Ketone: x  / Bili: x / Urobili: x   Blood: x / Protein: x / Nitrite: x   Leuk Esterase: x / RBC: x / WBC x   Sq Epi: x / Non Sq Epi: x / Bacteria: x  ++++++++++++++++++++++++++++++++++++++++++++++++++++++++++++++++++++++++++++++++++++++++++++++  < from: Xray Chest 2 Views PA/Lat (04.25.25 @ 15:18) >    IMPRESSION:    New right upper lung peripheral opacity.    Follow up to resolution is recommended to exclude underlying process.    Stable biapical opacities..    --- End of Report ---    ELLIOT EGAN MD; Resident Radiologist  This document has been electronically signed.  CLARICE TORIBIO MD; Attending Radiologist  This document has been electronically signed. Apr 25 2025  3:46PM    < end of copied text >

## 2025-04-25 NOTE — H&P ADULT - NSICDXPASTMEDICALHX_GEN_ALL_CORE_FT
PAST MEDICAL HISTORY:  Advanced COPD     H/O: HTN (hypertension)     [place] (hard of hearing)

## 2025-04-25 NOTE — ED PROVIDER NOTE - OBJECTIVE STATEMENT
. Patient is an 89-year-old male presents for evaluation of increasing generalized weakness onset over the past several weeks to months patient has been having a steady decline in function history is corroborated by the patient's brother he visited an outpatient laboratory and found to have an abnormal lab results however was unable to name the tests upon review of [facility] it appears that the patient has newly elevated BUN/creatinine patient denies any headache visual changes neck pain chest pain shortness of breath abdominal pain fevers chills he does have decreased p.o. intake patient denies any focal weakness at this time his brother who is here states that he has noticed patient declining in function

## 2025-04-25 NOTE — ED ADULT NURSE NOTE - NS ED PATIENT SAFETY CONCERN
Has The Growth Been Previously Biopsied?: has been previously biopsied Body Location Override (Optional): Left Medial Superior Forehead No

## 2025-04-25 NOTE — ED PROVIDER NOTE - NSICDXPASTMEDICALHX_GEN_ALL_CORE_FT
PAST MEDICAL HISTORY:  Advanced COPD     H/O: HTN (hypertension)     [place] (hard of hearing)

## 2025-04-25 NOTE — ED ADULT NURSE NOTE - OBJECTIVE STATEMENT
pt in er for complaints of weakness and fatigue for one week   pts brother stated pts kidney levels were abnormal

## 2025-04-25 NOTE — H&P ADULT - NSHPPHYSICALEXAM_GEN_ALL_CORE
Vital Signs Last 24 Hrs  T(C): 36.3 (25 Apr 2025 13:58), Max: 36.3 (25 Apr 2025 13:58)  T(F): 97.3 (25 Apr 2025 13:58), Max: 97.3 (25 Apr 2025 13:58)  HR: 92 (25 Apr 2025 13:58) (92 - 92)  BP: 110/71 (25 Apr 2025 13:58) (110/71 - 110/71)  RR: 18 (25 Apr 2025 13:58) (18 - 18)  SpO2: 95% (25 Apr 2025 13:58) (95% - 95%)    Parameters below as of 25 Apr 2025 13:58  Patient On (Oxygen Delivery Method): room air    VITALS:     GENERAL: NAD, lying in bed comfortably  HEAD:  Atraumatic, Normocephalic  EYES: EOMI, PERRLA, conjunctiva and sclera clear  ENT: Moist mucous membranes  NECK: Supple, No JVD  CHEST/LUNG: Clear to auscultation bilaterally; No rales, rhonchi, wheezing, or rubs. Unlabored respirations  HEART: Regular rate and rhythm; No murmurs, rubs, or gallops  ABDOMEN: Bowel sounds present; Soft, Nontender, Nondistended. No hepatomegally  EXTREMITIES:  2+ Peripheral Pulses, brisk capillary refill. No clubbing, cyanosis, or edema  NERVOUS SYSTEM:  Alert & Oriented X3, speech clear. No deficits   MSK: FROM all 4 extremities, full and equal strength  SKIN: No rashes or lesions

## 2025-04-25 NOTE — H&P ADULT - ASSESSMENT
Assessment:        Plan:      #   - admit to inpatient level of care-med/surg  - CT chest: New right upper lung peripheral opacity  - Trend WBC and fever curve  - Continue with IV abx  - ID ???  - Follow up urine and blood cultures, MRSA PCR, legionella, strep  -       # DMITRIY on CKD  - Urine studies  - Continue with IV hydrations  - Nephrology consulted- follow up recommendations  - Kidney and bladder sonogram  Assessment:        Plan:      # PNA  - admit to inpatient level of care-med/surg  - CT chest: New right upper lung peripheral opacity  - Trend WBC and fever curve  - Continue with IV abx  - ID ???  - Follow up urine and blood cultures, MRSA PCR, legionella, strep  -       # DMITRIY on CKD  - Urine studies  - Continue with IV hydrations  - Nephrology consulted- follow up recommendations  - Kidney and bladder sonogram     # COPD  - No signs of respiratory distress.  - Monitor pulse ox        CHG ordered.  VTE ppx: heparin  GI ppx: not indicated.  Diet: DASH        Above discussed with Dr. Glez     Assessment:  Patient is an 88 y/o M with PMH COPD not on home O2, current smoker, HLD, [place], who was sent in by PMD after blood work showed an elevated Creatinine level.       Plan:      # Weakness and cough 2/2 to PNA  # Hx of COPD (current smoker)  - Sepsis not present on admission  - admit to inpatient level of care-med/surg  - Chest x-ray: New right upper lung peripheral opacity  - CT chest ordered  - Trend WBC and fever curve  - Continue with IV abx  - Follow up urine and blood cultures, MRSA PCR, legionella, strep        # DMITRIY on CKD  - Urine studies  - Continue with IV hydrations  - Nephrology consulted- follow up recommendations  - Kidney and bladder sonogram   - CT abdomen and pelvis ordered.    # COPD  - No signs of respiratory distress.  - Monitor pulse ox  - Continue with inhalers    # HTN  - Monitor blood pressure  - Continue with nifedipine      CHG ordered.  VTE ppx: heparin  GI ppx: not indicated.  Diet: DASH        Above discussed with Dr. Glez     Assessment:  Patient is an 88 y/o M with PMH COPD not on home O2, current smoker, HLD, [place], who was sent in by PMD after blood work showed an elevated Creatinine level.       Plan:      # Weakness and cough 2/2 to PNA  # Hx of COPD (current smoker)  - Sepsis not present on admission  - admit to inpatient level of care-med/surg  - Chest x-ray: New right upper lung peripheral opacity  - CT chest ordered  - Trend WBC and fever curve  - Follow up urine and blood cultures, MRSA PCR, legionella, strep        # DMITRIY on CKD  - Urine studies  - Continue with IV hydrations  - Nephrology consulted- follow up recommendations  - Kidney and bladder sonogram   - CT abdomen and pelvis ordered.        # COPD  - No signs of respiratory distress.  - Monitor pulse ox  - Continue with inhalers      # HTN  - Monitor blood pressure  - Continue with nifedipine      CHG ordered.  VTE ppx: heparin  GI ppx: not indicated.  Diet: DASH        Above discussed with Dr. Glez

## 2025-04-25 NOTE — ED PROVIDER NOTE - PHYSICAL EXAMINATION
On physical exam patient is normocephalic atraumatic pupils equal round react light accommodation extraocular muscles intact oropharynx clear but dry chest clear to station bilaterally abdomen soft nontender nondistended bowel sounds positive extremities full range of motion no focal deficits at this time patient is able to ambulate radial pulses 2+ pedal pulses 2+ patient appears dehydrated

## 2025-04-26 LAB
ALBUMIN SERPL ELPH-MCNC: 2.9 G/DL — LOW (ref 3.5–5.2)
ALP SERPL-CCNC: 99 U/L — SIGNIFICANT CHANGE UP (ref 30–115)
ALT FLD-CCNC: 10 U/L — SIGNIFICANT CHANGE UP (ref 0–41)
ANION GAP SERPL CALC-SCNC: 11 MMOL/L — SIGNIFICANT CHANGE UP (ref 7–14)
AST SERPL-CCNC: 10 U/L — SIGNIFICANT CHANGE UP (ref 0–41)
BASOPHILS # BLD AUTO: 0.03 K/UL — SIGNIFICANT CHANGE UP (ref 0–0.2)
BASOPHILS NFR BLD AUTO: 0.4 % — SIGNIFICANT CHANGE UP (ref 0–1)
BILIRUB SERPL-MCNC: 0.2 MG/DL — SIGNIFICANT CHANGE UP (ref 0.2–1.2)
BUN SERPL-MCNC: 67 MG/DL — CRITICAL HIGH (ref 10–20)
CALCIUM SERPL-MCNC: 7.5 MG/DL — LOW (ref 8.4–10.5)
CHLORIDE SERPL-SCNC: 107 MMOL/L — SIGNIFICANT CHANGE UP (ref 98–110)
CO2 SERPL-SCNC: 22 MMOL/L — SIGNIFICANT CHANGE UP (ref 17–32)
CREAT SERPL-MCNC: 4.4 MG/DL — CRITICAL HIGH (ref 0.7–1.5)
EGFR: 12 ML/MIN/1.73M2 — LOW
EGFR: 12 ML/MIN/1.73M2 — LOW
EOSINOPHIL # BLD AUTO: 0.07 K/UL — SIGNIFICANT CHANGE UP (ref 0–0.7)
EOSINOPHIL NFR BLD AUTO: 1 % — SIGNIFICANT CHANGE UP (ref 0–8)
GLUCOSE SERPL-MCNC: 102 MG/DL — HIGH (ref 70–99)
HCT VFR BLD CALC: 40.1 % — LOW (ref 42–52)
HGB BLD-MCNC: 12.4 G/DL — LOW (ref 14–18)
IMM GRANULOCYTES NFR BLD AUTO: 1 % — HIGH (ref 0.1–0.3)
LEGIONELLA AG UR QL: NEGATIVE — SIGNIFICANT CHANGE UP
LYMPHOCYTES # BLD AUTO: 0.91 K/UL — LOW (ref 1.2–3.4)
LYMPHOCYTES # BLD AUTO: 12.4 % — LOW (ref 20.5–51.1)
MAGNESIUM SERPL-MCNC: 1.8 MG/DL — SIGNIFICANT CHANGE UP (ref 1.8–2.4)
MCHC RBC-ENTMCNC: 30.2 PG — SIGNIFICANT CHANGE UP (ref 27–31)
MCHC RBC-ENTMCNC: 30.9 G/DL — LOW (ref 32–37)
MCV RBC AUTO: 97.6 FL — HIGH (ref 80–94)
MONOCYTES # BLD AUTO: 0.75 K/UL — HIGH (ref 0.1–0.6)
MONOCYTES NFR BLD AUTO: 10.2 % — HIGH (ref 1.7–9.3)
MRSA PCR RESULT.: NEGATIVE — SIGNIFICANT CHANGE UP
NEUTROPHILS # BLD AUTO: 5.51 K/UL — SIGNIFICANT CHANGE UP (ref 1.4–6.5)
NEUTROPHILS NFR BLD AUTO: 75 % — SIGNIFICANT CHANGE UP (ref 42.2–75.2)
NRBC BLD AUTO-RTO: 0 /100 WBCS — SIGNIFICANT CHANGE UP (ref 0–0)
OSMOLALITY UR: 447 MOS/KG — SIGNIFICANT CHANGE UP (ref 50–1400)
PHOSPHATE SERPL-MCNC: 3.2 MG/DL — SIGNIFICANT CHANGE UP (ref 2.1–4.9)
PLATELET # BLD AUTO: 259 K/UL — SIGNIFICANT CHANGE UP (ref 130–400)
PMV BLD: 10.8 FL — HIGH (ref 7.4–10.4)
POTASSIUM SERPL-MCNC: 4.7 MMOL/L — SIGNIFICANT CHANGE UP (ref 3.5–5)
POTASSIUM SERPL-SCNC: 4.7 MMOL/L — SIGNIFICANT CHANGE UP (ref 3.5–5)
PROT SERPL-MCNC: 5.5 G/DL — LOW (ref 6–8)
RBC # BLD: 4.11 M/UL — LOW (ref 4.7–6.1)
RBC # FLD: 13.6 % — SIGNIFICANT CHANGE UP (ref 11.5–14.5)
S PNEUM AG UR QL: NEGATIVE — SIGNIFICANT CHANGE UP
SODIUM SERPL-SCNC: 140 MMOL/L — SIGNIFICANT CHANGE UP (ref 135–146)
WBC # BLD: 7.34 K/UL — SIGNIFICANT CHANGE UP (ref 4.8–10.8)
WBC # FLD AUTO: 7.34 K/UL — SIGNIFICANT CHANGE UP (ref 4.8–10.8)

## 2025-04-26 PROCEDURE — 99232 SBSQ HOSP IP/OBS MODERATE 35: CPT

## 2025-04-26 RX ADMIN — IPRATROPIUM BROMIDE AND ALBUTEROL SULFATE 3 MILLILITER(S): .5; 2.5 SOLUTION RESPIRATORY (INHALATION) at 14:50

## 2025-04-26 RX ADMIN — Medication 500 MILLIGRAM(S): at 14:38

## 2025-04-26 RX ADMIN — Medication 100 MILLIGRAM(S): at 14:37

## 2025-04-26 RX ADMIN — Medication 4 MILLILITER(S): at 20:27

## 2025-04-26 RX ADMIN — DEXTROMETHORPHAN HBR, GUAIFENESIN 10 MILLILITER(S): 20; 200 SOLUTION ORAL at 04:41

## 2025-04-26 RX ADMIN — Medication 4 MILLILITER(S): at 03:34

## 2025-04-26 RX ADMIN — ATORVASTATIN CALCIUM 80 MILLIGRAM(S): 80 TABLET, FILM COATED ORAL at 21:35

## 2025-04-26 RX ADMIN — Medication 100 MILLIGRAM(S): at 04:41

## 2025-04-26 RX ADMIN — DEXTROMETHORPHAN HBR, GUAIFENESIN 10 MILLILITER(S): 20; 200 SOLUTION ORAL at 21:35

## 2025-04-26 RX ADMIN — HEPARIN SODIUM 5000 UNIT(S): 1000 INJECTION INTRAVENOUS; SUBCUTANEOUS at 21:35

## 2025-04-26 RX ADMIN — Medication 4 MILLILITER(S): at 08:35

## 2025-04-26 RX ADMIN — IPRATROPIUM BROMIDE AND ALBUTEROL SULFATE 3 MILLILITER(S): .5; 2.5 SOLUTION RESPIRATORY (INHALATION) at 20:27

## 2025-04-26 RX ADMIN — TIOTROPIUM BROMIDE INHALATION SPRAY 2 PUFF(S): 3.12 SPRAY, METERED RESPIRATORY (INHALATION) at 08:35

## 2025-04-26 RX ADMIN — Medication 100 MILLIGRAM(S): at 21:35

## 2025-04-26 RX ADMIN — HEPARIN SODIUM 5000 UNIT(S): 1000 INJECTION INTRAVENOUS; SUBCUTANEOUS at 14:43

## 2025-04-26 RX ADMIN — Medication 500 MILLIGRAM(S): at 21:35

## 2025-04-26 RX ADMIN — Medication 81 MILLIGRAM(S): at 11:56

## 2025-04-26 RX ADMIN — IPRATROPIUM BROMIDE AND ALBUTEROL SULFATE 3 MILLILITER(S): .5; 2.5 SOLUTION RESPIRATORY (INHALATION) at 08:35

## 2025-04-26 RX ADMIN — DEXTROMETHORPHAN HBR, GUAIFENESIN 10 MILLILITER(S): 20; 200 SOLUTION ORAL at 14:37

## 2025-04-26 RX ADMIN — Medication 500 MILLIGRAM(S): at 04:42

## 2025-04-26 RX ADMIN — Medication 60 MILLIGRAM(S): at 04:41

## 2025-04-26 RX ADMIN — IPRATROPIUM BROMIDE AND ALBUTEROL SULFATE 3 MILLILITER(S): .5; 2.5 SOLUTION RESPIRATORY (INHALATION) at 03:35

## 2025-04-26 RX ADMIN — CEFTRIAXONE 100 MILLIGRAM(S): 500 INJECTION, POWDER, FOR SOLUTION INTRAMUSCULAR; INTRAVENOUS at 19:33

## 2025-04-26 RX ADMIN — Medication 250 MILLIGRAM(S): at 19:33

## 2025-04-26 RX ADMIN — TAMSULOSIN HYDROCHLORIDE 0.4 MILLIGRAM(S): 0.4 CAPSULE ORAL at 21:38

## 2025-04-26 RX ADMIN — Medication 220 MILLIGRAM(S): at 11:56

## 2025-04-26 RX ADMIN — HEPARIN SODIUM 5000 UNIT(S): 1000 INJECTION INTRAVENOUS; SUBCUTANEOUS at 04:55

## 2025-04-26 RX ADMIN — Medication 75 MILLILITER(S): at 14:42

## 2025-04-26 NOTE — CONSULT NOTE ADULT - SUBJECTIVE AND OBJECTIVE BOX
NEPHROLOGY CONSULTATION NOTE    Patient is an 88 y/o M with PMH COPD not on home O2, current smoker, HLD, [place], who was sent in by PMD after blood work showed an elevated Creatinine level. Patient states a several week history of a productive cough associated with rhinorrhea and weakness that's been progressively worsening. Was seen by PMD for his symptoms, had blood work done which showed DMITRIY prompting ER visit. Patient denies SOB, fever, chills, n/v/d, urinary or bowel complaints.     PAST MEDICAL & SURGICAL HISTORY:  Advanced COPD      H/O: HTN (hypertension)      [place] (hard of hearing)      History of intraocular lens implant  LEFT EYE        Allergies:  No Known Allergies    Home Medications Reviewed  Hospital Medications:   MEDICATIONS  (STANDING):  albuterol/ipratropium for Nebulization 3 milliLiter(s) Nebulizer every 6 hours  ascorbic acid 500 milliGRAM(s) Oral every 8 hours  aspirin enteric coated 81 milliGRAM(s) Oral daily  atorvastatin 80 milliGRAM(s) Oral at bedtime  azithromycin  IVPB 500 milliGRAM(s) IV Intermittent every 24 hours  benzonatate 100 milliGRAM(s) Oral every 8 hours  cefTRIAXone   IVPB 1000 milliGRAM(s) IV Intermittent every 24 hours  chlorhexidine 2% Cloths 1 Application(s) Topical once  guaifenesin/dextromethorphan Oral Liquid 10 milliLiter(s) Oral every 8 hours  heparin   Injectable 5000 Unit(s) SubCutaneous every 8 hours  mineral oil enema 133 milliLiter(s) Rectal once  NIFEdipine XL 60 milliGRAM(s) Oral daily  sodium chloride 0.9%. 1000 milliLiter(s) (75 mL/Hr) IV Continuous <Continuous>  sodium chloride 3%  Inhalation 4 milliLiter(s) Inhalation every 12 hours  sodium zirconium cyclosilicate 10 Gram(s) Oral once  tamsulosin 0.4 milliGRAM(s) Oral at bedtime  tiotropium 2.5 MICROgram(s) Inhaler 2 Puff(s) Inhalation daily  zinc sulfate 220 milliGRAM(s) Oral daily      SOCIAL HISTORY:  Denies ETOH,Smoking,   FAMILY HISTORY:        REVIEW OF SYSTEMS:  CONSTITUTIONAL: No weakness, fevers or chills  EYES/ENT: No visual changes;  No vertigo or throat pain   NECK: No pain or stiffness  RESPIRATORY: No cough, wheezing, hemoptysis; No shortness of breath  CARDIOVASCULAR: No chest pain or palpitations.  GASTROINTESTINAL: No abdominal or epigastric pain. No nausea, vomiting, or hematemesis; No diarrhea or constipation. No melena or hematochezia.  GENITOURINARY: No dysuria, frequency, foamy urine, urinary urgency, incontinence or hematuria  NEUROLOGICAL: No numbness or weakness  SKIN: No itching, burning, rashes, or lesions   VASCULAR: No bilateral lower extremity edema.   All other review of systems is negative unless indicated above.    VITALS:  T(F): 97.8 (25 @ 04:36), Max: 97.8 (25 @ 04:36)  HR: 80 (25 @ :36)  BP: 172/91 (25 @ :36)  RR: 18 (25 @ :36)  SpO2: 92% (25 @ :36)    Height (cm): 162.6 ( @ 20:57)  Weight (kg): 51.9 ( @ 20:57)  BMI (kg/m2): 19.6 ( @ 20:57)  BSA (m2): 1.54 ( @ 20:57)    I&O's Detail        PHYSICAL EXAM:  Constitutional: NAD  HEENT: anicteric sclera, oropharynx clear, MMM  Neck: No JVD  Respiratory: CTAB, no wheezes, rales or rhonchi  Cardiovascular: S1, S2, RRR  Gastrointestinal: BS+, soft, NT/ND  Extremities: No cyanosis or clubbing. No peripheral edema  Neurological: A/O x 3, no focal deficits  Psychiatric: Normal mood, normal affect  : No CVA tenderness. No hernández.   Skin: No rashes  Vascular Access:    LABS:      141  |  106  |  55[H]  ----------------------------<  91  5.5[H]   |  24  |  3.8[H]    Ca    8.1[L]      2025 18:45    TPro  7.4  /  Alb  3.7  /  TBili  0.4  /  DBili      /  AST  16  /  ALT  16  /  AlkPhos  123[H]      Creatinine Trend: 3.8 <--, 4.1 <--                        15.0   7.41  )-----------( 288      ( 2025 17:00 )             49.2     Urine Studies:  Urinalysis Basic - ( 2025 18:50 )    Color: Yellow / Appearance: Clear / S.017 / pH:   Gluc:  / Ketone: Negative mg/dL  / Bili: Negative / Urobili: 1.0 mg/dL   Blood:  / Protein: 30 mg/dL / Nitrite: Negative   Leuk Esterase: Negative / RBC: 0 /HPF / WBC 4 /HPF   Sq Epi:  / Non Sq Epi:  / Bacteria: Many /HPF      Sodium, Random Urine: 34.0 mmoL/L ( @ 18:21)  Creatinine, Random Urine: 161 mg/dL ( @ 18:21)  Protein/Creatinine Ratio Calculation: 0.4 Ratio ( @ 18:21)  Potassium, Random Urine: 22 mmol/L ( @ 18:21)  Osmolality, Random Urine: 447 mos/kg ( @ 17:45)            RADIOLOGY & ADDITIONAL STUDIES:    < from: CT Abdomen and Pelvis No Cont (25 @ 20:24) >  Bilateral upper and lower lobe scattered reticular and tree-in-bud   opacities. Findings can reflect early pneumonia versus infectious versus   inflammatory etiology.    ABDOMEN/PELVIS:  No CT evidence of hydronephrosis bilaterally.    < end of copied text >               NEPHROLOGY CONSULTATION NOTE    Patient is an 89 y/o M with PMH COPD not on home O2, current smoker, HLD, [place], who was sent in by PMD after blood work showed an elevated Creatinine level. Patient states a several week history of a productive cough associated with rhinorrhea and weakness that's been progressively worsening. Was seen by PMD for his symptoms, had blood work done which showed DMITRIY prompting ER visit. Patient denies SOB, fever, chills, n/v/d, urinary or bowel complaints.     Renal was called for DMITRIY on CKD. Baseline creat is ~ 3.0 in  (2.8-> 3.2), proteinuria . Pt was seen by me in 2019 for DMITRIY on CKD - ANCA, Hep B and C were neg.  crest 4-> 4.4 during the is admission.    PAST MEDICAL & SURGICAL HISTORY:  Advanced COPD      H/O: HTN (hypertension)      [place] (hard of hearing)      History of intraocular lens implant  LEFT EYE        Allergies:  No Known Allergies    Home Medications Reviewed  Hospital Medications:   MEDICATIONS  (STANDING):  albuterol/ipratropium for Nebulization 3 milliLiter(s) Nebulizer every 6 hours  ascorbic acid 500 milliGRAM(s) Oral every 8 hours  aspirin enteric coated 81 milliGRAM(s) Oral daily  atorvastatin 80 milliGRAM(s) Oral at bedtime  azithromycin  IVPB 500 milliGRAM(s) IV Intermittent every 24 hours  benzonatate 100 milliGRAM(s) Oral every 8 hours  cefTRIAXone   IVPB 1000 milliGRAM(s) IV Intermittent every 24 hours  chlorhexidine 2% Cloths 1 Application(s) Topical once  guaifenesin/dextromethorphan Oral Liquid 10 milliLiter(s) Oral every 8 hours  heparin   Injectable 5000 Unit(s) SubCutaneous every 8 hours  mineral oil enema 133 milliLiter(s) Rectal once  NIFEdipine XL 60 milliGRAM(s) Oral daily  sodium chloride 0.9%. 1000 milliLiter(s) (75 mL/Hr) IV Continuous <Continuous>  sodium chloride 3%  Inhalation 4 milliLiter(s) Inhalation every 12 hours  sodium zirconium cyclosilicate 10 Gram(s) Oral once  tamsulosin 0.4 milliGRAM(s) Oral at bedtime  tiotropium 2.5 MICROgram(s) Inhaler 2 Puff(s) Inhalation daily  zinc sulfate 220 milliGRAM(s) Oral daily      SOCIAL HISTORY:  Denies ETOH,Smoking,   FAMILY HISTORY:        REVIEW OF SYSTEMS:  CONSTITUTIONAL: No weakness, fevers or chills  EYES/ENT: No visual changes;  No vertigo or throat pain   NECK: No pain or stiffness  RESPIRATORY: No cough, wheezing, hemoptysis; Has shortness of breath  CARDIOVASCULAR: No chest pain or palpitations.  GASTROINTESTINAL: No abdominal or epigastric pain. No nausea, vomiting, or hematemesis;   GENITOURINARY: No dysuria, frequency, foamy urine, urinary urgency, incontinence or hematuria  VASCULAR: No bilateral lower extremity edema.   All other review of systems is negative unless indicated above.    VITALS:  T(F): 97.8 (25 @ 04:36), Max: 97.8 (25 @ 04:36)  HR: 80 (25 @ :36)  BP: 172/91 (25 @ 04:36)  RR: 18 (25 @ 04:36)  SpO2: 92% (25 @ 04:36)    Height (cm): 162.6 ( @ 20:57)  Weight (kg): 51.9 ( @ 20:57)  BMI (kg/m2): 19.6 ( @ 20:57)  BSA (m2): 1.54 ( @ 20:57)    I&O's Detail        PHYSICAL EXAM:  Constitutional: NAD  HEENT: anicteric sclera,   Neck: No JVD  Respiratory: CTA  Cardiovascular: S1, S2, RRR  Gastrointestinal: BS+, soft, NT/ND  Extremities: No peripheral edema  Neurological: A/O x 3,   Psychiatric: Normal mood, normal affect  : No CVA tenderness. No hernández.   Skin: No rashes  Vascular Access:    LABS:      141  |  106  |  55[H]  ----------------------------<  91  5.5[H]   |  24  |  3.8[H]    Ca    8.1[L]      2025 18:45    TPro  7.4  /  Alb  3.7  /  TBili  0.4  /  DBili      /  AST  16  /  ALT  16  /  AlkPhos  123[H]      Creatinine Trend: 3.8 <--, 4.1 <--                        15.0   7.41  )-----------( 288      ( 2025 17:00 )             49.2     Urine Studies:  Urinalysis Basic - ( 2025 18:50 )    Color: Yellow / Appearance: Clear / S.017 / pH:   Gluc:  / Ketone: Negative mg/dL  / Bili: Negative / Urobili: 1.0 mg/dL   Blood:  / Protein: 30 mg/dL / Nitrite: Negative   Leuk Esterase: Negative / RBC: 0 /HPF / WBC 4 /HPF   Sq Epi:  / Non Sq Epi:  / Bacteria: Many /HPF      Sodium, Random Urine: 34.0 mmoL/L ( @ 18:21)  Creatinine, Random Urine: 161 mg/dL ( @ 18:21)  Protein/Creatinine Ratio Calculation: 0.4 Ratio ( @ 18:21)  Potassium, Random Urine: 22 mmol/L ( @ 18:21)  Osmolality, Random Urine: 447 mos/kg ( @ 17:45)            RADIOLOGY & ADDITIONAL STUDIES:    < from: CT Abdomen and Pelvis No Cont (25 @ 20:24) >  Bilateral upper and lower lobe scattered reticular and tree-in-bud   opacities. Findings can reflect early pneumonia versus infectious versus   inflammatory etiology.    ABDOMEN/PELVIS:  No CT evidence of hydronephrosis bilaterally.    < end of copied text >

## 2025-04-26 NOTE — DIETITIAN INITIAL EVALUATION ADULT - PERTINENT MEDS FT
MEDICATIONS  (STANDING):  albuterol/ipratropium for Nebulization 3 milliLiter(s) Nebulizer every 6 hours  ascorbic acid 500 milliGRAM(s) Oral every 8 hours  aspirin enteric coated 81 milliGRAM(s) Oral daily  atorvastatin 80 milliGRAM(s) Oral at bedtime  azithromycin  IVPB 500 milliGRAM(s) IV Intermittent every 24 hours  benzonatate 100 milliGRAM(s) Oral every 8 hours  cefTRIAXone   IVPB 1000 milliGRAM(s) IV Intermittent every 24 hours  chlorhexidine 2% Cloths 1 Application(s) Topical once  guaifenesin/dextromethorphan Oral Liquid 10 milliLiter(s) Oral every 8 hours  heparin   Injectable 5000 Unit(s) SubCutaneous every 8 hours  mineral oil enema 133 milliLiter(s) Rectal once  NIFEdipine XL 60 milliGRAM(s) Oral daily  sodium chloride 0.9%. 1000 milliLiter(s) (75 mL/Hr) IV Continuous <Continuous>  sodium chloride 3%  Inhalation 4 milliLiter(s) Inhalation every 12 hours  sodium zirconium cyclosilicate 10 Gram(s) Oral once  tamsulosin 0.4 milliGRAM(s) Oral at bedtime  tiotropium 2.5 MICROgram(s) Inhaler 2 Puff(s) Inhalation daily  zinc sulfate 220 milliGRAM(s) Oral daily    MEDICATIONS  (PRN):  acetaminophen     Tablet .. 650 milliGRAM(s) Oral every 6 hours PRN Temp greater or equal to 38C (100.4F), Mild Pain (1 - 3)  aluminum hydroxide/magnesium hydroxide/simethicone Suspension 30 milliLiter(s) Oral every 4 hours PRN Dyspepsia  melatonin 3 milliGRAM(s) Oral at bedtime PRN Insomnia  ondansetron Injectable 4 milliGRAM(s) IV Push every 8 hours PRN Nausea and/or Vomiting

## 2025-04-26 NOTE — DIETITIAN INITIAL EVALUATION ADULT - NS FNS DIET ORDER
Diet, DASH/TLC:   Sodium & Cholesterol Restricted  For patients receiving Renal Replacement - No Protein Restr, No Conc K, No Conc Phos, Low  Sodium (RENAL) (04-26-25 @ 12:15)

## 2025-04-26 NOTE — CONSULT NOTE ADULT - ASSESSMENT
Patient is an 89 y/o M with PMH COPD not on home O2, current smoker, HLD, [place], who was sent in by PMD after blood work showed an elevated Creatinine level.     Creat 4.1-> 3.8  K 5.5  CTAP - no hydro    - start Lokelma 10 grams daily  -renal diet Patient is an 89 y/o M with PMH COPD not on home O2, current smoker, HLD, HTN, [place], who was sent in by PMD after blood work showed an elevated Creatinine level.     Creat 4.1-> 3.8-> 4.4  K 5.5  CTAP - no hydro  Kidney sono (4/2025) small kidney s 7.2/7.4 cm no hydro, increased echogenicity, Bladder not checked  UO not documented      Advanced stage 5 kidney disease  - small kidneys b/l  possible pre-renal component - creat was ~ 3.0 in 2024  ANCA  Hep B and C were neg in 2019  etiology likely HTN    - check Bladder sono for PVR if >250 cc, place aFoley  - noninvasive condom cath for UO - non  documented  -if po intake is poor, may start 1/2 NS 50 cc/hr  - start Lokelma 10 grams daily  -renal diet  check phos, iPTH  HTN - cont Nifedipine, avoid hypotension  COPD on empiric Abx  no need for RRT

## 2025-04-26 NOTE — PROGRESS NOTE ADULT - SUBJECTIVE AND OBJECTIVE BOX
Patient is a 89y old  Male who presents with a chief complaint of  DMITRIY      MEDICATIONS  (STANDING):  albuterol/ipratropium for Nebulization 3 milliLiter(s) Nebulizer every 6 hours  ascorbic acid 500 milliGRAM(s) Oral every 8 hours  aspirin enteric coated 81 milliGRAM(s) Oral daily  atorvastatin 80 milliGRAM(s) Oral at bedtime  azithromycin  IVPB 500 milliGRAM(s) IV Intermittent every 24 hours  benzonatate 100 milliGRAM(s) Oral every 8 hours  cefTRIAXone   IVPB 1000 milliGRAM(s) IV Intermittent every 24 hours  chlorhexidine 2% Cloths 1 Application(s) Topical once  guaifenesin/dextromethorphan Oral Liquid 10 milliLiter(s) Oral every 8 hours  heparin   Injectable 5000 Unit(s) SubCutaneous every 8 hours  mineral oil enema 133 milliLiter(s) Rectal once  NIFEdipine XL 60 milliGRAM(s) Oral daily  sodium chloride 0.9%. 1000 milliLiter(s) (75 mL/Hr) IV Continuous <Continuous>  sodium chloride 3%  Inhalation 4 milliLiter(s) Inhalation every 12 hours  sodium zirconium cyclosilicate 10 Gram(s) Oral once  tamsulosin 0.4 milliGRAM(s) Oral at bedtime  tiotropium 2.5 MICROgram(s) Inhaler 2 Puff(s) Inhalation daily  zinc sulfate 220 milliGRAM(s) Oral daily    MEDICATIONS  (PRN):  acetaminophen     Tablet .. 650 milliGRAM(s) Oral every 6 hours PRN Temp greater or equal to 38C (100.4F), Mild Pain (1 - 3)  aluminum hydroxide/magnesium hydroxide/simethicone Suspension 30 milliLiter(s) Oral every 4 hours PRN Dyspepsia  melatonin 3 milliGRAM(s) Oral at bedtime PRN Insomnia  ondansetron Injectable 4 milliGRAM(s) IV Push every 8 hours PRN Nausea and/or Vomiting      CAPILLARY BLOOD GLUCOS  I&O's Summary      PHYSICAL EXAM:  Vital Signs Last 24 Hrs  T(C): 36.6 (2025 04:36), Max: 36.6 (2025 04:36)  T(F): 97.8 (2025 04:36), Max: 97.8 (2025 04:36)  HR: 80 (2025 04:36) (69 - 92)  BP: 172/91 (2025 04:36) (110/71 - 172/91)  BP(mean): --  RR: 18 (2025 09:09) (18 - 19)  SpO2: 95% (2025 09:09) (92% - 96%)    Parameters below as of 2025 09:09  Patient On (Oxygen Delivery Method): room air      GENERAL: No acute distress, frail appearing, presbycusis   HEAD:  Atraumatic, Normocephalic  EYES:conjunctiva and sclera clear  NECK: Supple, no lymphadenopathy, no JVD  CHEST/LUNG: Congested, no wheezing, + productive cough   HEART: Regular rate and rhythm; No murmurs, rubs, or gallops  ABDOMEN: Soft, non-tender, non-distended  EXTREMITIES:   No clubbing, cyanosis, or edema  NEUROLOGY: A&O x 3, no focal deficits  SKIN: No rashes or lesions    LABS:                        12.4   7.34  )-----------( 259      ( 2025 07:58 )             40.1         141  |  106  |  55[H]  ----------------------------<  91  5.5[H]   |  24  |  3.8[H]    Ca    8.1[L]      2025 18:45    TPro  7.4  /  Alb  3.7  /  TBili  0.4  /  DBili  x   /  AST  16  /  ALT  16  /  AlkPhos  123[H]        Urinalysis Basic - ( 2025 18:50 )    Color: Yellow / Appearance: Clear / S.017 / pH: x  Gluc: x / Ketone: Negative mg/dL  / Bili: Negative / Urobili: 1.0 mg/dL   Blood: x / Protein: 30 mg/dL / Nitrite: Negative   Leuk Esterase: Negative / RBC: 0 /HPF / WBC 4 /HPF   Sq Epi: x / Non Sq Epi: x / Bacteria: Many /HPF        Urinalysis with Rflx Culture (collected 2025 18:50)

## 2025-04-26 NOTE — DIETITIAN INITIAL EVALUATION ADULT - OTHER INFO
pt is 89 year old male with hx of COPD, + smoker, [place] sent by MD with elevated creatine. pt admitted with PNA, DMITRIY/CKD, renal diet as per nephrology.

## 2025-04-26 NOTE — DIETITIAN INITIAL EVALUATION ADULT - PERTINENT LABORATORY DATA
04-26    140  |  107  |  67[HH]  ----------------------------<  102[H]  4.7   |  22  |  4.4[HH]    Ca    7.5[L]      26 Apr 2025 07:58  Phos  3.2     04-26  Mg     1.8     04-26    TPro  5.5[L]  /  Alb  2.9[L]  /  TBili  0.2  /  DBili  x   /  AST  10  /  ALT  10  /  AlkPhos  99  04-26                          12.4   7.34  )-----------( 259      ( 26 Apr 2025 07:58 )             40.1

## 2025-04-26 NOTE — DIETITIAN INITIAL EVALUATION ADULT - ORAL INTAKE PTA/DIET HISTORY
as per pt consumes a regular diet PTA with good po intake. NKFA or intolerances. pt denies any recent weight changes      presently on a DASH/TLC renal diet tolerating well consumes 75% of meals

## 2025-04-27 LAB
ANION GAP SERPL CALC-SCNC: 13 MMOL/L — SIGNIFICANT CHANGE UP (ref 7–14)
BUN SERPL-MCNC: 59 MG/DL — HIGH (ref 10–20)
CALCIUM SERPL-MCNC: 7.5 MG/DL — LOW (ref 8.4–10.5)
CHLORIDE SERPL-SCNC: 109 MMOL/L — SIGNIFICANT CHANGE UP (ref 98–110)
CO2 SERPL-SCNC: 21 MMOL/L — SIGNIFICANT CHANGE UP (ref 17–32)
CREAT SERPL-MCNC: 3.7 MG/DL — HIGH (ref 0.7–1.5)
EGFR: 15 ML/MIN/1.73M2 — LOW
EGFR: 15 ML/MIN/1.73M2 — LOW
GLUCOSE SERPL-MCNC: 95 MG/DL — SIGNIFICANT CHANGE UP (ref 70–99)
POTASSIUM SERPL-MCNC: 4.7 MMOL/L — SIGNIFICANT CHANGE UP (ref 3.5–5)
POTASSIUM SERPL-SCNC: 4.7 MMOL/L — SIGNIFICANT CHANGE UP (ref 3.5–5)
SODIUM SERPL-SCNC: 143 MMOL/L — SIGNIFICANT CHANGE UP (ref 135–146)

## 2025-04-27 PROCEDURE — 99232 SBSQ HOSP IP/OBS MODERATE 35: CPT

## 2025-04-27 RX ADMIN — HEPARIN SODIUM 5000 UNIT(S): 1000 INJECTION INTRAVENOUS; SUBCUTANEOUS at 14:35

## 2025-04-27 RX ADMIN — Medication 500 MILLIGRAM(S): at 21:49

## 2025-04-27 RX ADMIN — Medication 100 MILLIGRAM(S): at 21:49

## 2025-04-27 RX ADMIN — ATORVASTATIN CALCIUM 80 MILLIGRAM(S): 80 TABLET, FILM COATED ORAL at 21:49

## 2025-04-27 RX ADMIN — Medication 75 MILLILITER(S): at 11:04

## 2025-04-27 RX ADMIN — DEXTROMETHORPHAN HBR, GUAIFENESIN 10 MILLILITER(S): 20; 200 SOLUTION ORAL at 21:48

## 2025-04-27 RX ADMIN — Medication 81 MILLIGRAM(S): at 11:04

## 2025-04-27 RX ADMIN — Medication 500 MILLIGRAM(S): at 14:35

## 2025-04-27 RX ADMIN — IPRATROPIUM BROMIDE AND ALBUTEROL SULFATE 3 MILLILITER(S): .5; 2.5 SOLUTION RESPIRATORY (INHALATION) at 07:34

## 2025-04-27 RX ADMIN — HEPARIN SODIUM 5000 UNIT(S): 1000 INJECTION INTRAVENOUS; SUBCUTANEOUS at 21:48

## 2025-04-27 RX ADMIN — HEPARIN SODIUM 5000 UNIT(S): 1000 INJECTION INTRAVENOUS; SUBCUTANEOUS at 05:40

## 2025-04-27 RX ADMIN — IPRATROPIUM BROMIDE AND ALBUTEROL SULFATE 3 MILLILITER(S): .5; 2.5 SOLUTION RESPIRATORY (INHALATION) at 14:08

## 2025-04-27 RX ADMIN — TIOTROPIUM BROMIDE INHALATION SPRAY 2 PUFF(S): 3.12 SPRAY, METERED RESPIRATORY (INHALATION) at 07:34

## 2025-04-27 RX ADMIN — Medication 100 MILLIGRAM(S): at 14:35

## 2025-04-27 RX ADMIN — CEFTRIAXONE 100 MILLIGRAM(S): 500 INJECTION, POWDER, FOR SOLUTION INTRAMUSCULAR; INTRAVENOUS at 21:51

## 2025-04-27 RX ADMIN — Medication 220 MILLIGRAM(S): at 11:05

## 2025-04-27 RX ADMIN — Medication 50 MILLILITER(S): at 14:34

## 2025-04-27 RX ADMIN — IPRATROPIUM BROMIDE AND ALBUTEROL SULFATE 3 MILLILITER(S): .5; 2.5 SOLUTION RESPIRATORY (INHALATION) at 03:07

## 2025-04-27 RX ADMIN — Medication 4 MILLILITER(S): at 20:09

## 2025-04-27 RX ADMIN — Medication 250 MILLIGRAM(S): at 21:52

## 2025-04-27 RX ADMIN — Medication 500 MILLIGRAM(S): at 05:40

## 2025-04-27 RX ADMIN — TAMSULOSIN HYDROCHLORIDE 0.4 MILLIGRAM(S): 0.4 CAPSULE ORAL at 21:50

## 2025-04-27 RX ADMIN — DEXTROMETHORPHAN HBR, GUAIFENESIN 10 MILLILITER(S): 20; 200 SOLUTION ORAL at 14:35

## 2025-04-27 RX ADMIN — DEXTROMETHORPHAN HBR, GUAIFENESIN 10 MILLILITER(S): 20; 200 SOLUTION ORAL at 05:40

## 2025-04-27 RX ADMIN — Medication 100 MILLIGRAM(S): at 05:40

## 2025-04-27 RX ADMIN — IPRATROPIUM BROMIDE AND ALBUTEROL SULFATE 3 MILLILITER(S): .5; 2.5 SOLUTION RESPIRATORY (INHALATION) at 20:10

## 2025-04-27 RX ADMIN — Medication 4 MILLILITER(S): at 07:35

## 2025-04-27 NOTE — PROGRESS NOTE ADULT - SUBJECTIVE AND OBJECTIVE BOX
Nephrology progress note    Patient was seen and examined, events over the last 24 h noted .    Allergies:  No Known Allergies    Hospital Medications:   MEDICATIONS  (STANDING):  albuterol/ipratropium for Nebulization 3 milliLiter(s) Nebulizer every 6 hours  ascorbic acid 500 milliGRAM(s) Oral every 8 hours  aspirin enteric coated 81 milliGRAM(s) Oral daily  atorvastatin 80 milliGRAM(s) Oral at bedtime  azithromycin  IVPB 500 milliGRAM(s) IV Intermittent every 24 hours  benzonatate 100 milliGRAM(s) Oral every 8 hours  cefTRIAXone   IVPB 1000 milliGRAM(s) IV Intermittent every 24 hours  chlorhexidine 2% Cloths 1 Application(s) Topical once  guaifenesin/dextromethorphan Oral Liquid 10 milliLiter(s) Oral every 8 hours  heparin   Injectable 5000 Unit(s) SubCutaneous every 8 hours  mineral oil enema 133 milliLiter(s) Rectal once  NIFEdipine XL 60 milliGRAM(s) Oral daily  sodium chloride 0.9%. 1000 milliLiter(s) (50 mL/Hr) IV Continuous <Continuous>  sodium chloride 3%  Inhalation 4 milliLiter(s) Inhalation every 12 hours  sodium zirconium cyclosilicate 10 Gram(s) Oral once  tamsulosin 0.4 milliGRAM(s) Oral at bedtime  tiotropium 2.5 MICROgram(s) Inhaler 2 Puff(s) Inhalation daily  zinc sulfate 220 milliGRAM(s) Oral daily        VITALS:  T(F): 98.1 (04-27-25 @ 14:11), Max: 98.1 (04-27-25 @ 05:04)  HR: 86 (04-27-25 @ 14:11)  BP: 112/57 (04-27-25 @ 14:11)  RR: 19 (04-27-25 @ 14:11)  SpO2: 96% (04-27-25 @ 14:11)  Wt(kg): --        PHYSICAL EXAM:  Constitutional: NAD  HEENT: anicteric sclera, oropharynx clear, MMM  Neck: No JVD  Respiratory: CTAB, no wheezes, rales or rhonchi  Cardiovascular: S1, S2, RRR  Gastrointestinal: BS+, soft, NT/ND  Extremities: No cyanosis or clubbing. No peripheral edema  :  No hernández.   Skin: No rashes    LABS:  04-27    143  |  109  |  59[H]  ----------------------------<  95  4.7   |  21  |  3.7[H]    Ca    7.5[L]      27 Apr 2025 07:41  Phos  3.2     04-26  Mg     1.8     04-26    TPro  5.5[L]  /  Alb  2.9[L]  /  TBili  0.2  /  DBili      /  AST  10  /  ALT  10  /  AlkPhos  99  04-26                          12.4   7.34  )-----------( 259      ( 26 Apr 2025 07:58 )             40.1       Urine Studies:  Urinalysis Basic - ( 27 Apr 2025 07:41 )    Color:  / Appearance:  / SG:  / pH:   Gluc: 95 mg/dL / Ketone:   / Bili:  / Urobili:    Blood:  / Protein:  / Nitrite:    Leuk Esterase:  / RBC:  / WBC    Sq Epi:  / Non Sq Epi:  / Bacteria:       Sodium, Random Urine: 34.0 mmoL/L (04-25 @ 18:21)  Creatinine, Random Urine: 161 mg/dL (04-25 @ 18:21)  Protein/Creatinine Ratio Calculation: 0.4 Ratio (04-25 @ 18:21)  Potassium, Random Urine: 22 mmol/L (04-25 @ 18:21)  Osmolality, Random Urine: 447 mos/kg (04-25 @ 17:45)    RADIOLOGY & ADDITIONAL STUDIES:

## 2025-04-27 NOTE — PROGRESS NOTE ADULT - SUBJECTIVE AND OBJECTIVE BOX
Patient is a 89y old  Male who presents with a chief complaint of DMITRIY (26 Apr 2025 16:57)        MEDICATIONS  (STANDING):  albuterol/ipratropium for Nebulization 3 milliLiter(s) Nebulizer every 6 hours  ascorbic acid 500 milliGRAM(s) Oral every 8 hours  aspirin enteric coated 81 milliGRAM(s) Oral daily  atorvastatin 80 milliGRAM(s) Oral at bedtime  azithromycin  IVPB 500 milliGRAM(s) IV Intermittent every 24 hours  benzonatate 100 milliGRAM(s) Oral every 8 hours  cefTRIAXone   IVPB 1000 milliGRAM(s) IV Intermittent every 24 hours  chlorhexidine 2% Cloths 1 Application(s) Topical once  guaifenesin/dextromethorphan Oral Liquid 10 milliLiter(s) Oral every 8 hours  heparin   Injectable 5000 Unit(s) SubCutaneous every 8 hours  mineral oil enema 133 milliLiter(s) Rectal once  NIFEdipine XL 60 milliGRAM(s) Oral daily  sodium chloride 0.9%. 1000 milliLiter(s) (50 mL/Hr) IV Continuous <Continuous>  sodium chloride 3%  Inhalation 4 milliLiter(s) Inhalation every 12 hours  sodium zirconium cyclosilicate 10 Gram(s) Oral once  tamsulosin 0.4 milliGRAM(s) Oral at bedtime  tiotropium 2.5 MICROgram(s) Inhaler 2 Puff(s) Inhalation daily  zinc sulfate 220 milliGRAM(s) Oral daily    MEDICATIONS  (PRN):  acetaminophen     Tablet .. 650 milliGRAM(s) Oral every 6 hours PRN Temp greater or equal to 38C (100.4F), Mild Pain (1 - 3)  aluminum hydroxide/magnesium hydroxide/simethicone Suspension 30 milliLiter(s) Oral every 4 hours PRN Dyspepsia  melatonin 3 milliGRAM(s) Oral at bedtime PRN Insomnia  ondansetron Injectable 4 milliGRAM(s) IV Push every 8 hours PRN Nausea and/or Vomiting      CAPILLARY BLOOD GLUCOSE  I&O's Summary      PHYSICAL EXAM:  Vital Signs Last 24 Hrs  T(C): 36.7 (27 Apr 2025 05:04), Max: 36.7 (27 Apr 2025 05:04)  T(F): 98.1 (27 Apr 2025 05:04), Max: 98.1 (27 Apr 2025 05:04)  HR: 85 (27 Apr 2025 05:04) (79 - 89)  BP: 105/63 (27 Apr 2025 05:04) (101/61 - 115/69)  BP(mean): --  RR: 19 (27 Apr 2025 05:04) (18 - 20)  SpO2: 92% (27 Apr 2025 05:04) (92% - 96%)    Parameters below as of 26 Apr 2025 20:54    O2 Concentration (%): 94      GENERAL: No acute distress, frail appearing, presbycusis   HEAD:  Atraumatic, Normocephalic  EYES:conjunctiva and sclera clear  NECK: Supple, no lymphadenopathy, no JVD  CHEST/LUNG: Congested, no wheezing, + productive cough improving   HEART: Regular rate and rhythm; No murmurs, rubs, or gallops  ABDOMEN: Soft, non-tender, non-distended  EXTREMITIES:   No clubbing, cyanosis, or edema  NEUROLOGY: A&O x 3, no focal deficits  SKIN: No rashes or lesions      LABS:                        12.4   7.34  )-----------( 259      ( 26 Apr 2025 07:58 )             40.1     04-27    143  |  109  |  59[H]  ----------------------------<  95  4.7   |  21  |  3.7[H]    Ca    7.5[L]      27 Apr 2025 07:41  Phos  3.2     04-26  Mg     1.8     04-26    TPro  5.5[L]  /  Alb  2.9[L]  /  TBili  0.2  /  DBili  x   /  AST  10  /  ALT  10  /  AlkPhos  99  04-26          Urinalysis Basic - ( 27 Apr 2025 07:41 )    Color: x / Appearance: x / SG: x / pH: x  Gluc: 95 mg/dL / Ketone: x  / Bili: x / Urobili: x   Blood: x / Protein: x / Nitrite: x   Leuk Esterase: x / RBC: x / WBC x   Sq Epi: x / Non Sq Epi: x / Bacteria:       Urinalysis with Rflx Culture (collected 25 Apr 2025 18:50)

## 2025-04-28 ENCOUNTER — TRANSCRIPTION ENCOUNTER (OUTPATIENT)
Age: 89
End: 2025-04-28

## 2025-04-28 LAB
ANION GAP SERPL CALC-SCNC: 10 MMOL/L — SIGNIFICANT CHANGE UP (ref 7–14)
BUN SERPL-MCNC: 49 MG/DL — HIGH (ref 10–20)
CALCIUM SERPL-MCNC: 7.9 MG/DL — LOW (ref 8.4–10.5)
CHLORIDE SERPL-SCNC: 112 MMOL/L — HIGH (ref 98–110)
CO2 SERPL-SCNC: 22 MMOL/L — SIGNIFICANT CHANGE UP (ref 17–32)
CREAT SERPL-MCNC: 3.2 MG/DL — HIGH (ref 0.7–1.5)
EGFR: 18 ML/MIN/1.73M2 — LOW
EGFR: 18 ML/MIN/1.73M2 — LOW
GLUCOSE SERPL-MCNC: 79 MG/DL — SIGNIFICANT CHANGE UP (ref 70–99)
POTASSIUM SERPL-MCNC: 5.1 MMOL/L — HIGH (ref 3.5–5)
POTASSIUM SERPL-SCNC: 5.1 MMOL/L — HIGH (ref 3.5–5)
SODIUM SERPL-SCNC: 144 MMOL/L — SIGNIFICANT CHANGE UP (ref 135–146)

## 2025-04-28 PROCEDURE — 99232 SBSQ HOSP IP/OBS MODERATE 35: CPT

## 2025-04-28 RX ORDER — ZINC SULFATE 50(220)MG
1 CAPSULE ORAL
Qty: 30 | Refills: 0
Start: 2025-04-28 | End: 2025-05-27

## 2025-04-28 RX ORDER — DEXTROMETHORPHAN HBR, GUAIFENESIN 20; 200 MG/10ML; MG/10ML
10 SOLUTION ORAL
Qty: 90 | Refills: 0
Start: 2025-04-28 | End: 2025-04-30

## 2025-04-28 RX ORDER — AZITHROMYCIN 250 MG
1 CAPSULE ORAL
Qty: 1 | Refills: 0
Start: 2025-04-28 | End: 2025-04-28

## 2025-04-28 RX ORDER — SODIUM ZIRCONIUM CYCLOSILICATE 5 G/5G
10 POWDER, FOR SUSPENSION ORAL ONCE
Refills: 0 | Status: COMPLETED | OUTPATIENT
Start: 2025-04-28 | End: 2025-04-28

## 2025-04-28 RX ORDER — BENZONATATE 100 MG
1 CAPSULE ORAL
Qty: 15 | Refills: 0
Start: 2025-04-28 | End: 2025-05-02

## 2025-04-28 RX ORDER — CEFPODOXIME PROXETIL 200 MG/1
1 TABLET, FILM COATED ORAL
Qty: 4 | Refills: 0
Start: 2025-04-28 | End: 2025-04-29

## 2025-04-28 RX ADMIN — IPRATROPIUM BROMIDE AND ALBUTEROL SULFATE 3 MILLILITER(S): .5; 2.5 SOLUTION RESPIRATORY (INHALATION) at 13:18

## 2025-04-28 RX ADMIN — Medication 100 MILLIGRAM(S): at 05:00

## 2025-04-28 RX ADMIN — Medication 500 MILLIGRAM(S): at 14:06

## 2025-04-28 RX ADMIN — Medication 500 MILLIGRAM(S): at 21:11

## 2025-04-28 RX ADMIN — DEXTROMETHORPHAN HBR, GUAIFENESIN 10 MILLILITER(S): 20; 200 SOLUTION ORAL at 14:07

## 2025-04-28 RX ADMIN — Medication 4 MILLILITER(S): at 07:38

## 2025-04-28 RX ADMIN — Medication 220 MILLIGRAM(S): at 14:07

## 2025-04-28 RX ADMIN — HEPARIN SODIUM 5000 UNIT(S): 1000 INJECTION INTRAVENOUS; SUBCUTANEOUS at 21:11

## 2025-04-28 RX ADMIN — DEXTROMETHORPHAN HBR, GUAIFENESIN 10 MILLILITER(S): 20; 200 SOLUTION ORAL at 05:01

## 2025-04-28 RX ADMIN — IPRATROPIUM BROMIDE AND ALBUTEROL SULFATE 3 MILLILITER(S): .5; 2.5 SOLUTION RESPIRATORY (INHALATION) at 02:25

## 2025-04-28 RX ADMIN — Medication 500 MILLIGRAM(S): at 05:00

## 2025-04-28 RX ADMIN — CEFTRIAXONE 100 MILLIGRAM(S): 500 INJECTION, POWDER, FOR SOLUTION INTRAMUSCULAR; INTRAVENOUS at 21:11

## 2025-04-28 RX ADMIN — TIOTROPIUM BROMIDE INHALATION SPRAY 2 PUFF(S): 3.12 SPRAY, METERED RESPIRATORY (INHALATION) at 07:39

## 2025-04-28 RX ADMIN — TAMSULOSIN HYDROCHLORIDE 0.4 MILLIGRAM(S): 0.4 CAPSULE ORAL at 21:12

## 2025-04-28 RX ADMIN — IPRATROPIUM BROMIDE AND ALBUTEROL SULFATE 3 MILLILITER(S): .5; 2.5 SOLUTION RESPIRATORY (INHALATION) at 20:15

## 2025-04-28 RX ADMIN — Medication 250 MILLIGRAM(S): at 14:07

## 2025-04-28 RX ADMIN — Medication 4 MILLILITER(S): at 20:16

## 2025-04-28 RX ADMIN — IPRATROPIUM BROMIDE AND ALBUTEROL SULFATE 3 MILLILITER(S): .5; 2.5 SOLUTION RESPIRATORY (INHALATION) at 07:37

## 2025-04-28 RX ADMIN — Medication 60 MILLIGRAM(S): at 05:00

## 2025-04-28 RX ADMIN — SODIUM ZIRCONIUM CYCLOSILICATE 10 GRAM(S): 5 POWDER, FOR SUSPENSION ORAL at 14:08

## 2025-04-28 RX ADMIN — HEPARIN SODIUM 5000 UNIT(S): 1000 INJECTION INTRAVENOUS; SUBCUTANEOUS at 17:26

## 2025-04-28 RX ADMIN — Medication 100 MILLIGRAM(S): at 14:07

## 2025-04-28 RX ADMIN — ATORVASTATIN CALCIUM 80 MILLIGRAM(S): 80 TABLET, FILM COATED ORAL at 21:11

## 2025-04-28 RX ADMIN — Medication 81 MILLIGRAM(S): at 14:06

## 2025-04-28 RX ADMIN — Medication 100 MILLIGRAM(S): at 21:11

## 2025-04-28 RX ADMIN — DEXTROMETHORPHAN HBR, GUAIFENESIN 10 MILLILITER(S): 20; 200 SOLUTION ORAL at 21:11

## 2025-04-28 RX ADMIN — HEPARIN SODIUM 5000 UNIT(S): 1000 INJECTION INTRAVENOUS; SUBCUTANEOUS at 05:00

## 2025-04-28 NOTE — PROGRESS NOTE ADULT - SUBJECTIVE AND OBJECTIVE BOX
Patient is a 89y old  Male who presents with a chief complaint of DMITRIY (27 Apr 2025 13:15)      MEDICATIONS  (STANDING):  albuterol/ipratropium for Nebulization 3 milliLiter(s) Nebulizer every 6 hours  ascorbic acid 500 milliGRAM(s) Oral every 8 hours  aspirin enteric coated 81 milliGRAM(s) Oral daily  atorvastatin 80 milliGRAM(s) Oral at bedtime  azithromycin  IVPB 500 milliGRAM(s) IV Intermittent every 24 hours  benzonatate 100 milliGRAM(s) Oral every 8 hours  cefTRIAXone   IVPB 1000 milliGRAM(s) IV Intermittent every 24 hours  guaifenesin/dextromethorphan Oral Liquid 10 milliLiter(s) Oral every 8 hours  heparin   Injectable 5000 Unit(s) SubCutaneous every 8 hours  mineral oil enema 133 milliLiter(s) Rectal once  NIFEdipine XL 60 milliGRAM(s) Oral daily  sodium chloride 3%  Inhalation 4 milliLiter(s) Inhalation every 12 hours  sodium zirconium cyclosilicate 10 Gram(s) Oral once  tamsulosin 0.4 milliGRAM(s) Oral at bedtime  tiotropium 2.5 MICROgram(s) Inhaler 2 Puff(s) Inhalation daily  zinc sulfate 220 milliGRAM(s) Oral daily    MEDICATIONS  (PRN):  acetaminophen     Tablet .. 650 milliGRAM(s) Oral every 6 hours PRN Temp greater or equal to 38C (100.4F), Mild Pain (1 - 3)  aluminum hydroxide/magnesium hydroxide/simethicone Suspension 30 milliLiter(s) Oral every 4 hours PRN Dyspepsia  melatonin 3 milliGRAM(s) Oral at bedtime PRN Insomnia  ondansetron Injectable 4 milliGRAM(s) IV Push every 8 hours PRN Nausea and/or Vomiting      CAPILLARY BLOOD GLUCOSE  I&O's Summary      PHYSICAL EXAM:  Vital Signs Last 24 Hrs  T(C): 37.2 (29 Apr 2025 04:52), Max: 37.2 (29 Apr 2025 04:52)  T(F): 98.9 (29 Apr 2025 04:52), Max: 98.9 (29 Apr 2025 04:52)  HR: 87 (29 Apr 2025 04:52) (87 - 105)  BP: 115/59 (29 Apr 2025 04:52) (115/59 - 126/69)  BP(mean): --  RR: 18 (29 Apr 2025 04:52) (18 - 19)  SpO2: 91% (29 Apr 2025 04:52) (91% - 96%)    Parameters below as of 28 Apr 2025 20:01  Patient On (Oxygen Delivery Method): room air      GENERAL: No acute distress, frail appearing, presbycusis   HEAD:  Atraumatic, Normocephalic  EYES:conjunctiva and sclera clear  NECK: Supple, no lymphadenopathy, no JVD  CHEST/LUNG: Congested, no wheezing, + productive cough improving   HEART: Regular rate and rhythm; No murmurs, rubs, or gallops  ABDOMEN: Soft, non-tender, non-distended  EXTREMITIES:   No clubbing, cyanosis, or edema  NEUROLOGY: A&O x 3, no focal deficits  SKIN: No rashes or lesions        LABS:    04-28    144  |  112[H]  |  49[H]  ----------------------------<  79  5.1[H]   |  22  |  3.2[H]    Ca    7.9[L]      28 Apr 2025 07:4        Urinalysis Basic - ( 28 Apr 2025 07:44 )    Color: x / Appearance: x / SG: x / pH: x  Gluc: 79 mg/dL / Ketone: x  / Bili: x / Urobili: x   Blood: x / Protein: x / Nitrite: x   Leuk Esterase: x / RBC: x / WBC x   Sq Epi: x / Non Sq Epi: x / Bacteria: x

## 2025-04-28 NOTE — PROGRESS NOTE ADULT - SUBJECTIVE AND OBJECTIVE BOX
Nephrology Progress Note    JARED ROBLES  MRN-[id number]  89y  Male    S:  Patient is seen and examined, events over the last 24h noted.    O:  Allergies:  No Known Allergies    Hospital Medications:   MEDICATIONS  (STANDING):  albuterol/ipratropium for Nebulization 3 milliLiter(s) Nebulizer every 6 hours  ascorbic acid 500 milliGRAM(s) Oral every 8 hours  aspirin enteric coated 81 milliGRAM(s) Oral daily  atorvastatin 80 milliGRAM(s) Oral at bedtime  azithromycin  IVPB 500 milliGRAM(s) IV Intermittent every 24 hours  benzonatate 100 milliGRAM(s) Oral every 8 hours  cefTRIAXone   IVPB 1000 milliGRAM(s) IV Intermittent every 24 hours  chlorhexidine 2% Cloths 1 Application(s) Topical once  guaifenesin/dextromethorphan Oral Liquid 10 milliLiter(s) Oral every 8 hours  heparin   Injectable 5000 Unit(s) SubCutaneous every 8 hours  mineral oil enema 133 milliLiter(s) Rectal once  NIFEdipine XL 60 milliGRAM(s) Oral daily  sodium chloride 3%  Inhalation 4 milliLiter(s) Inhalation every 12 hours  sodium zirconium cyclosilicate 10 Gram(s) Oral once  tamsulosin 0.4 milliGRAM(s) Oral at bedtime  tiotropium 2.5 MICROgram(s) Inhaler 2 Puff(s) Inhalation daily  zinc sulfate 220 milliGRAM(s) Oral daily    MEDICATIONS  (PRN):  acetaminophen     Tablet .. 650 milliGRAM(s) Oral every 6 hours PRN Temp greater or equal to 38C (100.4F), Mild Pain (1 - 3)  aluminum hydroxide/magnesium hydroxide/simethicone Suspension 30 milliLiter(s) Oral every 4 hours PRN Dyspepsia  melatonin 3 milliGRAM(s) Oral at bedtime PRN Insomnia  ondansetron Injectable 4 milliGRAM(s) IV Push every 8 hours PRN Nausea and/or Vomiting    Home Medications:      VITALS:  Daily     Daily   T(F): 98 (04-28-25 @ 20:01), Max: 98.1 (04-28-25 @ 04:56)  HR: 89 (04-28-25 @ 20:01)  BP: 116/67 (04-28-25 @ 20:01)  RR: 18 (04-28-25 @ 20:01)  SpO2: 93% (04-28-25 @ 20:01)  Wt(kg): --  I&O's Detail    I&O's Summary        PHYSICAL EXAM:  Gen: NAD  Chest: b/l breath sounds  Abd: soft  Extremities: no edema      LABS:      04-28    144  |  112[H]  |  49[H]  ----------------------------<  79  5.1[H]   |  22  |  3.2[H]    Ca    7.9[L]      28 Apr 2025 07:44    Phosphorus: 3.2 mg/dL (04-26-25 @ 07:58)  Phosphorus Level, Serum: 2.7 mg/dL (04-02-19 @ 05:44)    Mean Cell Volume: 97.6 fL (04-26-25 @ 07:58)          Urine Studies:  Protein, Urine: 30 mg/dL (04-25-25 @ 18:50)  White Blood Cell - Urine: 4 /HPF (04-25-25 @ 18:50)  Red Blood Cell - Urine: 0 /HPF (04-25-25 @ 18:50)  Protein, Urine: 30 mg/dL (04-01-19 @ 13:50)  Red Blood Cell - Urine: 1-2 /HPF (04-01-19 @ 13:50)      Urea Nitrogen,  Random Urine: 761 mg/dL (04-25-25 @ 18:21)    Sodium, Random Urine: 34.0 mmoL/L (04-25 @ 18:21)  Creatinine, Random Urine: 161 mg/dL (04-25 @ 18:21)    Creatinine trend:  Creatinine: 3.2 mg/dL (04-28-25 @ 07:44)  Creatinine: 3.7 mg/dL (04-27-25 @ 07:41)  Creatinine: 4.4 mg/dL (04-26-25 @ 07:58)  Creatinine: 3.8 mg/dL (04-25-25 @ 18:45)  Creatinine: 4.1 mg/dL (04-25-25 @ 17:00)    US Renal:   ACC: [id number] EXAM:  US KIDNEY(S)   ORDERED BY: CATHERINE ORR     PROCEDURE DATE:  04/25/2025          INTERPRETATION:  CLINICAL HISTORY / REASON FOR EXAM: Acute kidney injury    CORRELATION: CT chest, abdomen and pelvis from April 25, 2025    PROCEDURE: Limited retroperitoneal ultrasound was performed.    FINDINGS:    RIGHT KIDNEY: 7.4 cm. Increased echogenicity. No renal mass,   hydronephrosis or calculi. Intrapolar cyst measuring 1.3 x 1.8 x 1.1 cm.    LEFT KIDNEY: 7.9 cm. Partially obscured. No hydronephrosis. Upper pole   cyst measuring 1.8 x 2.1 x 2.4 cm.      IMPRESSION:    No sonographic evidence of hydronephrosis.    Increased echogenicity of the right kidney which can be seen with medical   renal disease.    --- End of Report ---            HOSEA CAN MD; Attending Radiologist  This document has been electronically signed. Apr 25 2025 10:44PM (04-25-25 @ 20:06)

## 2025-04-28 NOTE — DISCHARGE NOTE PROVIDER - NSDCMRMEDTOKEN_GEN_ALL_CORE_FT
ascorbic acid 500 mg oral tablet: 1 tab(s) orally every 8 hours  Aspir 81 oral delayed release tablet: 1 tab(s) orally once a day  atorvastatin 80 mg oral tablet: 1 tab(s) orally once a day (at bedtime)  azithromycin 500 mg oral tablet: 1 tab(s) orally once a day TAKE ON 4/29/25 ONLY  benzonatate 100 mg oral capsule: 1 cap(s) orally every 8 hours  budesonide-formoterol 160 mcg-4.5 mcg/inh inhalation aerosol: 2 puff(s) inhaled 2 times a day   cefpodoxime 200 mg oral tablet: 1 tab(s) orally every 12 hours TAKE ON 4/29 AND 4/30  guaifenesin-dextromethorphan 100 mg-10 mg/5 mL oral liquid: 10 milliliter(s) orally every 8 hours  NIFEdipine 60 mg oral tablet, extended release: 1 tab(s) orally once a day  ProAir HFA 90 mcg/inh inhalation aerosol: 2 puff(s) inhaled 4 times a day, As Needed  tamsulosin 0.4 mg oral capsule: 1 cap(s) orally once a day (at bedtime)  tiotropium 18 mcg inhalation capsule: 1 cap(s) inhaled once a day  zinc sulfate 220 mg (as elemental zinc 50 mg) oral capsule: 1 cap(s) orally once a day   ascorbic acid 500 mg oral tablet: 1 tab(s) orally every 8 hours  Aspir 81 oral delayed release tablet: 1 tab(s) orally once a day  atorvastatin 80 mg oral tablet: 1 tab(s) orally once a day (at bedtime)  benzonatate 100 mg oral capsule: 1 cap(s) orally every 8 hours  budesonide-formoterol 160 mcg-4.5 mcg/inh inhalation aerosol: 2 puff(s) inhaled 2 times a day   cefpodoxime 200 mg oral tablet: 1 tab(s) orally every 12 hours TAKE ON  4/30 ONLY  guaifenesin-dextromethorphan 100 mg-10 mg/5 mL oral liquid: 10 milliliter(s) orally every 8 hours  NIFEdipine 60 mg oral tablet, extended release: 1 tab(s) orally once a day  ProAir HFA 90 mcg/inh inhalation aerosol: 2 puff(s) inhaled 4 times a day, As Needed  tamsulosin 0.4 mg oral capsule: 1 cap(s) orally once a day (at bedtime)  tiotropium 18 mcg inhalation capsule: 1 cap(s) inhaled once a day  zinc sulfate 220 mg (as elemental zinc 50 mg) oral capsule: 1 cap(s) orally once a day

## 2025-04-28 NOTE — DISCHARGE NOTE PROVIDER - NSDCCPCAREPLAN_GEN_ALL_CORE_FT
PRINCIPAL DISCHARGE DIAGNOSIS  Diagnosis: DMITRIY (acute kidney injury)  Assessment and Plan of Treatment: Acute Kidney Injury superimposed on CKD 5  likely pre-renal from Infection and dehydration (poor oral intake )   Baseline serum creatine around  Scr 3.0  from  2024   You Presented with Scr of 4.1, now improved 3.2 with IV fluids and antibiotics   Renal bladder sonogram did not show any sign of obstruction   Stay hydrated      SECONDARY DISCHARGE DIAGNOSES  Diagnosis: Pneumonia  Assessment and Plan of Treatment: Your CT Chest showed   Bilateral upper and lower lobe scattered reticular and tree-in-bud   opacities. Findings can reflect early pneumonia versus infectious versus   inflammatory etiology.  Given your worsening cough, you are being treated with Pneumonia   Complte antibiotics as recomeneded and follow-up with pcP in 1 week        PRINCIPAL DISCHARGE DIAGNOSIS  Diagnosis: DMITRIY (acute kidney injury)  Assessment and Plan of Treatment: Acute Kidney Injury superimposed on CKD 5  likely pre-renal from Infection and dehydration (poor oral intake )   Baseline serum creatine around  Scr 3.0  from  2024   You Presented with Scr of 4.1, now improved 3.2 with IV fluids and antibiotics   Renal bladder sonogram did not show any sign of obstruction   Stay hydrated      SECONDARY DISCHARGE DIAGNOSES  Diagnosis: Pneumonia  Assessment and Plan of Treatment: Your CT Chest showed   Bilateral upper and lower lobe scattered reticular and tree-in-bud   opacities. Findings can reflect early pneumonia versus infectious versus   inflammatory etiology.  Given your worsening cough, you are being treated with Pneumonia   Complte antibiotics as recomeneded and follow-up with pcP in 1 week   You need to see a pulmonologist, will refer you to one

## 2025-04-28 NOTE — DISCHARGE NOTE PROVIDER - ATTENDING DISCHARGE PHYSICAL EXAMINATION:
GENERAL: No acute distress, frail appearing, presbycusis   HEAD:  Atraumatic, Normocephalic  EYES:conjunctiva and sclera clear  NECK: Supple, no lymphadenopathy, no JVD  CHEST/LUNG: Congested, no wheezing, + productive cough improving   HEART: Regular rate and rhythm; No murmurs, rubs, or gallops  ABDOMEN: Soft, non-tender, non-distended  EXTREMITIES:   No clubbing, cyanosis, or edema  NEUROLOGY: A&O x 3, no focal deficits  SKIN: No rashes or lesions

## 2025-04-28 NOTE — DISCHARGE NOTE PROVIDER - CARE PROVIDERS DIRECT ADDRESSES
[email or web address]

## 2025-04-28 NOTE — DISCHARGE NOTE PROVIDER - HOSPITAL COURSE
Patient is an 88 y/o M with PMH COPD not on home O2, current smoker, HLD, [place], who was sent in by PMD after blood work showed an elevated Creatinine level.       Acute Kidney Injury superimposed on CKD 5  likely pre-renal from Infection and dehydration (poor PO)   Per nephrology pablo CKD 5, Scr 3.0 in 2024   Presented with Scr of 4.1, now improved 3.6  Renal bladder sono: grossly unremarkable  Continue gently hydration       Community acquired Pneumonia   Chronic COPD + Active Smoker  Endorsed productive cough, weakness and anorexia   CXR: New right upper lung peripheral opacity.  CT Chest Bilateral upper and lower lobe scattered reticular and tree-in-bud opacities.  Continue IV ABX and  respiratory Treatments   Follow up Procal, urine and blood cultures, MRSA PCR, legionella, strep

## 2025-04-28 NOTE — DISCHARGE NOTE PROVIDER - PROVIDER TOKENS
PROVIDER:[TOKEN:[[id number]]] PROVIDER:[TOKEN:[[id number]]],PROVIDER:[TOKEN:[[id number]]]

## 2025-04-28 NOTE — DISCHARGE NOTE PROVIDER - CARE PROVIDER_API CALL
Suleiman Dos Santos  Internal Medicine  [address] [place] [id number]  Phone: [phone]  Fax: [phone]  Follow Up Time:    Suleiman Dos Santos  Internal Medicine  [address] [place] [id number]  Phone: [phone]  Fax: [phone]  Follow Up Time:     Sean Martino Juanjo  Pulmonary Disease  [address] [place] [address]  Phone: [phone]  Fax: [phone]  Follow Up Time:

## 2025-04-29 ENCOUNTER — TRANSCRIPTION ENCOUNTER (OUTPATIENT)
Age: 89
End: 2025-04-29

## 2025-04-29 VITALS — HEART RATE: 65 BPM | DIASTOLIC BLOOD PRESSURE: 71 MMHG | SYSTOLIC BLOOD PRESSURE: 139 MMHG

## 2025-04-29 PROCEDURE — 99239 HOSP IP/OBS DSCHRG MGMT >30: CPT

## 2025-04-29 RX ORDER — CEFPODOXIME PROXETIL 200 MG/1
1 TABLET, FILM COATED ORAL
Qty: 2 | Refills: 0
Start: 2025-04-29 | End: 2025-04-29

## 2025-04-29 RX ADMIN — DEXTROMETHORPHAN HBR, GUAIFENESIN 10 MILLILITER(S): 20; 200 SOLUTION ORAL at 14:57

## 2025-04-29 RX ADMIN — Medication 100 MILLIGRAM(S): at 05:19

## 2025-04-29 RX ADMIN — IPRATROPIUM BROMIDE AND ALBUTEROL SULFATE 3 MILLILITER(S): .5; 2.5 SOLUTION RESPIRATORY (INHALATION) at 07:51

## 2025-04-29 RX ADMIN — IPRATROPIUM BROMIDE AND ALBUTEROL SULFATE 3 MILLILITER(S): .5; 2.5 SOLUTION RESPIRATORY (INHALATION) at 15:21

## 2025-04-29 RX ADMIN — HEPARIN SODIUM 5000 UNIT(S): 1000 INJECTION INTRAVENOUS; SUBCUTANEOUS at 14:57

## 2025-04-29 RX ADMIN — IPRATROPIUM BROMIDE AND ALBUTEROL SULFATE 3 MILLILITER(S): .5; 2.5 SOLUTION RESPIRATORY (INHALATION) at 01:17

## 2025-04-29 RX ADMIN — Medication 81 MILLIGRAM(S): at 12:25

## 2025-04-29 RX ADMIN — Medication 4 MILLILITER(S): at 07:52

## 2025-04-29 RX ADMIN — HEPARIN SODIUM 5000 UNIT(S): 1000 INJECTION INTRAVENOUS; SUBCUTANEOUS at 05:18

## 2025-04-29 RX ADMIN — Medication 100 MILLIGRAM(S): at 14:57

## 2025-04-29 RX ADMIN — TIOTROPIUM BROMIDE INHALATION SPRAY 2 PUFF(S): 3.12 SPRAY, METERED RESPIRATORY (INHALATION) at 12:22

## 2025-04-29 RX ADMIN — Medication 1 APPLICATION(S): at 05:19

## 2025-04-29 RX ADMIN — Medication 500 MILLIGRAM(S): at 14:57

## 2025-04-29 RX ADMIN — DEXTROMETHORPHAN HBR, GUAIFENESIN 10 MILLILITER(S): 20; 200 SOLUTION ORAL at 05:19

## 2025-04-29 RX ADMIN — CEFTRIAXONE 100 MILLIGRAM(S): 500 INJECTION, POWDER, FOR SOLUTION INTRAMUSCULAR; INTRAVENOUS at 15:03

## 2025-04-29 RX ADMIN — Medication 250 MILLIGRAM(S): at 15:02

## 2025-04-29 RX ADMIN — Medication 500 MILLIGRAM(S): at 05:19

## 2025-04-29 RX ADMIN — Medication 220 MILLIGRAM(S): at 12:25

## 2025-04-29 NOTE — DISCHARGE NOTE NURSING/CASE MANAGEMENT/SOCIAL WORK - NSDCPEFALRISK_GEN_ALL_CORE
For information on Fall & Injury Prevention, visit: [email or web address] OR  [email or web address] OR  https://www.cdc.gov/steadi/patient.html

## 2025-04-29 NOTE — DISCHARGE NOTE NURSING/CASE MANAGEMENT/SOCIAL WORK - PATIENT PORTAL LINK FT
You can access the FollowMyHealth Patient Portal offered by [facility] by registering at the following website: [email or web address]. By joining [facility]’s FollowMyHealth portal, you will also be able to view your health information using other applications (apps) compatible with our system.

## 2025-04-29 NOTE — PROGRESS NOTE ADULT - ASSESSMENT
89 y/o M with PMH COPD not on home O2, current smoker, HLD, HTN, [place], who was sent in by PMD after blood work showed an elevated Creatinine level.     Creat 4.1-> 3.8-> 4.4  K 5.5  CTAP - no hydro  Kidney sono (4/2025) small kidney s 7.2/7.4 cm no hydro, increased echogenicity, Bladder not checked  UO not documented      Advanced stage 5 kidney disease  - small kidneys b/l  possible pre-renal component - creat was ~ 3.0 in 2024  ANCA  Hep B and C were neg in 2019  etiology likely HTN    - Cr irmpvoing on IVf  - noninvasive condom cath for UO - non  documented  - start Lokelma 10 grams daily  -renal diet  check phos, iPTH  HTN - cont Nifedipine, avoid hypotension  COPD on empiric Abx  no need for RRT    
Patient is an 89 y/o M with PMH COPD not on home O2, current smoker, HLD, [place], who was sent in by PMD after blood work showed an elevated Creatinine level.       Acute Kidney Injury superimposed on CKD 5  likely pre-renal from Infection and dehydration (poor PO)   Per nephrology pablo CKD 5, Scr 3.0 in 2024   Presented with Scr of 4.1, now >>3.2   Renal bladder sono: grossly unremarkable  D./C IVF    Community acquired Pneumonia   Chronic COPD + Active Smoker  Endorsed productive cough, weakness and anorexia   CXR: New right upper lung peripheral opacity.  CT Chest Bilateral upper and lower lobe scattered reticular and tree-in-bud opacities.  Continue IV ABX and  respiratory Treatments   Discharge cancelled>>amb pulse ox 88% RA  Repeat in AM v.s arrange fro Home oxygen     Hypertension  BP intermittently high and low   Continue with nifedipine, monitor       VTE ppx: heparin  GI ppx: not indicated.  Full code   Dispo: from Home   Discharge cancelled>>amb pulse ox 88% RA  Repeat in AM v.s arrange for home oxygen     
88 y/o M with PMH COPD not on home O2, current smoker, HLD, HTN, CKD 4, who was sent in by PMD after blood work showed an elevated Creatinine level.     Creat improved to baseline with iv fluids  CTAP - no hydro  Kidney sono (4/2025) small kidney s 7.2/7.4 cm no hydro, increased echogenicity, Bladder not checked  Advanced stage 5 kidney disease  - small kidneys b/l  ANCA Hep B and C were neg in 2019  etiology likely HTN  - phos at goal  - h/h at goal  - maintain low K diet/ no need to continue lokelma  HTN - cont Nifedipine  COPD on empiric Abx  no need for RRT    
89 y/o M with PMH COPD not on home O2, current smoker, HLD, HTN, [place], who was sent in by PMD after blood work showed an elevated Creatinine level.     Creat improved to baseline with iv fluids  CTAP - no hydro  Kidney sono (4/2025) small kidney s 7.2/7.4 cm no hydro, increased echogenicity, Bladder not checked  Advanced stage 5 kidney disease  - small kidneys b/l  ANCA  Hep B and C were neg in 2019  etiology likely HTN  -phos at goal  -renal diet  HTN - cont Nifedipine  COPD on empiric Abx  no need for RRT    
Patient is an 88 y/o M with PMH COPD not on home O2, current smoker, HLD, [place], who was sent in by PMD after blood work showed an elevated Creatinine level.       Acute Kidney Injury superimposed on CKD IV   likely pre-renal from Infection and dehydration (poor PO)   Baseline Scr around  2.8-3.2 , GFR: 17-21   Presented with Scr of 4.1, improved with some IVF to 3.1  Renal bladder sono: grossly unremarkable, f/up urine studies   Continue gently hydration       Community acquired Pneumonia   Chronic COPD + Active Smoker  Endorsed productive cough, weakness and anorexia   CXR: New right upper lung peripheral opacity.  CT Chest Bilateral upper and lower lobe scattered reticular and tree-in-bud opacities.  Continue IV ABX and  respiratory Treatments   Follow up Procal, urine and blood cultures, MRSA PCR, legionella, strep    Hypertension  BP intermittently high and low   Continue with nifedipine, monitor       VTE ppx: heparin  GI ppx: not indicated.  Full code   Dispo: from Home   [facility]   Anticipate 48hrs 
Patient is an 89 y/o M with PMH COPD not on home O2, current smoker, HLD, [place], who was sent in by PMD after blood work showed an elevated Creatinine level.       Acute Kidney Injury superimposed on CKD 5  likely pre-renal from Infection and dehydration (poor PO)   Per nephrology pablo CKD 5, Scr 3.0 in 2024   Presented with Scr of 4.1, now improved 3.6  Renal bladder sono: grossly unremarkable  Continue gently hydration       Community acquired Pneumonia   Chronic COPD + Active Smoker  Endorsed productive cough, weakness and anorexia   CXR: New right upper lung peripheral opacity.  CT Chest Bilateral upper and lower lobe scattered reticular and tree-in-bud opacities.  Continue IV ABX and  respiratory Treatments   Follow up Procal, urine and blood cultures, MRSA PCR, legionella, strep    Hypertension  BP intermittently high and low   Continue with nifedipine, monitor       VTE ppx: heparin  GI ppx: not indicated.  Full code   Dispo: from Home   [facility]   Anticipate 24- 48hrs

## 2025-04-29 NOTE — PROGRESS NOTE ADULT - SUBJECTIVE AND OBJECTIVE BOX
Nephrology Progress Note    JARED ROBLES  MRN-[id number]  89y  Male    S:  Patient is seen and examined, events over the last 24h noted.    O:  Allergies:  No Known Allergies    Hospital Medications:   MEDICATIONS  (STANDING):  albuterol/ipratropium for Nebulization 3 milliLiter(s) Nebulizer every 6 hours  ascorbic acid 500 milliGRAM(s) Oral every 8 hours  aspirin enteric coated 81 milliGRAM(s) Oral daily  atorvastatin 80 milliGRAM(s) Oral at bedtime  azithromycin  IVPB 500 milliGRAM(s) IV Intermittent every 24 hours  benzonatate 100 milliGRAM(s) Oral every 8 hours  cefTRIAXone   IVPB 1000 milliGRAM(s) IV Intermittent every 24 hours  guaifenesin/dextromethorphan Oral Liquid 10 milliLiter(s) Oral every 8 hours  heparin   Injectable 5000 Unit(s) SubCutaneous every 8 hours  mineral oil enema 133 milliLiter(s) Rectal once  NIFEdipine XL 60 milliGRAM(s) Oral daily  sodium chloride 3%  Inhalation 4 milliLiter(s) Inhalation every 12 hours  sodium zirconium cyclosilicate 10 Gram(s) Oral once  tamsulosin 0.4 milliGRAM(s) Oral at bedtime  tiotropium 2.5 MICROgram(s) Inhaler 2 Puff(s) Inhalation daily  zinc sulfate 220 milliGRAM(s) Oral daily    MEDICATIONS  (PRN):  acetaminophen     Tablet .. 650 milliGRAM(s) Oral every 6 hours PRN Temp greater or equal to 38C (100.4F), Mild Pain (1 - 3)  aluminum hydroxide/magnesium hydroxide/simethicone Suspension 30 milliLiter(s) Oral every 4 hours PRN Dyspepsia  melatonin 3 milliGRAM(s) Oral at bedtime PRN Insomnia  ondansetron Injectable 4 milliGRAM(s) IV Push every 8 hours PRN Nausea and/or Vomiting    Home Medications:      VITALS:  Daily     Daily   T(F): 98.5 (04-29-25 @ 14:17), Max: 98.9 (04-29-25 @ 04:52)  HR: 65 (04-29-25 @ 15:23)  BP: 139/71 (04-29-25 @ 15:23)  RR: 18 (04-29-25 @ 14:17)  SpO2: 98% (04-29-25 @ 14:17)  Wt(kg): --  I&O's Detail    I&O's Summary        PHYSICAL EXAM:  Gen: NAD  Chest: b/l breath sounds  Abd: soft  Extremities: no edema      LABS:    04-28    144  |  112[H]  |  49[H]  ----------------------------<  79  5.1[H]   |  22  |  3.2[H]    Ca    7.9[L]      28 Apr 2025 07:44      Phosphorus: 3.2 mg/dL (04-26-25 @ 07:58)      Urine Studies:  Protein, Urine: 30 mg/dL (04-25-25 @ 18:50)  White Blood Cell - Urine: 4 /HPF (04-25-25 @ 18:50)  Red Blood Cell - Urine: 0 /HPF (04-25-25 @ 18:50)    Creatinine trend:  Creatinine: 3.2 mg/dL (04-28-25 @ 07:44)  Creatinine: 3.7 mg/dL (04-27-25 @ 07:41)  Creatinine: 4.4 mg/dL (04-26-25 @ 07:58)  Creatinine: 3.8 mg/dL (04-25-25 @ 18:45)  Creatinine: 4.1 mg/dL (04-25-25 @ 17:00)

## 2025-04-29 NOTE — DISCHARGE NOTE NURSING/CASE MANAGEMENT/SOCIAL WORK - FINANCIAL ASSISTANCE
[facility] provides services at a reduced cost to those who are determined to be eligible through [facility]’s financial assistance program. Information regarding [facility]’s financial assistance program can be found by going to [email or web address] or by calling [phone].

## 2025-04-29 NOTE — PHYSICAL THERAPY INITIAL EVALUATION ADULT - PERTINENT HX OF CURRENT PROBLEM, REHAB EVAL
Patient is an 89 y/o M with PMH COPD not on home O2, current smoker, HLD, [place], who was sent in by PMD after blood work showed an elevated Creatinine level.     # Weakness and cough 2/2 to PNA  # Hx of COPD (current smoker)

## 2025-04-29 NOTE — PHYSICAL THERAPY INITIAL EVALUATION ADULT - ADDITIONAL COMMENTS
pt lives alone in a private house with 5-6 steps to enter and no stairs inside.  Pt was independent community ambulator prior to admission without AD.  Pt was able to drive as needed prior to admission

## 2025-04-29 NOTE — PHYSICAL THERAPY INITIAL EVALUATION ADULT - GENERAL OBSERVATIONS, REHAB EVAL
10;36-11;00 pt was seen for PT IE at bed side, pt is agreeable, chart thoroughly reviewed, RN Saniya is aware.  Pt received and left semi mcneal in bed, in no apparent distress, +hep lock, +call bell within reach, bed side table at reach

## 2025-04-29 NOTE — CHART NOTE - NSCHARTNOTEFT_GEN_A_CORE
Patient requires a rolling walker for d/c to home due to mobility deficient that can be improved with the use of a walker, as documented by physical therapy during assessment. Patient is able to safely use walker.

## 2025-04-29 NOTE — PHYSICAL THERAPY INITIAL EVALUATION ADULT - CRITERIA FOR SKILLED THERAPEUTIC INTERVENTIONS
Based on the findings pt is at base line functional level for amb and transfers, demonstrated good balance with ADLs and will be d/c from PT with RW

## 2025-05-03 DIAGNOSIS — N17.9 ACUTE KIDNEY FAILURE, UNSPECIFIED: ICD-10-CM

## 2025-05-03 DIAGNOSIS — E86.0 DEHYDRATION: ICD-10-CM

## 2025-05-03 DIAGNOSIS — N18.5 CHRONIC KIDNEY DISEASE, STAGE 5: ICD-10-CM

## 2025-05-03 DIAGNOSIS — Z79.51 LONG TERM (CURRENT) USE OF INHALED STEROIDS: ICD-10-CM

## 2025-05-03 DIAGNOSIS — I12.0 HYPERTENSIVE CHRONIC KIDNEY DISEASE WITH STAGE 5 CHRONIC KIDNEY DISEASE OR END STAGE RENAL DISEASE: ICD-10-CM

## 2025-05-03 DIAGNOSIS — J18.9 PNEUMONIA, UNSPECIFIED ORGANISM: ICD-10-CM

## 2025-05-03 DIAGNOSIS — Z96.1 PRESENCE OF INTRAOCULAR LENS: ICD-10-CM

## 2025-05-03 DIAGNOSIS — F17.210 NICOTINE DEPENDENCE, CIGARETTES, UNCOMPLICATED: ICD-10-CM

## 2025-05-03 DIAGNOSIS — Z79.82 LONG TERM (CURRENT) USE OF ASPIRIN: ICD-10-CM

## 2025-05-03 DIAGNOSIS — J44.0 CHRONIC OBSTRUCTIVE PULMONARY DISEASE WITH (ACUTE) LOWER RESPIRATORY INFECTION: ICD-10-CM

## 2025-05-03 DIAGNOSIS — N27.0 SMALL KIDNEY, UNILATERAL: ICD-10-CM

## 2025-07-16 ENCOUNTER — INPATIENT (INPATIENT)
Facility: HOSPITAL | Age: 89
LOS: 1 days | Discharge: HOME | DRG: 641 | End: 2025-07-18
Attending: STUDENT IN AN ORGANIZED HEALTH CARE EDUCATION/TRAINING PROGRAM | Admitting: STUDENT IN AN ORGANIZED HEALTH CARE EDUCATION/TRAINING PROGRAM
Payer: MEDICARE

## 2025-07-16 VITALS
HEART RATE: 92 BPM | DIASTOLIC BLOOD PRESSURE: 64 MMHG | OXYGEN SATURATION: 97 % | HEIGHT: 65 IN | SYSTOLIC BLOOD PRESSURE: 103 MMHG | RESPIRATION RATE: 19 BRPM | WEIGHT: 104.94 LBS | TEMPERATURE: 99 F

## 2025-07-16 DIAGNOSIS — Z96.1 PRESENCE OF INTRAOCULAR LENS: Chronic | ICD-10-CM

## 2025-07-16 DIAGNOSIS — E87.70 FLUID OVERLOAD, UNSPECIFIED: ICD-10-CM

## 2025-07-16 LAB
ALBUMIN SERPL ELPH-MCNC: 3.5 G/DL — SIGNIFICANT CHANGE UP (ref 3.5–5.2)
ALP SERPL-CCNC: 110 U/L — SIGNIFICANT CHANGE UP (ref 30–115)
ALT FLD-CCNC: 9 U/L — SIGNIFICANT CHANGE UP (ref 0–41)
ANION GAP SERPL CALC-SCNC: 17 MMOL/L — HIGH (ref 7–14)
AST SERPL-CCNC: 15 U/L — SIGNIFICANT CHANGE UP (ref 0–41)
BASOPHILS # BLD AUTO: 0.04 K/UL — SIGNIFICANT CHANGE UP (ref 0–0.2)
BASOPHILS NFR BLD AUTO: 0.6 % — SIGNIFICANT CHANGE UP (ref 0–2)
BILIRUB SERPL-MCNC: 0.3 MG/DL — SIGNIFICANT CHANGE UP (ref 0.2–1.2)
BUN SERPL-MCNC: 59 MG/DL — HIGH (ref 10–20)
CALCIUM SERPL-MCNC: 7.6 MG/DL — LOW (ref 8.4–10.5)
CHLORIDE SERPL-SCNC: 108 MMOL/L — SIGNIFICANT CHANGE UP (ref 98–110)
CO2 SERPL-SCNC: 18 MMOL/L — SIGNIFICANT CHANGE UP (ref 17–32)
CREAT SERPL-MCNC: 3.9 MG/DL — HIGH (ref 0.7–1.5)
EGFR: 14 ML/MIN/1.73M2 — LOW
EGFR: 14 ML/MIN/1.73M2 — LOW
EOSINOPHIL # BLD AUTO: 0.25 K/UL — SIGNIFICANT CHANGE UP (ref 0–0.5)
EOSINOPHIL NFR BLD AUTO: 3.7 % — SIGNIFICANT CHANGE UP (ref 0–6)
GLUCOSE SERPL-MCNC: 102 MG/DL — HIGH (ref 70–99)
HCT VFR BLD CALC: 39.2 % — SIGNIFICANT CHANGE UP (ref 39–50)
HGB BLD-MCNC: 12.1 G/DL — LOW (ref 13–17)
IMM GRANULOCYTES # BLD AUTO: 0.02 K/UL — SIGNIFICANT CHANGE UP (ref 0–0.07)
IMM GRANULOCYTES NFR BLD AUTO: 0.3 % — SIGNIFICANT CHANGE UP (ref 0–0.9)
LYMPHOCYTES # BLD AUTO: 2.25 K/UL — SIGNIFICANT CHANGE UP (ref 1–3.3)
LYMPHOCYTES NFR BLD AUTO: 33.3 % — SIGNIFICANT CHANGE UP (ref 13–44)
MCHC RBC-ENTMCNC: 30.2 PG — SIGNIFICANT CHANGE UP (ref 27–34)
MCHC RBC-ENTMCNC: 30.9 G/DL — LOW (ref 32–36)
MCV RBC AUTO: 97.8 FL — SIGNIFICANT CHANGE UP (ref 80–100)
MONOCYTES # BLD AUTO: 0.76 K/UL — SIGNIFICANT CHANGE UP (ref 0–0.9)
MONOCYTES NFR BLD AUTO: 11.3 % — SIGNIFICANT CHANGE UP (ref 2–14)
NEUTROPHILS # BLD AUTO: 3.43 K/UL — SIGNIFICANT CHANGE UP (ref 1.8–7.4)
NEUTROPHILS NFR BLD AUTO: 50.8 % — SIGNIFICANT CHANGE UP (ref 43–77)
NRBC # BLD AUTO: 0 K/UL — SIGNIFICANT CHANGE UP (ref 0–0)
NRBC # FLD: 0 K/UL — SIGNIFICANT CHANGE UP (ref 0–0)
NRBC BLD AUTO-RTO: 0 /100 WBCS — SIGNIFICANT CHANGE UP (ref 0–0)
NT-PROBNP SERPL-SCNC: 3506 PG/ML — HIGH (ref 0–300)
PLATELET # BLD AUTO: 192 K/UL — SIGNIFICANT CHANGE UP (ref 150–400)
PMV BLD: 9.8 FL — SIGNIFICANT CHANGE UP (ref 7–13)
POTASSIUM SERPL-MCNC: 4.7 MMOL/L — SIGNIFICANT CHANGE UP (ref 3.5–5)
POTASSIUM SERPL-SCNC: 4.7 MMOL/L — SIGNIFICANT CHANGE UP (ref 3.5–5)
PROT SERPL-MCNC: 6.2 G/DL — SIGNIFICANT CHANGE UP (ref 6–8)
RBC # BLD: 4.01 M/UL — LOW (ref 4.2–5.8)
RBC # FLD: 15.3 % — HIGH (ref 10.3–14.5)
SODIUM SERPL-SCNC: 143 MMOL/L — SIGNIFICANT CHANGE UP (ref 135–146)
TROPONIN T, HIGH SENSITIVITY RESULT: 61 NG/L — CRITICAL HIGH (ref 6–21)
TROPONIN T, HIGH SENSITIVITY RESULT: 94 NG/L — CRITICAL HIGH (ref 6–21)
WBC # BLD: 6.75 K/UL — SIGNIFICANT CHANGE UP (ref 3.8–10.5)
WBC # FLD AUTO: 6.75 K/UL — SIGNIFICANT CHANGE UP (ref 3.8–10.5)

## 2025-07-16 PROCEDURE — 71045 X-RAY EXAM CHEST 1 VIEW: CPT | Mod: 26

## 2025-07-16 PROCEDURE — 84133 ASSAY OF URINE POTASSIUM: CPT

## 2025-07-16 PROCEDURE — 80053 COMPREHEN METABOLIC PANEL: CPT

## 2025-07-16 PROCEDURE — 99233 SBSQ HOSP IP/OBS HIGH 50: CPT

## 2025-07-16 PROCEDURE — 85027 COMPLETE CBC AUTOMATED: CPT

## 2025-07-16 PROCEDURE — 94640 AIRWAY INHALATION TREATMENT: CPT

## 2025-07-16 PROCEDURE — 97162 PT EVAL MOD COMPLEX 30 MIN: CPT | Mod: GP

## 2025-07-16 PROCEDURE — 80048 BASIC METABOLIC PNL TOTAL CA: CPT

## 2025-07-16 PROCEDURE — 99285 EMERGENCY DEPT VISIT HI MDM: CPT

## 2025-07-16 PROCEDURE — 84300 ASSAY OF URINE SODIUM: CPT

## 2025-07-16 PROCEDURE — 93306 TTE W/DOPPLER COMPLETE: CPT

## 2025-07-16 PROCEDURE — 36415 COLL VENOUS BLD VENIPUNCTURE: CPT

## 2025-07-16 PROCEDURE — 71250 CT THORAX DX C-: CPT

## 2025-07-16 PROCEDURE — 76770 US EXAM ABDO BACK WALL COMP: CPT | Mod: 26

## 2025-07-16 PROCEDURE — 83735 ASSAY OF MAGNESIUM: CPT

## 2025-07-16 PROCEDURE — 71250 CT THORAX DX C-: CPT | Mod: 26

## 2025-07-16 PROCEDURE — 82570 ASSAY OF URINE CREATININE: CPT

## 2025-07-16 PROCEDURE — 81003 URINALYSIS AUTO W/O SCOPE: CPT

## 2025-07-16 PROCEDURE — 84156 ASSAY OF PROTEIN URINE: CPT

## 2025-07-16 PROCEDURE — 93970 EXTREMITY STUDY: CPT | Mod: 26

## 2025-07-16 PROCEDURE — 76770 US EXAM ABDO BACK WALL COMP: CPT

## 2025-07-16 PROCEDURE — 83935 ASSAY OF URINE OSMOLALITY: CPT

## 2025-07-16 PROCEDURE — 84540 ASSAY OF URINE/UREA-N: CPT

## 2025-07-16 RX ORDER — ASPIRIN 325 MG
81 TABLET ORAL DAILY
Refills: 0 | Status: DISCONTINUED | OUTPATIENT
Start: 2025-07-16 | End: 2025-07-18

## 2025-07-16 RX ORDER — FUROSEMIDE 10 MG/ML
40 INJECTION INTRAMUSCULAR; INTRAVENOUS ONCE
Refills: 0 | Status: COMPLETED | OUTPATIENT
Start: 2025-07-16 | End: 2025-07-16

## 2025-07-16 RX ORDER — HEPARIN SODIUM 1000 [USP'U]/ML
5000 INJECTION INTRAVENOUS; SUBCUTANEOUS EVERY 12 HOURS
Refills: 0 | Status: DISCONTINUED | OUTPATIENT
Start: 2025-07-16 | End: 2025-07-18

## 2025-07-16 RX ORDER — BUMETANIDE 1 MG/1
1 TABLET ORAL
Refills: 0 | Status: DISCONTINUED | OUTPATIENT
Start: 2025-07-16 | End: 2025-07-17

## 2025-07-16 RX ORDER — ATORVASTATIN CALCIUM 80 MG/1
80 TABLET, FILM COATED ORAL AT BEDTIME
Refills: 0 | Status: DISCONTINUED | OUTPATIENT
Start: 2025-07-16 | End: 2025-07-18

## 2025-07-16 RX ORDER — ACETAMINOPHEN 500 MG/5ML
650 LIQUID (ML) ORAL EVERY 6 HOURS
Refills: 0 | Status: DISCONTINUED | OUTPATIENT
Start: 2025-07-16 | End: 2025-07-18

## 2025-07-16 RX ORDER — MAGNESIUM, ALUMINUM HYDROXIDE 200-200 MG
30 TABLET,CHEWABLE ORAL EVERY 4 HOURS
Refills: 0 | Status: DISCONTINUED | OUTPATIENT
Start: 2025-07-16 | End: 2025-07-17

## 2025-07-16 RX ORDER — MELATONIN 5 MG
3 TABLET ORAL AT BEDTIME
Refills: 0 | Status: DISCONTINUED | OUTPATIENT
Start: 2025-07-16 | End: 2025-07-18

## 2025-07-16 RX ORDER — TAMSULOSIN HYDROCHLORIDE 0.4 MG/1
0.4 CAPSULE ORAL AT BEDTIME
Refills: 0 | Status: DISCONTINUED | OUTPATIENT
Start: 2025-07-16 | End: 2025-07-18

## 2025-07-16 RX ADMIN — ATORVASTATIN CALCIUM 80 MILLIGRAM(S): 80 TABLET, FILM COATED ORAL at 23:47

## 2025-07-16 RX ADMIN — TAMSULOSIN HYDROCHLORIDE 0.4 MILLIGRAM(S): 0.4 CAPSULE ORAL at 23:46

## 2025-07-16 RX ADMIN — BUMETANIDE 1 MILLIGRAM(S): 1 TABLET ORAL at 18:54

## 2025-07-16 RX ADMIN — FUROSEMIDE 40 MILLIGRAM(S): 10 INJECTION INTRAMUSCULAR; INTRAVENOUS at 16:07

## 2025-07-16 NOTE — ED PROVIDER NOTE - OBJECTIVE STATEMENT
89 yo male, pmh of htn, hld, copd, p/w worsening lower extremity swelling, several days, sent in by pcp, pt has ckd and pcp was worried labs may be worsening. Denies fever, chills, cp, sob, neck pain, visual changes, nvd, dizziness, numbness, tingling.

## 2025-07-16 NOTE — ED PROVIDER NOTE - CARE PLAN
1 Principal Discharge DX:	Fluid overload  Secondary Diagnosis:	Elevated troponin  Secondary Diagnosis:	Chronic kidney disease (CKD)

## 2025-07-16 NOTE — ED PROVIDER NOTE - ATTENDING APP SHARED VISIT CONTRIBUTION OF CARE
I have personally performed a history and physical exam on this patient and personally directed the management of the patient. Patient is an 82-year-old male presents for evaluation of bilateral lower extremity edema worse over several days sent in by PMD for further evaluation patient denies headache visual changes chest pain shortness of breath abdominal pain back pain nausea vomiting or diaphoresis    On physical exam patient is normocephalic atraumatic pupils equal round reactive light accommodation extraocular muscles intact oropharynx clear regular rate and rhythm radial pulses 2+ pedal pulses 2+ chest clear to auscultation bilaterally abdomen soft nontender nondistended bowel sounds positive no guarding or rebound extremities patient does have lower extremity edema pedal pulses 2+ capillary refills normal    Assessment plan-we obtained EKG per my independent evaluation not consistent with STEMI no excessive QT prolongation on consistent with arrhythmia in addition maintain chest x-ray per my independent evaluation not consistent with pneumothorax given the patient's symptoms we obtained DVT studies which are negative for DVT patient found to have elevated troponin at 90 4 repeat 61 in edition found to have elevation in BNP to 3/5/2006 patient given albuterol Lasix magnesium although patient has shortness of breath with evidence of potential infiltrate on the right patient's can condition is most consistent with CHF especially considering her labs this is not consistent with an infectious cause at this point as patient is afebrile therefore will not initiate antibiotics or septic protocols given history and physical exam I have personally performed a history and physical exam on this patient and personally directed the management of the patient. Patient is an 89-year-old male presents for evaluation of bilateral lower extremity edema worse over several days sent in by PMD for further evaluation patient denies headache visual changes chest pain shortness of breath abdominal pain back pain nausea vomiting or diaphoresis    On physical exam patient is normocephalic atraumatic pupils equal round reactive light accommodation extraocular muscles intact oropharynx clear regular rate and rhythm radial pulses 2+ pedal pulses 2+ chest clear to auscultation bilaterally abdomen soft nontender nondistended bowel sounds positive no guarding or rebound extremities patient does have lower extremity edema pedal pulses 2+ capillary refills normal    Assessment plan-we obtained EKG per my independent evaluation not consistent with STEMI no excessive QT prolongation on consistent with arrhythmia in addition maintain chest x-ray per my independent evaluation not consistent with pneumothorax given the patient's symptoms we obtained DVT studies which are negative for DVT patient found to have elevated troponin at 90 4 repeat 61 in edition found to have elevation in BNP to 3/5/2006 patient given albuterol Lasix magnesium although patient has shortness of breath with evidence of potential infiltrate on the right patient's can condition is most consistent with CHF especially considering her labs this is not consistent with an infectious cause at this point as patient is afebrile therefore will not initiate antibiotics or septic protocols given history and physical exam

## 2025-07-16 NOTE — H&P ADULT - NSHPPHYSICALEXAM_GEN_ALL_CORE
GENERAL:  89y/o Male NAD, resting comfortably.  HEAD:  Atraumatic, Normocephalic  EYES: EOMI, PERRLA, conjunctiva and sclera clear  NECK: Supple, No JVD, no cervical lymphadenopathy, non-tender  CHEST/LUNG: Clear to auscultation bilaterally; No wheeze, rhonchi, or rales  HEART: Regular rate and rhythm; S1&S2  ABDOMEN: Soft, Nontender, Nondistended x 4 quadrants; Bowel sounds present  EXTREMITIES:   Peripheral Pulses Present, No clubbing, no cyanosis, or no edema, no calf tenderness  PSYCH: AAOx3, cooperative, appropriate  NEUROLOGY: WNL  SKIN: WNL

## 2025-07-16 NOTE — ED ADULT NURSE NOTE - NSFALLRISKINTERV_ED_ALL_ED

## 2025-07-16 NOTE — H&P ADULT - NSHPLABSRESULTS_GEN_ALL_CORE
12.1   6.75  )-----------( 192      ( 16 Jul 2025 13:06 )             39.2       07-16    143  |  108  |  59[H]  ----------------------------<  102[H]  4.7   |  18  |  3.9[H]    Ca    7.6[L]      16 Jul 2025 13:06    TPro  6.2  /  Alb  3.5  /  TBili  0.3  /  DBili  x   /  AST  15  /  ALT  9   /  AlkPhos  110  07-16              Urinalysis Basic - ( 16 Jul 2025 13:06 )    Color: x / Appearance: x / SG: x / pH: x  Gluc: 102 mg/dL / Ketone: x  / Bili: x / Urobili: x   Blood: x / Protein: x / Nitrite: x   Leuk Esterase: x / RBC: x / WBC x   Sq Epi: x / Non Sq Epi: x / Bacteria: x            < from: Xray Chest 1 View-PORTABLE IMMEDIATE (Xray Chest 1 View-PORTABLE IMMEDIATE .) (07.16.25 @ 14:11) >        IMPRESSION:    Persistent opacities in both apices, right worse than left. Developing   infiltrate right lower lobe. Could reflect pneumonia. Follow-up   recommended.    Hyperinflation.    --- End of Report ---        RICARDO SILVA MD; Attending Radiologist  This document has been electronically signed. Jul 16 2025  2:15PM    < end of copied text >

## 2025-07-16 NOTE — ED PROVIDER NOTE - CLINICAL SUMMARY MEDICAL DECISION MAKING FREE TEXT BOX
Patient is an 82-year-old male presents for evaluation of bilateral lower extremity edema worse over several days sent in by PMD for further evaluation patient denies headache visual changes chest pain shortness of breath abdominal pain back pain nausea vomiting or diaphoresis    On physical exam patient is normocephalic atraumatic pupils equal round reactive light accommodation extraocular muscles intact oropharynx clear regular rate and rhythm radial pulses 2+ pedal pulses 2+ chest clear to auscultation bilaterally abdomen soft nontender nondistended bowel sounds positive no guarding or rebound extremities patient does have lower extremity edema pedal pulses 2+ capillary refills normal    Assessment plan-we obtained EKG per my independent evaluation not consistent with STEMI no excessive QT prolongation on consistent with arrhythmia in addition maintain chest x-ray per my independent evaluation not consistent with pneumothorax given the patient's symptoms we obtained DVT studies which are negative for DVT patient found to have elevated troponin at 90 4 repeat 61 in edition found to have elevation in BNP to 3/5/2006 patient given albuterol Lasix magnesium although patient has shortness of breath with evidence of potential infiltrate on the right patient's can condition is most consistent with CHF especially considering her labs this is not consistent with an infectious cause at this point as patient is afebrile therefore will not initiate antibiotics or septic protocols given history and physical exam Patient is an 89-year-old male presents for evaluation of bilateral lower extremity edema worse over several days sent in by PMD for further evaluation patient denies headache visual changes chest pain shortness of breath abdominal pain back pain nausea vomiting or diaphoresis    On physical exam patient is normocephalic atraumatic pupils equal round reactive light accommodation extraocular muscles intact oropharynx clear regular rate and rhythm radial pulses 2+ pedal pulses 2+ chest clear to auscultation bilaterally abdomen soft nontender nondistended bowel sounds positive no guarding or rebound extremities patient does have lower extremity edema pedal pulses 2+ capillary refills normal    Assessment plan-we obtained EKG per my independent evaluation not consistent with STEMI no excessive QT prolongation on consistent with arrhythmia in addition maintain chest x-ray per my independent evaluation not consistent with pneumothorax given the patient's symptoms we obtained DVT studies which are negative for DVT patient found to have elevated troponin at 90 4 repeat 61 in edition found to have elevation in BNP to 3/5/2006 patient given albuterol Lasix magnesium although patient has shortness of breath with evidence of potential infiltrate on the right patient's can condition is most consistent with CHF especially considering her labs this is not consistent with an infectious cause at this point as patient is afebrile therefore will not initiate antibiotics or septic protocols given history and physical exam

## 2025-07-16 NOTE — ED PROVIDER NOTE - WR ORDER ID 1
[id number]

## 2025-07-16 NOTE — H&P ADULT - ASSESSMENT
89 y/o male, pmh of htn, hld, copd, p/w worsening lower extremity swelling, several days, sent in by pcp, Pt has ckd and pcp was worried labs may be worsening.      # COPD  # CXR Persistent opacities in both apices, right worse than left. Developing   infiltrate right lower lobe.  -Nebulizer q6  - repeat CBC  - procal/lactate    # Elevated BNP  # Lower Ext Edema  # CKD  - Admit telemetry  - Nephrology consult  - Renal diet  - mild diuresis    # HTN  # HLD  - continue home meds  - dash diet

## 2025-07-16 NOTE — ED ADULT NURSE NOTE - OBJECTIVE STATEMENT
pt coming to ED c/o not eating and sleeping more often. brother states he noticed his feet are swollen.

## 2025-07-16 NOTE — ED ADULT NURSE NOTE - NSICDXPASTMEDICALHX_GEN_ALL_CORE_FT
PAST MEDICAL HISTORY:  Advanced COPD     H/O: HTN (hypertension)     [place] (hard of hearing)

## 2025-07-16 NOTE — H&P ADULT - NS ATTEND AMEND GEN_ALL_CORE FT
Bilateral Lower extremity Pitting 2-3+ edema   Possible new Onset CHF: cough with congestion on exam , no overt crackles   Progression of CKD IV   Elevated troponin: likely from CKD, No CP, No ST changes on EKG     Check CT Chest, ECHO, Urine studies and renal bladder sono   get Nephro consult  Bumex 1mg Q12, strict I/O's  Monitor on tele Bilateral Lower extremity Pitting 2-3+ edema up to B/L shins   Possible new Onset CHF: cough with congestion on exam , no overt crackles   Progression of CKD IV  Elevated troponin: likely from CKD, No CP, No ST changes on EKG     Check CT Chest, ECHO, Urine studies and renal bladder sono   get Nephro consult  Bumex 1mg Q12, strict I/O's  Monitor on tele

## 2025-07-16 NOTE — H&P ADULT - NSICDXPASTMEDICALHX_GEN_ALL_CORE_FT
PAST MEDICAL HISTORY:  Advanced COPD     Chronic kidney disease, unspecified CKD stage     H/O: HTN (hypertension)     [place] (hard of hearing)

## 2025-07-16 NOTE — ED PROVIDER NOTE - NSICDXPASTMEDICALHX_GEN_ALL_CORE_FT
PAST MEDICAL HISTORY:  Advanced COPD     H/O: HTN (hypertension)     [place] (hard of hearing)

## 2025-07-16 NOTE — H&P ADULT - HISTORY OF PRESENT ILLNESS
89 y/o male, pmh of htn, hld, copd, p/w worsening lower extremity swelling, several days, sent in by pcp, Pt has ckd and pcp was worried labs may be worsening. Denies fever, chills, cp, sob, neck pain, visual changes, nvd, dizziness, numbness, tingling.  Patient speaks [place] and  #[id number] attempted to converse but patient prefers english.  Patient unsure of medical history and care and states his brother brought him in.

## 2025-07-16 NOTE — ED PROVIDER NOTE - PHYSICAL EXAMINATION
Physical Exam    Vital Signs: I have reviewed the initial vital signs.  Constitutional: appears stated age, no acute distress  Eyes: Conjunctiva pink, Sclera clear,   Cardiovascular: S1 and S2, regular rate, regular rhythm, well-perfused extremities, radial pulses equal and 2+, pedal pulses 2+ and equal  Respiratory: unlabored respiratory effort, clear to auscultation bilaterally no wheezing, rales and rhonchi  Gastrointestinal: soft, non-tender abdomen, no pulsatile mass, normal bowl sounds  Musculoskeletal: supple neck, + lower extremity edema, no midline tenderness  Integumentary: warm, dry, no rash  Neurologic: awake, alert, nvi

## 2025-07-17 ENCOUNTER — RESULT REVIEW (OUTPATIENT)
Age: 89
End: 2025-07-17

## 2025-07-17 LAB
ALBUMIN SERPL ELPH-MCNC: 3.3 G/DL — LOW (ref 3.5–5.2)
ALP SERPL-CCNC: 97 U/L — SIGNIFICANT CHANGE UP (ref 30–115)
ALT FLD-CCNC: 7 U/L — SIGNIFICANT CHANGE UP (ref 0–41)
ANION GAP SERPL CALC-SCNC: 17 MMOL/L — HIGH (ref 7–14)
APPEARANCE UR: CLEAR — SIGNIFICANT CHANGE UP
AST SERPL-CCNC: 10 U/L — SIGNIFICANT CHANGE UP (ref 0–41)
BILIRUB SERPL-MCNC: 0.2 MG/DL — SIGNIFICANT CHANGE UP (ref 0.2–1.2)
BILIRUB UR-MCNC: NEGATIVE — SIGNIFICANT CHANGE UP
BUN SERPL-MCNC: 62 MG/DL — CRITICAL HIGH (ref 10–20)
CALCIUM SERPL-MCNC: 7.7 MG/DL — LOW (ref 8.4–10.5)
CHLORIDE SERPL-SCNC: 109 MMOL/L — SIGNIFICANT CHANGE UP (ref 98–110)
CO2 SERPL-SCNC: 17 MMOL/L — SIGNIFICANT CHANGE UP (ref 17–32)
COLOR SPEC: YELLOW — SIGNIFICANT CHANGE UP
CREAT ?TM UR-MCNC: 26 MG/DL — SIGNIFICANT CHANGE UP
CREAT ?TM UR-MCNC: 26 MG/DL — SIGNIFICANT CHANGE UP
CREAT SERPL-MCNC: 4.2 MG/DL — CRITICAL HIGH (ref 0.7–1.5)
DIFF PNL FLD: NEGATIVE — SIGNIFICANT CHANGE UP
EGFR: 13 ML/MIN/1.73M2 — LOW
EGFR: 13 ML/MIN/1.73M2 — LOW
GLUCOSE SERPL-MCNC: 129 MG/DL — HIGH (ref 70–99)
GLUCOSE UR QL: NEGATIVE MG/DL — SIGNIFICANT CHANGE UP
HCT VFR BLD CALC: 34.9 % — LOW (ref 39–50)
HGB BLD-MCNC: 11.1 G/DL — LOW (ref 13–17)
KETONES UR QL: NEGATIVE MG/DL — SIGNIFICANT CHANGE UP
LEUKOCYTE ESTERASE UR-ACNC: NEGATIVE — SIGNIFICANT CHANGE UP
MCHC RBC-ENTMCNC: 30.5 PG — SIGNIFICANT CHANGE UP (ref 27–34)
MCHC RBC-ENTMCNC: 31.8 G/DL — LOW (ref 32–36)
MCV RBC AUTO: 95.9 FL — SIGNIFICANT CHANGE UP (ref 80–100)
NITRITE UR-MCNC: NEGATIVE — SIGNIFICANT CHANGE UP
NRBC # BLD AUTO: 0 K/UL — SIGNIFICANT CHANGE UP (ref 0–0)
NRBC # FLD: 0 K/UL — SIGNIFICANT CHANGE UP (ref 0–0)
NRBC BLD AUTO-RTO: 0 /100 WBCS — SIGNIFICANT CHANGE UP (ref 0–0)
OSMOLALITY UR: 307 MOS/KG — SIGNIFICANT CHANGE UP (ref 50–1200)
PH UR: 5.5 — SIGNIFICANT CHANGE UP (ref 5–8)
PLATELET # BLD AUTO: 174 K/UL — SIGNIFICANT CHANGE UP (ref 150–400)
PMV BLD: 10.5 FL — SIGNIFICANT CHANGE UP (ref 7–13)
POTASSIUM SERPL-MCNC: 3.6 MMOL/L — SIGNIFICANT CHANGE UP (ref 3.5–5)
POTASSIUM SERPL-SCNC: 3.6 MMOL/L — SIGNIFICANT CHANGE UP (ref 3.5–5)
POTASSIUM UR-SCNC: 7 MMOL/L — SIGNIFICANT CHANGE UP
PROT ?TM UR-MCNC: 5 MG/DL — SIGNIFICANT CHANGE UP
PROT SERPL-MCNC: 5.6 G/DL — LOW (ref 6–8)
PROT UR-MCNC: SIGNIFICANT CHANGE UP MG/DL
PROT/CREAT UR-RTO: 0.2 RATIO — SIGNIFICANT CHANGE UP (ref 0–0.2)
RBC # BLD: 3.64 M/UL — LOW (ref 4.2–5.8)
RBC # FLD: 15.2 % — HIGH (ref 10.3–14.5)
SODIUM SERPL-SCNC: 143 MMOL/L — SIGNIFICANT CHANGE UP (ref 135–146)
SODIUM UR-SCNC: 112 MMOL/L — SIGNIFICANT CHANGE UP
SP GR SPEC: 1.01 — SIGNIFICANT CHANGE UP (ref 1–1.03)
UROBILINOGEN FLD QL: 0.2 MG/DL — SIGNIFICANT CHANGE UP (ref 0.2–1)
UUN UR-MCNC: 199 MG/DL — SIGNIFICANT CHANGE UP
WBC # BLD: 4.83 K/UL — SIGNIFICANT CHANGE UP (ref 3.8–10.5)
WBC # FLD AUTO: 4.83 K/UL — SIGNIFICANT CHANGE UP (ref 3.8–10.5)

## 2025-07-17 PROCEDURE — 99233 SBSQ HOSP IP/OBS HIGH 50: CPT

## 2025-07-17 PROCEDURE — 93306 TTE W/DOPPLER COMPLETE: CPT | Mod: 26

## 2025-07-17 RX ORDER — DEXTROMETHORPHAN HBR, GUAIFENESIN 20; 200 MG/10ML; MG/10ML
10 SOLUTION ORAL EVERY 8 HOURS
Refills: 0 | Status: DISCONTINUED | OUTPATIENT
Start: 2025-07-17 | End: 2025-07-18

## 2025-07-17 RX ORDER — IPRATROPIUM BROMIDE AND ALBUTEROL SULFATE .5; 2.5 MG/3ML; MG/3ML
3 SOLUTION RESPIRATORY (INHALATION) EVERY 8 HOURS
Refills: 0 | Status: DISCONTINUED | OUTPATIENT
Start: 2025-07-17 | End: 2025-07-18

## 2025-07-17 RX ORDER — DEXTROMETHORPHAN HBR, GUAIFENESIN 20; 200 MG/10ML; MG/10ML
10 SOLUTION ORAL EVERY 6 HOURS
Refills: 0 | Status: DISCONTINUED | OUTPATIENT
Start: 2025-07-17 | End: 2025-07-17

## 2025-07-17 RX ORDER — SODIUM BICARBONATE 1 MEQ/ML
650 SYRINGE (ML) INTRAVENOUS EVERY 8 HOURS
Refills: 0 | Status: DISCONTINUED | OUTPATIENT
Start: 2025-07-17 | End: 2025-07-18

## 2025-07-17 RX ORDER — IPRATROPIUM BROMIDE AND ALBUTEROL SULFATE .5; 2.5 MG/3ML; MG/3ML
3 SOLUTION RESPIRATORY (INHALATION) EVERY 6 HOURS
Refills: 0 | Status: DISCONTINUED | OUTPATIENT
Start: 2025-07-17 | End: 2025-07-17

## 2025-07-17 RX ORDER — BENZONATATE 100 MG
100 CAPSULE ORAL EVERY 8 HOURS
Refills: 0 | Status: DISCONTINUED | OUTPATIENT
Start: 2025-07-17 | End: 2025-07-18

## 2025-07-17 RX ADMIN — HEPARIN SODIUM 5000 UNIT(S): 1000 INJECTION INTRAVENOUS; SUBCUTANEOUS at 17:18

## 2025-07-17 RX ADMIN — DEXTROMETHORPHAN HBR, GUAIFENESIN 10 MILLILITER(S): 20; 200 SOLUTION ORAL at 21:03

## 2025-07-17 RX ADMIN — Medication 650 MILLIGRAM(S): at 16:30

## 2025-07-17 RX ADMIN — Medication 1 APPLICATION(S): at 12:22

## 2025-07-17 RX ADMIN — Medication 650 MILLIGRAM(S): at 21:02

## 2025-07-17 RX ADMIN — TAMSULOSIN HYDROCHLORIDE 0.4 MILLIGRAM(S): 0.4 CAPSULE ORAL at 21:02

## 2025-07-17 RX ADMIN — ATORVASTATIN CALCIUM 80 MILLIGRAM(S): 80 TABLET, FILM COATED ORAL at 21:02

## 2025-07-17 RX ADMIN — BUMETANIDE 1 MILLIGRAM(S): 1 TABLET ORAL at 05:33

## 2025-07-17 RX ADMIN — IPRATROPIUM BROMIDE AND ALBUTEROL SULFATE 3 MILLILITER(S): .5; 2.5 SOLUTION RESPIRATORY (INHALATION) at 03:19

## 2025-07-17 RX ADMIN — Medication 3 MILLIGRAM(S): at 21:03

## 2025-07-17 RX ADMIN — Medication 100 MILLIGRAM(S): at 16:31

## 2025-07-17 RX ADMIN — Medication 81 MILLIGRAM(S): at 12:22

## 2025-07-17 RX ADMIN — BUMETANIDE 1 MILLIGRAM(S): 1 TABLET ORAL at 13:41

## 2025-07-17 RX ADMIN — DEXTROMETHORPHAN HBR, GUAIFENESIN 10 MILLILITER(S): 20; 200 SOLUTION ORAL at 16:30

## 2025-07-17 RX ADMIN — Medication 100 MILLIGRAM(S): at 21:02

## 2025-07-17 RX ADMIN — HEPARIN SODIUM 5000 UNIT(S): 1000 INJECTION INTRAVENOUS; SUBCUTANEOUS at 05:33

## 2025-07-17 NOTE — PHYSICAL THERAPY INITIAL EVALUATION ADULT - LEVEL OF INDEPENDENCE: STAIR NEGOTIATION, REHAB EVAL
unable to perform secondary to fatigue after and increased coughing after ambulating on level with rolling walker at this time

## 2025-07-17 NOTE — PHYSICAL THERAPY INITIAL EVALUATION ADULT - GAIT DEVIATIONS NOTED, PT EVAL
stooped posture, dec heel strike/pushoff/decreased darryn/decreased step length/decreased weight-shifting ability

## 2025-07-17 NOTE — PATIENT PROFILE ADULT - FALL HARM RISK - HARM RISK INTERVENTIONS

## 2025-07-17 NOTE — PROGRESS NOTE ADULT - SUBJECTIVE AND OBJECTIVE BOX
Patient is a 89y old  Male who presents with a chief complaint of Lower Extremity Edema (2025 18:08)      MEDICATIONS  (STANDING):  aspirin enteric coated 81 milliGRAM(s) Oral daily  atorvastatin 80 milliGRAM(s) Oral at bedtime  bumetanide Injectable 1 milliGRAM(s) IV Push two times a day  chlorhexidine 2% Cloths 1 Application(s) Topical daily  heparin   Injectable 5000 Unit(s) SubCutaneous every 12 hours  tamsulosin 0.4 milliGRAM(s) Oral at bedtime    MEDICATIONS  (PRN):  acetaminophen     Tablet .. 650 milliGRAM(s) Oral every 6 hours PRN Temp greater or equal to 38C (100.4F), Mild Pain (1 - 3)  albuterol/ipratropium for Nebulization 3 milliLiter(s) Nebulizer every 6 hours PRN Shortness of Breath and/or Wheezing  aluminum hydroxide/magnesium hydroxide/simethicone Suspension 30 milliLiter(s) Oral every 4 hours PRN Dyspepsia  guaifenesin/dextromethorphan Oral Liquid 10 milliLiter(s) Oral every 6 hours PRN Cough  melatonin 3 milliGRAM(s) Oral at bedtime PRN Insomnia      CAPILLARY BLOOD GLUCOSE  I&O's Summary    2025 07:01  -  2025 07:00  --------------------------------------------------------  IN: 0 mL / OUT: 300 mL / NET: -300 mL        PHYSICAL EXAM:  Vital Signs Last 24 Hrs  T(C): 36.7 (2025 04:31), Max: 36.8 (2025 20:18)  T(F): 98 (2025 04:31), Max: 98.2 (2025 20:18)  HR: 85 (2025 04:31) (64 - 96)  BP: 96/57 (2025 04:31) (96/57 - 143/65)  BP(mean): --  RR: 20 (2025 04:31) (18 - 20)  SpO2: 95% (2025 04:31) (94% - 99%)    Parameters below as of 2025 00:00  Patient On (Oxygen Delivery Method): room air      GENERAL: No acute distress, well-developed  HEAD:  Atraumatic, Normocephalic  EYES: conjunctiva and sclera clear  NECK: Supple,, no JVD  CHEST/LUNG: Cough and congestion   HEART: Regular rate and rhythm; No murmurs,  ABDOMEN: Soft, non-tender, non-distended  EXTREMITIES :B/L LE  pitting edema up to shins   NEUROLOGY: A&O x 2-3, no focal deficits  SKIN: No rashes or lesions    LABS:                        11.1   4.83  )-----------( 174      ( 2025 07:07 )             34.9     07-    143  |  109  |  62[HH]  ----------------------------<  129[H]  3.6   |  17  |  4.2[HH]    Ca    7.7[L]      2025 07:07    TPro  5.6[L]  /  Alb  3.3[L]  /  TBili  0.2  /  DBili  x   /  AST  10  /  ALT  7   /  AlkPhos  97  07-17      Urinalysis Basic - ( 2025 08:00 )    Color: Yellow / Appearance: Clear / S.009 / pH: x  Gluc: x / Ketone: x  / Bili: Negative / Urobili: 0.2 mg/dL   Blood: x / Protein: Trace mg/dL / Nitrite: Negative   Leuk Esterase: Negative / RBC: x / WBC x   Sq Epi: x / Non Sq Epi: x / Bacteria: x

## 2025-07-17 NOTE — PHYSICAL THERAPY INITIAL EVALUATION ADULT - PERTINENT HX OF CURRENT PROBLEM, REHAB EVAL
89 y/o male admitted with diagnosis of Hypervolemia, sent to ED by PCP for worsening LE swelling and worried that labs may be worsening since patient has CKD, seen by Nephrology

## 2025-07-17 NOTE — PROGRESS NOTE ADULT - ASSESSMENT
89 y/o male, pmh of htn, hld, copd, p/w worsening lower extremity swelling, several days, sent in by pcp, Pt has ckd and pcp was worried labs may be worsening.    Bilateral Lower extremity Pitting 2-3+ edema up to B/L shins   Possible new Onset CHF: cough with congestion on exam , no overt crackles   Elevated troponin: likely from CKD, No CP, No ST changes on EKG   Progression of CKD IV  Chronic COPD   Hypertension  HLD       CXR Persistent opacities in both apices, right worse than left. Developing infiltrate right lower lobe.  BNP elevated,  CT Chest: with severe Empyema, no changes form April   F/up 2D-ECHO, urine studies and renal bladder sono  Continue Bumex 1mg Q12, strict I/O's and Monitor on tele  F/up Nephro consult    DVT PPX: HSQ  Full Code   Dispo: from Home   Pending w/up of edema and IV diuresis, nephro eval

## 2025-07-17 NOTE — PHYSICAL THERAPY INITIAL EVALUATION ADULT - GENERAL OBSERVATIONS, REHAB EVAL
08:30-09:00 Chart reviewed. Pt encountered semireclined in bed, may be seen by Physical Therapist as confirmed with Nurse. Patient denied pain and ready to get up now; +tele/ IV lock LUE

## 2025-07-17 NOTE — CONSULT NOTE ADULT - SUBJECTIVE AND OBJECTIVE BOX
NEPHROLOGY CONSULTATION NOTE    JARED ROBLES  89y  Male  MRN-[id number]    CC:   Patient is a 89y old  Male who presents with a chief complaint of Lower Extremity Edema (17 Jul 2025 13:57)      HPI:  89 y/o male, pmh of htn, hld, copd, p/w worsening lower extremity swelling, several days, sent in by pcp, Pt has ckd and pcp was worried labs may be worsening. Denies fever, chills, cp, sob, neck pain, visual changes, nvd, dizziness, numbness, tingling.  Patient speaks Hungarian and  #[id number] attempted to converse but patient prefers english.  Patient unsure of medical history and care and states his brother brought him in.  (16 Jul 2025 18:08)      PAST MEDICAL & SURGICAL HISTORY:  Advanced COPD      H/O: HTN (hypertension)      [place] (hard of hearing)      Chronic kidney disease, unspecified CKD stage      History of intraocular lens implant  LEFT EYE        Allergies:  No Known Allergies    Home Medications Reviewed  Hospital Medications:   MEDICATIONS  (STANDING):  aspirin enteric coated 81 milliGRAM(s) Oral daily  atorvastatin 80 milliGRAM(s) Oral at bedtime  bumetanide Injectable 1 milliGRAM(s) IV Push two times a day  chlorhexidine 2% Cloths 1 Application(s) Topical daily  heparin   Injectable 5000 Unit(s) SubCutaneous every 12 hours  tamsulosin 0.4 milliGRAM(s) Oral at bedtime    MEDICATIONS  (PRN):  acetaminophen     Tablet .. 650 milliGRAM(s) Oral every 6 hours PRN Temp greater or equal to 38C (100.4F), Mild Pain (1 - 3)  albuterol/ipratropium for Nebulization 3 milliLiter(s) Nebulizer every 6 hours PRN Shortness of Breath and/or Wheezing  aluminum hydroxide/magnesium hydroxide/simethicone Suspension 30 milliLiter(s) Oral every 4 hours PRN Dyspepsia  guaifenesin/dextromethorphan Oral Liquid 10 milliLiter(s) Oral every 6 hours PRN Cough  melatonin 3 milliGRAM(s) Oral at bedtime PRN Insomnia    Home Medications:      SOCIAL HISTORY:  Social History:      FAMILY HISTORY:      REVIEW OF SYSTEMS:  All other review of systems is negative unless indicated above.    VITALS:  T(F): 97.7 (07-17-25 @ 14:19), Max: 98.2 (07-16-25 @ 20:18)  HR: 89 (07-17-25 @ 14:19)  BP: 128/65 (07-17-25 @ 14:19)  RR: 18 (07-17-25 @ 14:19)  SpO2: 98% (07-17-25 @ 14:19)    Height (cm): 160 (07-17 @ 03:08)  Weight (kg): 52.4 (07-17 @ 03:08)  BMI (kg/m2): 20.5 (07-17 @ 03:08)  BSA (m2): 1.53 (07-17 @ 03:08)  I&O's Detail    16 Jul 2025 07:01  -  17 Jul 2025 07:00  --------------------------------------------------------  IN:  Total IN: 0 mL    OUT:    Voided (mL): 300 mL  Total OUT: 300 mL    Total NET: -300 mL      17 Jul 2025 07:01  -  17 Jul 2025 14:32  --------------------------------------------------------  IN:  Total IN: 0 mL    OUT:    Voided (mL): 380 mL  Total OUT: 380 mL    Total NET: -380 mL            I&O's Summary    16 Jul 2025 07:01  -  17 Jul 2025 07:00  --------------------------------------------------------  IN: 0 mL / OUT: 300 mL / NET: -300 mL    17 Jul 2025 07:01  -  17 Jul 2025 14:32  --------------------------------------------------------  IN: 0 mL / OUT: 380 mL / NET: -380 mL        PHYSICAL EXAM:  Gen: NAD, coughing  resp: b/l ronchi  abd: soft  ext: b/l pedal edema    LABS:  Daily Height in cm: 160.02 (17 Jul 2025 03:08)        07-17    143  |  109  |  62[HH]  ----------------------------<  129[H]  3.6   |  17  |  4.2[HH]    Ca    7.7[L]      17 Jul 2025 07:07    TPro  5.6[L]  /  Alb  3.3[L]  /  TBili  0.2  /  DBili      /  AST  10  /  ALT  7   /  AlkPhos  97  07-17    eGFR if Non African American: 17 mL/min/1.73M2 (04-04-19 @ 05:33)  eGFR if African American: 20 mL/min/1.73M2 (04-04-19 @ 05:33)  eGFR if Non African American: 17 mL/min/1.73M2 (04-03-19 @ 23:55)  eGFR if : 19 mL/min/1.73M2 (04-03-19 @ 23:55)    Creatinine Trend:   Creatinine: 4.2 mg/dL (07-17-25 @ 07:07)  Creatinine: 3.9 mg/dL (07-16-25 @ 13:06)  Creatinine: 3.2 mg/dL (04-28-25 @ 07:44)  Creatinine: 3.7 mg/dL (04-27-25 @ 07:41)  Creatinine: 4.4 mg/dL (04-26-25 @ 07:58)  Creatinine: 3.8 mg/dL (04-25-25 @ 18:45)  Creatinine: 4.1 mg/dL (04-25-25 @ 17:00)  Creatinine, Serum: 3.2 mg/dL (04-04-19 @ 05:33)  Creatinine, Serum: 3.3 mg/dL (04-03-19 @ 23:55)  Creatinine, Serum: 3.5 mg/dL (04-03-19 @ 19:35)  Creatinine, Serum: 2.7 mg/dL (04-03-19 @ 10:57)  Creatinine, Serum: 2.8 mg/dL (04-03-19 @ 05:25)  Creatinine, Serum: 2.4 mg/dL (04-02-19 @ 05:44)  Creatinine, Serum: 2.5 mg/dL (04-01-19 @ 05:24)  Creatinine, Serum: 2.7 mg/dL (03-31-19 @ 22:20)                            11.1   4.83  )-----------( 174      ( 17 Jul 2025 07:07 )             34.9     Mean Cell Volume: 95.9 fl (07-17-25 @ 07:07)    Urine Studies:  Protein, Urine: Trace mg/dL (07-17-25 @ 08:00)  Protein, Urine: 30 mg/dL (04-25-25 @ 18:50)  White Blood Cell - Urine: 4 /HPF (04-25-25 @ 18:50)  Red Blood Cell - Urine: 0 /HPF (04-25-25 @ 18:50)  Protein, Urine: 30 mg/dL (04-01-19 @ 13:50)  Red Blood Cell - Urine: 1-2 /HPF (04-01-19 @ 13:50)    Sodium, Random Urine: 112.0 mmoL/L (07-17 @ 08:00)  Creatinine, Random Urine: 26 mg/dL (07-17 @ 08:00)            RADIOLOGY & ADDITIONAL STUDIES:    US Kidney and Bladder:   ACC: [id number] EXAM:  US KIDNEYS AND BLADDER   ORDERED BY: JULIO BREWER     PROCEDURE DATE:  07/16/2025          INTERPRETATION:  CLINICAL INFORMATION: Acute kidney injury.    COMPARISON: Kidney ultrasound 4/25/2025. CT abdomen and pelvis 4/25/2025.    TECHNIQUE: Sonography of the kidneys and bladder.    FINDINGS:    Right kidney: Measures 6.3 cm. No hydronephrosis or calculus. 1.8 x 1.5 x   1.3 cm upper pole cortical cyst. Atrophic with increased cortical   echogenicity.  Left kidney: Measures 5.4 cm. No hydronephrosis or calculus. 1.6 x 1.5 x   1.4 cm interpolar cortical cyst. Atrophic with increased cortical   echogenicity.    Urinary bladder: Underdistended limiting evaluation. Prevoid volume 116   mL. Patient unable to void thereforeno postvoid residual urinary bladder   volume obtained. Right ureteral jet is visualized. Nonspecific   nonvisualization of the left ureteral jet. Trabeculated thickened wall.    Prostate: Measures 3.6 x 2.9 x 2.8 cm, volume of 15 mL.    IMPRESSION:    1.  No hydronephrosis.  2.  Bilateral atrophic kidneys with left greater than right increased   cortical echogenicity likely representing underlying medical renal   disease.  3.  Trabeculated thickened urinary bladder wall. Limiting evaluation to   the level of distention. Patient unable to void therefore no postvoid   residual volume obtained.    --- End of Report ---          SAVANNAH METCALF MD; Resident Radiologist  This document has been electronically signed.  JACLYN MADISON MD; Attending Radiologist  This document has been electronically signed. Jul 16 2025 10:34PM (07-16-25 @ 21:27)      CT Abdomen and Pelvis No Cont:   ACC: [id number] EXAM:  CT ABDOMEN AND PELVIS   ORDERED BY: GE SANCHEZ     ACC: [id number] EXAM:  CT CHEST   ORDERED BY: JULIO BREWER     PROCEDURE DATE:  04/25/2025          INTERPRETATION:  CLINICAL INFORMATION: Chest and abdominal pain, elevated   creatinine    COMPARISON: CT ABDOMEN/PELVIS 12/26/2006, CT CHEST 4/27/2022.    CONTRAST/COMPLICATIONS:  IV Contrast: None  Oral Contrast: None    PROCEDURE:  CT of the Chest, Abdomen and Pelvis was performed.  Sagittal and coronal reformats were performed.    FINDINGS:  CHEST:  LUNGS AND LARGE AIRWAYS: Severe upper lobe predominant, centrilobular   emphysematous changes. Scattered peripheral reticulations and tree-in-bud   opacities within the bilateral upper, right middle and lower lobes. Trace   debris within the upper airways, compatible with secretions. Respiratory   motion, limiting sensitivity for small nodules  PLEURA: No pleural effusion.  VESSELS: Aortic calcifications. Coronary artery calcifications. Stable   mild ectasia of the ascending thoracic aorta up to 4.1 cm  HEART: Heart size is normal. No pericardial effusion.  MEDIASTINUM AND CAR: No lymphadenopathy.  CHEST WALL AND LOWER NECK: Within normal limits.    ABDOMEN AND PELVIS:  LIVER: Within normal limits.  BILE DUCTS: Normal caliber.  GALLBLADDER: Within normal limits.  SPLEEN: Within normal limits.  PANCREAS: Within normal limits.  ADRENALS: Within normal limits.  KIDNEYS/URETERS: Bilateral atrophic kidneys. No nephrosis. Bilateral   renal cysts    BLADDER: Within normal limits.  REPRODUCTIVE ORGANS: Unremarkable at CT.    BOWEL: Small hiatal hernia. No bowel obstruction. Colonic diverticulosis.  PERITONEUM/RETROPERITONEUM: Within normal limits.  VESSELS: Within normal limits.  LYMPH NODES: No lymphadenopathy.  ABDOMINAL WALL: Within normal limits.  BONES: Osteopenia. Multilevel retrolisthesis at L3-L4 and L2-L3. Stable   T5 compression deformity.    IMPRESSION:  CHEST:  Bilateral upper and lower lobe scattered reticular and tree-in-bud   opacities. Findings can reflect early pneumonia versus infectious versus   inflammatory etiology.    ABDOMEN/PELVIS:  No CT evidence of hydronephrosis bilaterally.    --- End of Report ---            HOSEA CAN MD; Attending Radiologist  This document has been electronically signed. Apr 25 2025  9:15PM (04-25-25 @ 20:24)                     NEPHROLOGY CONSULTATION NOTE    JARED ROBLES  89y  Male  MRN-[id number]    CC:   Patient is a 89y old  Male who presents with a chief complaint of Lower Extremity Edema (17 Jul 2025 13:57)      HPI:  89 y/o male, pmh of htn, hld, copd, p/w worsening lower extremity swelling, several days, sent in by pcp, Pt has ckd and pcp was worried labs may be worsening. Denies fever, chills, cp, sob, neck pain, visual changes, nvd, dizziness, numbness, tingling.  Patient speaks Ukrainian and  #[id number] attempted to converse but patient prefers english.  Patient unsure of medical history and care and states his brother brought him in.  (16 Jul 2025 18:08)      PAST MEDICAL & SURGICAL HISTORY:  Advanced COPD      H/O: HTN (hypertension)      [place] (hard of hearing)      Chronic kidney disease, unspecified CKD stage      History of intraocular lens implant  LEFT EYE        Allergies:  No Known Allergies    Home Medications Reviewed  Hospital Medications:   MEDICATIONS  (STANDING):  aspirin enteric coated 81 milliGRAM(s) Oral daily  atorvastatin 80 milliGRAM(s) Oral at bedtime  bumetanide Injectable 1 milliGRAM(s) IV Push two times a day  chlorhexidine 2% Cloths 1 Application(s) Topical daily  heparin   Injectable 5000 Unit(s) SubCutaneous every 12 hours  tamsulosin 0.4 milliGRAM(s) Oral at bedtime    MEDICATIONS  (PRN):  acetaminophen     Tablet .. 650 milliGRAM(s) Oral every 6 hours PRN Temp greater or equal to 38C (100.4F), Mild Pain (1 - 3)  albuterol/ipratropium for Nebulization 3 milliLiter(s) Nebulizer every 6 hours PRN Shortness of Breath and/or Wheezing  aluminum hydroxide/magnesium hydroxide/simethicone Suspension 30 milliLiter(s) Oral every 4 hours PRN Dyspepsia  guaifenesin/dextromethorphan Oral Liquid 10 milliLiter(s) Oral every 6 hours PRN Cough  melatonin 3 milliGRAM(s) Oral at bedtime PRN Insomnia    Home Medications:      SOCIAL HISTORY:  Social History:      FAMILY HISTORY:      REVIEW OF SYSTEMS:  ++cough, chronic  All other review of systems is negative unless indicated above.    VITALS:  T(F): 97.7 (07-17-25 @ 14:19), Max: 98.2 (07-16-25 @ 20:18)  HR: 89 (07-17-25 @ 14:19)  BP: 128/65 (07-17-25 @ 14:19)  RR: 18 (07-17-25 @ 14:19)  SpO2: 98% (07-17-25 @ 14:19)    Height (cm): 160 (07-17 @ 03:08)  Weight (kg): 52.4 (07-17 @ 03:08)  BMI (kg/m2): 20.5 (07-17 @ 03:08)  BSA (m2): 1.53 (07-17 @ 03:08)  I&O's Detail    16 Jul 2025 07:01  -  17 Jul 2025 07:00  --------------------------------------------------------  IN:  Total IN: 0 mL    OUT:    Voided (mL): 300 mL  Total OUT: 300 mL    Total NET: -300 mL      17 Jul 2025 07:01  -  17 Jul 2025 14:32  --------------------------------------------------------  IN:  Total IN: 0 mL    OUT:    Voided (mL): 380 mL  Total OUT: 380 mL    Total NET: -380 mL            I&O's Summary    16 Jul 2025 07:01  -  17 Jul 2025 07:00  --------------------------------------------------------  IN: 0 mL / OUT: 300 mL / NET: -300 mL    17 Jul 2025 07:01  -  17 Jul 2025 14:32  --------------------------------------------------------  IN: 0 mL / OUT: 380 mL / NET: -380 mL        PHYSICAL EXAM:  Gen: NAD, coughing, hard of hearing  resp: b/l ronchi  abd: soft  ext: b/l pedal edema      LABS:  Daily Height in cm: 160.02 (17 Jul 2025 03:08)        07-17    143  |  109  |  62[HH]  ----------------------------<  129[H]  3.6   |  17  |  4.2[HH]    Ca    7.7[L]      17 Jul 2025 07:07    TPro  5.6[L]  /  Alb  3.3[L]  /  TBili  0.2  /  DBili      /  AST  10  /  ALT  7   /  AlkPhos  97  07-17    Creatinine Trend:   Creatinine: 4.2 mg/dL (07-17-25 @ 07:07)  Creatinine: 3.9 mg/dL (07-16-25 @ 13:06)  Creatinine: 3.2 mg/dL (04-28-25 @ 07:44)  Creatinine: 3.7 mg/dL (04-27-25 @ 07:41)  Creatinine: 4.4 mg/dL (04-26-25 @ 07:58)                            11.1   4.83  )-----------( 174      ( 17 Jul 2025 07:07 )             34.9     Mean Cell Volume: 95.9 fl (07-17-25 @ 07:07)    Urine Studies:  Protein, Urine: Trace mg/dL (07-17-25 @ 08:00)          RADIOLOGY & ADDITIONAL STUDIES:    US Kidney and Bladder:   ACC: [id number] EXAM:  US KIDNEYS AND BLADDER   ORDERED BY: JULIO BREWER     PROCEDURE DATE:  07/16/2025          INTERPRETATION:  CLINICAL INFORMATION: Acute kidney injury.    COMPARISON: Kidney ultrasound 4/25/2025. CT abdomen and pelvis 4/25/2025.    TECHNIQUE: Sonography of the kidneys and bladder.    FINDINGS:    Right kidney: Measures 6.3 cm. No hydronephrosis or calculus. 1.8 x 1.5 x   1.3 cm upper pole cortical cyst. Atrophic with increased cortical   echogenicity.  Left kidney: Measures 5.4 cm. No hydronephrosis or calculus. 1.6 x 1.5 x   1.4 cm interpolar cortical cyst. Atrophic with increased cortical   echogenicity.    Urinary bladder: Underdistended limiting evaluation. Prevoid volume 116   mL. Patient unable to void thereforeno postvoid residual urinary bladder   volume obtained. Right ureteral jet is visualized. Nonspecific   nonvisualization of the left ureteral jet. Trabeculated thickened wall.    Prostate: Measures 3.6 x 2.9 x 2.8 cm, volume of 15 mL.    IMPRESSION:    1.  No hydronephrosis.  2.  Bilateral atrophic kidneys with left greater than right increased   cortical echogenicity likely representing underlying medical renal   disease.  3.  Trabeculated thickened urinary bladder wall. Limiting evaluation to   the level of distention. Patient unable to void therefore no postvoid   residual volume obtained.    --- End of Report ---          SAVANNAH METCALF MD; Resident Radiologist  This document has been electronically signed.  JACLYN MADISON MD; Attending Radiologist  This document has been electronically signed. Jul 16 2025 10:34PM (07-16-25 @ 21:27)      CT Abdomen and Pelvis No Cont:   ACC: [id number] EXAM:  CT ABDOMEN AND PELVIS   ORDERED BY: GE SANCHEZ     ACC: [id number] EXAM:  CT CHEST   ORDERED BY: JULIO BREWER     PROCEDURE DATE:  04/25/2025          INTERPRETATION:  CLINICAL INFORMATION: Chest and abdominal pain, elevated   creatinine    COMPARISON: CT ABDOMEN/PELVIS 12/26/2006, CT CHEST 4/27/2022.    CONTRAST/COMPLICATIONS:  IV Contrast: None  Oral Contrast: None    PROCEDURE:  CT of the Chest, Abdomen and Pelvis was performed.  Sagittal and coronal reformats were performed.    FINDINGS:  CHEST:  LUNGS AND LARGE AIRWAYS: Severe upper lobe predominant, centrilobular   emphysematous changes. Scattered peripheral reticulations and tree-in-bud   opacities within the bilateral upper, right middle and lower lobes. Trace   debris within the upper airways, compatible with secretions. Respiratory   motion, limiting sensitivity for small nodules  PLEURA: No pleural effusion.  VESSELS: Aortic calcifications. Coronary artery calcifications. Stable   mild ectasia of the ascending thoracic aorta up to 4.1 cm  HEART: Heart size is normal. No pericardial effusion.  MEDIASTINUM AND CAR: No lymphadenopathy.  CHEST WALL AND LOWER NECK: Within normal limits.    ABDOMEN AND PELVIS:  LIVER: Within normal limits.  BILE DUCTS: Normal caliber.  GALLBLADDER: Within normal limits.  SPLEEN: Within normal limits.  PANCREAS: Within normal limits.  ADRENALS: Within normal limits.  KIDNEYS/URETERS: Bilateral atrophic kidneys. No nephrosis. Bilateral   renal cysts    BLADDER: Within normal limits.  REPRODUCTIVE ORGANS: Unremarkable at CT.    BOWEL: Small hiatal hernia. No bowel obstruction. Colonic diverticulosis.  PERITONEUM/RETROPERITONEUM: Within normal limits.  VESSELS: Within normal limits.  LYMPH NODES: No lymphadenopathy.  ABDOMINAL WALL: Within normal limits.  BONES: Osteopenia. Multilevel retrolisthesis at L3-L4 and L2-L3. Stable   T5 compression deformity.    IMPRESSION:  CHEST:  Bilateral upper and lower lobe scattered reticular and tree-in-bud   opacities. Findings can reflect early pneumonia versus infectious versus   inflammatory etiology.    ABDOMEN/PELVIS:  No CT evidence of hydronephrosis bilaterally.    --- End of Report ---            HOSEA CAN MD; Attending Radiologist  This document has been electronically signed. Apr 25 2025  9:15PM (04-25-25 @ 20:24)

## 2025-07-17 NOTE — CONSULT NOTE ADULT - ASSESSMENT
89 y/o M with PMH COPD not on home O2, smoker, HLD, HTN, CKD 4, p/w LE swelling.    DMITRIY on CKD 5  Renal bladder ultrasound - no hydronephrosis      - d/c bumex  - avoid aggressive BP reduction;  cont to hold nifedipine  - daily bladder scan  - sodium bicarb po 650mg q8h  - h/h at goal  no need for RRT 89 y/o M with PMH COPD not on home O2, smoker, HLD, HTN, CKD 4, p/w LE swelling.    DMITRIY on CKD 5  Renal bladder ultrasound reviewed - no hydronephrosis, b/l atrophic kidneys, trabeculated thickened urinary bladder wall    Recommendations  - d/c bumex  - LE elevation  - avoid aggressive BP reduction;  cont to hold nifedipine  - daily bladder scan  - sodium bicarb po 650mg q8h  - do not limit fluid intake  - h/h at goal  - no need for RRT yet  - outpatient  f/u for bladder wall thickening/smoker

## 2025-07-18 ENCOUNTER — TRANSCRIPTION ENCOUNTER (OUTPATIENT)
Age: 89
End: 2025-07-18

## 2025-07-18 LAB
ANION GAP SERPL CALC-SCNC: 16 MMOL/L — HIGH (ref 7–14)
BUN SERPL-MCNC: 58 MG/DL — HIGH (ref 10–20)
CALCIUM SERPL-MCNC: 7.6 MG/DL — LOW (ref 8.4–10.5)
CHLORIDE SERPL-SCNC: 109 MMOL/L — SIGNIFICANT CHANGE UP (ref 98–110)
CO2 SERPL-SCNC: 21 MMOL/L — SIGNIFICANT CHANGE UP (ref 17–32)
CREAT SERPL-MCNC: 3.9 MG/DL — HIGH (ref 0.7–1.5)
EGFR: 14 ML/MIN/1.73M2 — LOW
EGFR: 14 ML/MIN/1.73M2 — LOW
GLUCOSE SERPL-MCNC: 104 MG/DL — HIGH (ref 70–99)
MAGNESIUM SERPL-MCNC: 1.5 MG/DL — LOW (ref 1.8–2.4)
POTASSIUM SERPL-MCNC: 3.6 MMOL/L — SIGNIFICANT CHANGE UP (ref 3.5–5)
POTASSIUM SERPL-SCNC: 3.6 MMOL/L — SIGNIFICANT CHANGE UP (ref 3.5–5)
SODIUM SERPL-SCNC: 146 MMOL/L — SIGNIFICANT CHANGE UP (ref 135–146)

## 2025-07-18 PROCEDURE — 99239 HOSP IP/OBS DSCHRG MGMT >30: CPT

## 2025-07-18 RX ORDER — SODIUM BICARBONATE 1 MEQ/ML
1 SYRINGE (ML) INTRAVENOUS
Qty: 180 | Refills: 0
Start: 2025-07-18 | End: 2025-09-15

## 2025-07-18 RX ORDER — MAGNESIUM OXIDE 400 MG
1 TABLET ORAL
Qty: 90 | Refills: 0
Start: 2025-07-18 | End: 2025-08-16

## 2025-07-18 RX ORDER — MAGNESIUM SULFATE 500 MG/ML
2 SYRINGE (ML) INJECTION EVERY 6 HOURS
Refills: 0 | Status: DISCONTINUED | OUTPATIENT
Start: 2025-07-18 | End: 2025-07-18

## 2025-07-18 RX ADMIN — Medication 100 MILLIGRAM(S): at 13:13

## 2025-07-18 RX ADMIN — HEPARIN SODIUM 5000 UNIT(S): 1000 INJECTION INTRAVENOUS; SUBCUTANEOUS at 05:23

## 2025-07-18 RX ADMIN — Medication 1 APPLICATION(S): at 11:14

## 2025-07-18 RX ADMIN — Medication 100 MILLIGRAM(S): at 05:23

## 2025-07-18 RX ADMIN — Medication 81 MILLIGRAM(S): at 11:15

## 2025-07-18 RX ADMIN — IPRATROPIUM BROMIDE AND ALBUTEROL SULFATE 3 MILLILITER(S): .5; 2.5 SOLUTION RESPIRATORY (INHALATION) at 16:13

## 2025-07-18 RX ADMIN — DEXTROMETHORPHAN HBR, GUAIFENESIN 10 MILLILITER(S): 20; 200 SOLUTION ORAL at 05:23

## 2025-07-18 RX ADMIN — DEXTROMETHORPHAN HBR, GUAIFENESIN 10 MILLILITER(S): 20; 200 SOLUTION ORAL at 13:13

## 2025-07-18 RX ADMIN — Medication 650 MILLIGRAM(S): at 13:13

## 2025-07-18 RX ADMIN — IPRATROPIUM BROMIDE AND ALBUTEROL SULFATE 3 MILLILITER(S): .5; 2.5 SOLUTION RESPIRATORY (INHALATION) at 00:52

## 2025-07-18 RX ADMIN — Medication 12.5 GRAM(S): at 11:15

## 2025-07-18 RX ADMIN — IPRATROPIUM BROMIDE AND ALBUTEROL SULFATE 3 MILLILITER(S): .5; 2.5 SOLUTION RESPIRATORY (INHALATION) at 07:20

## 2025-07-18 RX ADMIN — Medication 650 MILLIGRAM(S): at 05:23

## 2025-07-18 NOTE — DISCHARGE NOTE PROVIDER - CARE PROVIDER_API CALL
Suleiman Dos Santos)  Internal Medicine  [address] [place] [address]  Phone: [phone]  Fax: [phone]  Follow Up Time:     Regi Rankin)  Nephrology  [address] [place] [address], [facility] [address]  [place] [address]  Phone: [phone]  Fax: [phone]  Follow Up Time:     Harjeet Lockett)  Urology  [address] [place] [address]  Phone: [phone]  Fax: [phone]  Follow Up Time:

## 2025-07-18 NOTE — DISCHARGE NOTE NURSING/CASE MANAGEMENT/SOCIAL WORK - PATIENT PORTAL LINK FT
You can access the FollowMyHealth Patient Portal offered by [facility] by registering at the following website: [email or web address]. By joining [facility]’s FollowMyHealth portal, you will also be able to view your health information using other applications (apps) compatible with our system.

## 2025-07-18 NOTE — DISCHARGE NOTE PROVIDER - PROVIDER TOKENS
PROVIDER:[TOKEN:[[id number]]],PROVIDER:[TOKEN:[[id number]]],PROVIDER:[TOKEN:[[id number]]]

## 2025-07-18 NOTE — DISCHARGE NOTE PROVIDER - NSDCMRMEDTOKEN_GEN_ALL_CORE_FT
ascorbic acid 500 mg oral tablet: 1 tab(s) orally every 8 hours  Aspir 81 oral delayed release tablet: 1 tab(s) orally once a day  atorvastatin 80 mg oral tablet: 1 tab(s) orally once a day (at bedtime)  benzonatate 100 mg oral capsule: 1 cap(s) orally every 8 hours  budesonide-formoterol 160 mcg-4.5 mcg/inh inhalation aerosol: 2 puff(s) inhaled 2 times a day   ProAir HFA 90 mcg/inh inhalation aerosol: 2 puff(s) inhaled 4 times a day, As Needed  sodium bicarbonate 650 mg oral tablet: 1 tab(s) orally every 8 hours  tamsulosin 0.4 mg oral capsule: 1 cap(s) orally once a day (at bedtime)  tiotropium 18 mcg inhalation capsule: 1 cap(s) inhaled once a day  zinc sulfate 220 mg (as elemental zinc 50 mg) oral capsule: 1 cap(s) orally once a day

## 2025-07-18 NOTE — DISCHARGE NOTE PROVIDER - CARE PROVIDERS DIRECT ADDRESSES
,[email or web address]

## 2025-07-18 NOTE — PROGRESS NOTE ADULT - SUBJECTIVE AND OBJECTIVE BOX
Nephrology Progress Note    JARED ROBLES  MRN-[id number]  89y  Male    S:  Patient is seen and examined, events over the last 24h noted.    O:  Allergies:  No Known Allergies    Hospital Medications:   MEDICATIONS  (STANDING):  albuterol/ipratropium for Nebulization 3 milliLiter(s) Nebulizer every 8 hours  aspirin enteric coated 81 milliGRAM(s) Oral daily  atorvastatin 80 milliGRAM(s) Oral at bedtime  benzonatate 100 milliGRAM(s) Oral every 8 hours  chlorhexidine 2% Cloths 1 Application(s) Topical daily  guaifenesin/dextromethorphan Oral Liquid 10 milliLiter(s) Oral every 8 hours  heparin   Injectable 5000 Unit(s) SubCutaneous every 12 hours  magnesium sulfate  IVPB 2 Gram(s) IV Intermittent every 6 hours  sodium bicarbonate 650 milliGRAM(s) Oral every 8 hours  tamsulosin 0.4 milliGRAM(s) Oral at bedtime    MEDICATIONS  (PRN):  acetaminophen     Tablet .. 650 milliGRAM(s) Oral every 6 hours PRN Temp greater or equal to 38C (100.4F), Mild Pain (1 - 3)  melatonin 3 milliGRAM(s) Oral at bedtime PRN Insomnia    Home Medications:      VITALS:  T(F): 97.8 (07-18-25 @ 04:35), Max: 97.8 (07-18-25 @ 04:35)  HR: 83 (07-18-25 @ 04:35)  BP: 138/77 (07-18-25 @ 04:35)  RR: 18 (07-18-25 @ 04:35)  SpO2: 94% (07-18-25 @ 04:35)  Wt(kg): --  I&O's Detail    17 Jul 2025 07:01  -  18 Jul 2025 07:00  --------------------------------------------------------  IN:  Total IN: 0 mL    OUT:    Voided (mL): 780 mL  Total OUT: 780 mL    Total NET: -780 mL        I&O's Summary    17 Jul 2025 07:01  -  18 Jul 2025 07:00  --------------------------------------------------------  IN: 0 mL / OUT: 780 mL / NET: -780 mL          PHYSICAL EXAM:  Gen: NAD  Chest: rosi  Abd: soft  Extremities: pedal edema      LABS:      07-18    146  |  109  |  58[H]  ----------------------------<  104[H]  3.6   |  21  |  3.9[H]    Ca    7.6[L]      18 Jul 2025 06:15  Mg     1.5     07-18    TPro  5.6[L]  /  Alb  3.3[L]  /  TBili  0.2  /  DBili      /  AST  10  /  ALT  7   /  AlkPhos  97  07-17                         11.1   4.83  )-----------( 174      ( 17 Jul 2025 07:07 )             34.9     Mean Cell Volume: 95.9 fl (07-17-25 @ 07:07)          Urine Studies:  Protein, Urine: Trace mg/dL (07-17-25 @ 08:00)  Urea Nitrogen,  Random Urine: 199 mg/dL (07-17-25 @ 08:00)    Sodium, Random Urine: 112.0 mmoL/L (07-17 @ 08:00)  Creatinine, Random Urine: 26 mg/dL (07-17 @ 08:00)    Creatinine trend:  Creatinine: 3.9 mg/dL (07-18-25 @ 06:15)  Creatinine: 4.2 mg/dL (07-17-25 @ 07:07)  Creatinine: 3.9 mg/dL (07-16-25 @ 13:06)  Creatinine: 3.2 mg/dL (04-28-25 @ 07:44)  Creatinine: 3.7 mg/dL (04-27-25 @ 07:41)  Creatinine: 4.4 mg/dL (04-26-25 @ 07:58)

## 2025-07-18 NOTE — DISCHARGE NOTE PROVIDER - NSDCCPCAREPLAN_GEN_ALL_CORE_FT
PRINCIPAL DISCHARGE DIAGNOSIS  Diagnosis: Chronic kidney disease (CKD)  Assessment and Plan of Treatment: You presented with worsened  Bilateral lower extremity swelling   This likjley from progressionof CKD   Chest xray: showed  Persistent opacities in both apices, right worse than left. Developing infiltrate right lower lobe.   CT Chest: with severe Empyema, no changes form April      2D-ECHO:  1. Left ventricular systolic function is normal with an ejection fraction visually estimated at 55 to 60 %. Mild to moderate tricuspid regurgitation.  urine studies were normal   from: US Kidney and Bladder  1.  No hydronephrosis.  2.  Bilateral atrophic kidneys with left greater than right increased   cortical echogenicity likely representing underlying medical renal   disease.  3.  Trabeculated thickened urinary bladder wall. Limiting evaluation to   the level of distention. Patient unable to void therefore no postvoid   residual volume obtained.  Nephro consulted: Team agrees with progression of Chronic Kidney disease You were started on Oral Bicarb to help slow the progression of Chronic kidney disease   Follow-up with the nephrologist and PCP in 1 week         SECONDARY DISCHARGE DIAGNOSES  Diagnosis: Bladder trabeculation  Assessment and Plan of Treatment: from: US Kidney and Bladder  1.  No hydronephrosis.  2.  Bilateral atrophic kidneys with left greater than right increased   cortical echogenicity likely representing underlying medical renal   disease.  3.  Trabeculated thickened urinary bladder wall. Limiting evaluation to   the level of distention. Patient unable to void therefore no postvoid   residual volume obtained.  Follow-up with the urologist for further evaluation

## 2025-07-18 NOTE — DISCHARGE NOTE PROVIDER - HOSPITAL COURSE
89 y/o male, pmh of htn, hld, copd, p/w worsening lower extremity swelling, several days, sent in by pcp, Pt has ckd and pcp was worried labs may be worsening.    Bilateral Lower extremity Pitting 2-3+ edema up to B/L shins   Possible new Onset CHF: cough with congestion on exam , no overt crackles   Elevated troponin: likely from CKD, No CP, No ST changes on EKG   Progression of CKD IV  Chronic COPD   Hypertension  HLD       CXR Persistent opacities in both apices, right worse than left. Developing infiltrate right lower lobe.  BNP elevated,  CT Chest: with severe Empyema, no changes form April      2D-ECHO:  1. Left ventricular systolic function is normal with an ejection   fraction visually estimated at 55 to 60 %. Mild to moderate tricuspid regurgitation.  .urine studies were normal     from: US Kidney and Bladder    1.  No hydronephrosis.  2.  Bilateral atrophic kidneys with left greater than right increased   cortical echogenicity likely representing underlying medical renal   disease.  3.  Trabeculated thickened urinary bladder wall. Limiting evaluation to   the level of distention. Patient unable to void therefore no postvoid   residual volume obtained.  Nephro consult appreciated> progression of Chronic Kidney disease   You were started on Oral Bicarb to help slow the progression of Chronic kidney disease     Follow-up with the urologist for thickened Urinary bladder

## 2025-07-18 NOTE — DISCHARGE NOTE PROVIDER - ATTENDING DISCHARGE PHYSICAL EXAMINATION:
GENERAL: No acute distress, well-developed  HEAD:  Atraumatic, Normocephalic  EYES: conjunctiva and sclera clear  NECK: Supple,, no JVD  CHEST/LUNG: Cough and congestion   HEART: Regular rate and rhythm; No murmurs,  ABDOMEN: Soft, non-tender, non-distended  EXTREMITIES :B/L LE  pitting edema up to shins   NEUROLOGY: A&O x 2-3, no focal deficits  SKIN: No rashes or lesions

## 2025-07-18 NOTE — DISCHARGE NOTE NURSING/CASE MANAGEMENT/SOCIAL WORK - FINANCIAL ASSISTANCE
[facility] provides services at a reduced cost to those who are determined to be eligible through [facility]’s financial assistance program. Information regarding [facility]’s financial assistance program can be found by going to [email or web address] or by calling [phone].

## 2025-07-18 NOTE — PROGRESS NOTE ADULT - ASSESSMENT
89 y/o M with PMH COPD not on home O2, smoker, HLD, HTN, CKD 4, p/w LE swelling.    DMITRIY on CKD 5  Renal bladder ultrasound reviewed - no hydronephrosis, b/l atrophic kidneys, trabeculated thickened urinary bladder wall    Recommendations  - cont to hold bumex  - LE elevation  - cont to hold nifedipine  - daily bladder scan  - cont sodium bicarb po 650mg q8h  - calcium corrected mild low, start calcitriol 0.25mg TIW  - replete Mg  - encourage fluid intake  - h/h at goal  - no need for RRT   - outpatient  f/u for bladder wall thickening/smoker

## 2025-07-19 VITALS
RESPIRATION RATE: 18 BRPM | DIASTOLIC BLOOD PRESSURE: 112 MMHG | HEART RATE: 104 BPM | SYSTOLIC BLOOD PRESSURE: 151 MMHG | TEMPERATURE: 98 F | OXYGEN SATURATION: 96 %

## 2025-07-29 DIAGNOSIS — N17.9 ACUTE KIDNEY FAILURE, UNSPECIFIED: ICD-10-CM

## 2025-07-29 DIAGNOSIS — N18.5 CHRONIC KIDNEY DISEASE, STAGE 5: ICD-10-CM

## 2025-07-29 DIAGNOSIS — J86.9 PYOTHORAX WITHOUT FISTULA: ICD-10-CM

## 2025-07-29 DIAGNOSIS — J44.9 CHRONIC OBSTRUCTIVE PULMONARY DISEASE, UNSPECIFIED: ICD-10-CM

## 2025-07-29 DIAGNOSIS — R60.0 LOCALIZED EDEMA: ICD-10-CM

## 2025-07-29 DIAGNOSIS — E87.70 FLUID OVERLOAD, UNSPECIFIED: ICD-10-CM

## 2025-07-29 DIAGNOSIS — Z79.82 LONG TERM (CURRENT) USE OF ASPIRIN: ICD-10-CM

## 2025-07-29 DIAGNOSIS — N32.89 OTHER SPECIFIED DISORDERS OF BLADDER: ICD-10-CM

## 2025-07-29 DIAGNOSIS — F17.200 NICOTINE DEPENDENCE, UNSPECIFIED, UNCOMPLICATED: ICD-10-CM

## 2025-07-29 DIAGNOSIS — I13.2 HYPERTENSIVE HEART AND CHRONIC KIDNEY DISEASE WITH HEART FAILURE AND WITH STAGE 5 CHRONIC KIDNEY DISEASE, OR END STAGE RENAL DISEASE: ICD-10-CM

## 2025-07-29 DIAGNOSIS — E78.5 HYPERLIPIDEMIA, UNSPECIFIED: ICD-10-CM

## 2025-07-29 DIAGNOSIS — R79.89 OTHER SPECIFIED ABNORMAL FINDINGS OF BLOOD CHEMISTRY: ICD-10-CM

## 2025-08-05 PROBLEM — N18.9 CHRONIC KIDNEY DISEASE, UNSPECIFIED: Chronic | Status: ACTIVE | Noted: 2025-07-16

## 2025-09-09 ENCOUNTER — NON-APPOINTMENT (OUTPATIENT)
Age: 89
End: 2025-09-09

## 2025-09-09 ENCOUNTER — APPOINTMENT (OUTPATIENT)
Dept: UROLOGY | Facility: CLINIC | Age: 89
End: 2025-09-09
Payer: MEDICARE

## 2025-09-09 VITALS
HEART RATE: 75 BPM | RESPIRATION RATE: 17 BRPM | DIASTOLIC BLOOD PRESSURE: 85 MMHG | BODY MASS INDEX: 20.73 KG/M2 | HEIGHT: 63 IN | SYSTOLIC BLOOD PRESSURE: 165 MMHG | WEIGHT: 117 LBS | OXYGEN SATURATION: 97 %

## 2025-09-09 DIAGNOSIS — N28.1 CYST OF KIDNEY, ACQUIRED: ICD-10-CM

## 2025-09-09 DIAGNOSIS — Z82.5 FAMILY HISTORY OF ASTHMA AND OTHER CHRONIC LOWER RESPIRATORY DISEASES: ICD-10-CM

## 2025-09-09 DIAGNOSIS — Z87.891 PERSONAL HISTORY OF NICOTINE DEPENDENCE: ICD-10-CM

## 2025-09-09 DIAGNOSIS — R93.49 ABNORMAL RADIOLOGIC FINDINGS ON DIAGNOSITIC IMAGING OF OTHER URINARY ORGANS: ICD-10-CM

## 2025-09-09 LAB
BILIRUB UR QL STRIP: NORMAL
COLLECTION METHOD: NORMAL
GLUCOSE UR-MCNC: NORMAL
HCG UR QL: 0.2 EU/DL
HGB UR QL STRIP.AUTO: NORMAL
KETONES UR-MCNC: NORMAL
LEUKOCYTE ESTERASE UR QL STRIP: NORMAL
NITRITE UR QL STRIP: NORMAL
PH UR STRIP: 5.5
PROT UR STRIP-MCNC: 100
SP GR UR STRIP: 1.02

## 2025-09-09 PROCEDURE — 81003 URINALYSIS AUTO W/O SCOPE: CPT | Mod: QW

## 2025-09-09 PROCEDURE — 99204 OFFICE O/P NEW MOD 45 MIN: CPT | Mod: 25

## 2025-09-09 RX ORDER — ASPIRIN 81 MG/1
81 TABLET, CHEWABLE ORAL
Refills: 0 | Status: ACTIVE | COMMUNITY